# Patient Record
Sex: MALE | Race: WHITE | Employment: OTHER | URBAN - METROPOLITAN AREA
[De-identification: names, ages, dates, MRNs, and addresses within clinical notes are randomized per-mention and may not be internally consistent; named-entity substitution may affect disease eponyms.]

---

## 2017-01-30 ENCOUNTER — TELEPHONE (OUTPATIENT)
Dept: PULMONOLOGY | Age: 72
End: 2017-01-30

## 2017-01-31 ENCOUNTER — TELEPHONE (OUTPATIENT)
Dept: PULMONOLOGY | Age: 72
End: 2017-01-31

## 2017-01-31 RX ORDER — CLARITHROMYCIN 500 MG/1
500 TABLET, COATED ORAL 2 TIMES DAILY
Qty: 60 TABLET | Refills: 10 | Status: SHIPPED | OUTPATIENT
Start: 2017-01-31 | End: 2017-08-18 | Stop reason: SDUPTHER

## 2017-01-31 RX ORDER — ETHAMBUTOL HYDROCHLORIDE 400 MG/1
1200 TABLET, FILM COATED ORAL DAILY
Qty: 90 TABLET | Refills: 10 | Status: SHIPPED | OUTPATIENT
Start: 2017-01-31 | End: 2017-09-12 | Stop reason: SDUPTHER

## 2017-02-07 ENCOUNTER — TELEPHONE (OUTPATIENT)
Dept: PULMONOLOGY | Age: 72
End: 2017-02-07

## 2017-02-28 ENCOUNTER — OFFICE VISIT (OUTPATIENT)
Dept: INFECTIOUS DISEASES | Age: 72
End: 2017-02-28

## 2017-02-28 VITALS
HEART RATE: 78 BPM | BODY MASS INDEX: 22.92 KG/M2 | SYSTOLIC BLOOD PRESSURE: 120 MMHG | OXYGEN SATURATION: 96 % | TEMPERATURE: 49.3 F | WEIGHT: 169 LBS | DIASTOLIC BLOOD PRESSURE: 66 MMHG

## 2017-02-28 DIAGNOSIS — A31.9 ATYPICAL MYCOBACTERIAL DISEASE: Primary | ICD-10-CM

## 2017-02-28 PROCEDURE — 99244 OFF/OP CNSLTJ NEW/EST MOD 40: CPT | Performed by: INTERNAL MEDICINE

## 2017-02-28 RX ORDER — PRAVASTATIN SODIUM 40 MG
1 TABLET ORAL DAILY
Refills: 1 | COMMUNITY
Start: 2017-02-22

## 2017-03-08 ENCOUNTER — TELEPHONE (OUTPATIENT)
Dept: PULMONOLOGY | Age: 72
End: 2017-03-08

## 2017-03-08 ENCOUNTER — OFFICE VISIT (OUTPATIENT)
Dept: PULMONOLOGY | Age: 72
End: 2017-03-08

## 2017-03-08 VITALS
SYSTOLIC BLOOD PRESSURE: 126 MMHG | HEART RATE: 76 BPM | DIASTOLIC BLOOD PRESSURE: 76 MMHG | RESPIRATION RATE: 20 BRPM | OXYGEN SATURATION: 97 % | BODY MASS INDEX: 22.89 KG/M2 | WEIGHT: 169 LBS | TEMPERATURE: 97.4 F | HEIGHT: 72 IN

## 2017-03-08 DIAGNOSIS — R91.8 PULMONARY NODULES: ICD-10-CM

## 2017-03-08 DIAGNOSIS — A31.8: ICD-10-CM

## 2017-03-08 DIAGNOSIS — J98.4 CAVITARY LESION OF LUNG: Primary | ICD-10-CM

## 2017-03-08 PROCEDURE — 99214 OFFICE O/P EST MOD 30 MIN: CPT | Performed by: INTERNAL MEDICINE

## 2017-03-15 ENCOUNTER — HOSPITAL ENCOUNTER (OUTPATIENT)
Dept: CT IMAGING | Age: 72
Discharge: HOME | End: 2017-03-15
Attending: INTERNAL MEDICINE | Admitting: INTERNAL MEDICINE

## 2017-03-15 DIAGNOSIS — A31.8: ICD-10-CM

## 2017-03-15 DIAGNOSIS — J98.4 CAVITARY LESION OF LUNG: ICD-10-CM

## 2017-03-15 DIAGNOSIS — R91.8 PULMONARY NODULES: ICD-10-CM

## 2017-03-15 DIAGNOSIS — J98.4 OTHER DISORDERS OF LUNG: ICD-10-CM

## 2017-03-16 ENCOUNTER — TELEPHONE (OUTPATIENT)
Dept: PULMONOLOGY | Age: 72
End: 2017-03-16

## 2017-03-16 ENCOUNTER — HOSPITAL ENCOUNTER (OUTPATIENT)
Dept: OTHER | Age: 72
Discharge: HOME | End: 2017-03-16
Attending: INTERNAL MEDICINE | Admitting: INTERNAL MEDICINE

## 2017-03-16 DIAGNOSIS — I25.84 CORONARY ARTERY CALCIFICATION: ICD-10-CM

## 2017-03-16 DIAGNOSIS — J98.4 CAVITARY LESION OF LUNG: ICD-10-CM

## 2017-03-16 DIAGNOSIS — J98.4 CAVITARY LUNG DISEASE: Primary | ICD-10-CM

## 2017-03-16 DIAGNOSIS — A31.8: ICD-10-CM

## 2017-03-16 DIAGNOSIS — I25.10 CORONARY ARTERY CALCIFICATION: ICD-10-CM

## 2017-03-16 DIAGNOSIS — R91.8 PULMONARY NODULES: ICD-10-CM

## 2017-03-16 DIAGNOSIS — C34.11 PRIMARY MALIGNANT NEOPLASM OF RIGHT UPPER LOBE OF LUNG (HCC): ICD-10-CM

## 2017-03-18 LAB
HISTOPLASMA ANTIGEN, SERUM: <2 U/ML
HISTOPLASMA INTERPETATION: NEGATIVE

## 2017-03-20 ENCOUNTER — TELEPHONE (OUTPATIENT)
Dept: INFECTIOUS DISEASES | Age: 72
End: 2017-03-20

## 2017-03-20 LAB
Lab: NORMAL
REPORT: NORMAL
THIS TEST SENT TO: NORMAL

## 2017-04-11 ENCOUNTER — TELEPHONE (OUTPATIENT)
Dept: PULMONOLOGY | Age: 72
End: 2017-04-11

## 2017-04-11 ENCOUNTER — OFFICE VISIT (OUTPATIENT)
Dept: PULMONOLOGY | Age: 72
End: 2017-04-11

## 2017-04-11 VITALS
RESPIRATION RATE: 20 BRPM | TEMPERATURE: 97 F | HEART RATE: 70 BPM | DIASTOLIC BLOOD PRESSURE: 73 MMHG | OXYGEN SATURATION: 96 % | SYSTOLIC BLOOD PRESSURE: 133 MMHG | HEIGHT: 72 IN | BODY MASS INDEX: 22.97 KG/M2 | WEIGHT: 169.6 LBS

## 2017-04-11 DIAGNOSIS — R91.1 PULMONARY NODULE: ICD-10-CM

## 2017-04-11 DIAGNOSIS — R05.3 CHRONIC COUGH: ICD-10-CM

## 2017-04-11 DIAGNOSIS — J98.4 CAVITARY LESION OF LUNG: Primary | ICD-10-CM

## 2017-04-11 DIAGNOSIS — A31.8: ICD-10-CM

## 2017-04-11 PROCEDURE — 99214 OFFICE O/P EST MOD 30 MIN: CPT | Performed by: INTERNAL MEDICINE

## 2017-04-12 ENCOUNTER — OFFICE VISIT (OUTPATIENT)
Dept: CARDIOLOGY CLINIC | Age: 72
End: 2017-04-12

## 2017-04-12 VITALS
HEIGHT: 72 IN | SYSTOLIC BLOOD PRESSURE: 132 MMHG | WEIGHT: 169 LBS | HEART RATE: 84 BPM | BODY MASS INDEX: 22.89 KG/M2 | OXYGEN SATURATION: 94 % | DIASTOLIC BLOOD PRESSURE: 66 MMHG

## 2017-04-12 DIAGNOSIS — I25.10 CORONARY ARTERY CALCIFICATION SEEN ON CT SCAN: Primary | ICD-10-CM

## 2017-04-12 DIAGNOSIS — R06.02 SOB (SHORTNESS OF BREATH): ICD-10-CM

## 2017-04-12 PROCEDURE — 93000 ELECTROCARDIOGRAM COMPLETE: CPT | Performed by: INTERNAL MEDICINE

## 2017-04-12 PROCEDURE — 99204 OFFICE O/P NEW MOD 45 MIN: CPT | Performed by: INTERNAL MEDICINE

## 2017-04-17 ENCOUNTER — HOSPITAL ENCOUNTER (OUTPATIENT)
Dept: OTHER | Age: 72
Discharge: HOME | End: 2017-04-30
Attending: INTERNAL MEDICINE | Admitting: INTERNAL MEDICINE

## 2017-04-17 DIAGNOSIS — A31.9 ACID FAST BACILLUS: ICD-10-CM

## 2017-05-01 ENCOUNTER — HOSPITAL ENCOUNTER (OUTPATIENT)
Dept: OTHER | Age: 72
Discharge: HOME | End: 2017-05-31
Attending: INTERNAL MEDICINE | Admitting: INTERNAL MEDICINE

## 2017-05-02 LAB
AFB CULTURE (MYCOBACTERIA): NORMAL
AFB SMEAR: NORMAL

## 2017-06-06 LAB
AFB CULTURE (MYCOBACTERIA): NORMAL
AFB SMEAR: NORMAL

## 2017-06-15 ENCOUNTER — TELEPHONE (OUTPATIENT)
Dept: PULMONOLOGY | Age: 72
End: 2017-06-15

## 2017-06-27 LAB
AFB CULTURE (MYCOBACTERIA): NORMAL
AFB SMEAR: NORMAL

## 2017-08-15 ENCOUNTER — HOSPITAL ENCOUNTER (OUTPATIENT)
Dept: OTHER | Age: 72
Discharge: HOME | End: 2017-08-15
Attending: INTERNAL MEDICINE | Admitting: INTERNAL MEDICINE

## 2017-08-15 DIAGNOSIS — J98.4 CAVITARY LUNG DISEASE: ICD-10-CM

## 2017-08-15 DIAGNOSIS — A31.9 ACID FAST BACILLUS: ICD-10-CM

## 2017-08-18 RX ORDER — CLARITHROMYCIN 500 MG/1
TABLET, COATED ORAL
Qty: 60 TABLET | Refills: 5 | Status: SHIPPED | OUTPATIENT
Start: 2017-08-18 | End: 2017-09-12 | Stop reason: SDUPTHER

## 2017-09-06 ENCOUNTER — TELEPHONE (OUTPATIENT)
Dept: PULMONOLOGY | Age: 72
End: 2017-09-06

## 2017-09-06 DIAGNOSIS — R91.8 PULMONARY NODULES: ICD-10-CM

## 2017-09-06 DIAGNOSIS — J98.4 CAVITARY LESION OF LUNG: Primary | ICD-10-CM

## 2017-09-06 DIAGNOSIS — A31.8: ICD-10-CM

## 2017-09-12 ENCOUNTER — OFFICE VISIT (OUTPATIENT)
Dept: PULMONOLOGY | Age: 72
End: 2017-09-12

## 2017-09-12 ENCOUNTER — TELEPHONE (OUTPATIENT)
Dept: PULMONOLOGY | Age: 72
End: 2017-09-12

## 2017-09-12 ENCOUNTER — HOSPITAL ENCOUNTER (OUTPATIENT)
Dept: CT IMAGING | Age: 72
Discharge: HOME | End: 2017-09-12
Attending: INTERNAL MEDICINE | Admitting: INTERNAL MEDICINE

## 2017-09-12 VITALS
SYSTOLIC BLOOD PRESSURE: 124 MMHG | RESPIRATION RATE: 16 BRPM | WEIGHT: 171 LBS | OXYGEN SATURATION: 98 % | TEMPERATURE: 97.1 F | HEART RATE: 65 BPM | BODY MASS INDEX: 23.16 KG/M2 | HEIGHT: 72 IN | DIASTOLIC BLOOD PRESSURE: 66 MMHG

## 2017-09-12 DIAGNOSIS — J98.4 CAVITARY LESION OF LUNG: ICD-10-CM

## 2017-09-12 DIAGNOSIS — A31.8: ICD-10-CM

## 2017-09-12 DIAGNOSIS — J98.4 OTHER DISORDERS OF LUNG: ICD-10-CM

## 2017-09-12 DIAGNOSIS — J98.4 CAVITARY LESION OF LUNG: Primary | ICD-10-CM

## 2017-09-12 DIAGNOSIS — R91.8 PULMONARY NODULES: ICD-10-CM

## 2017-09-12 DIAGNOSIS — R05.3 CHRONIC COUGH: ICD-10-CM

## 2017-09-12 PROCEDURE — 99214 OFFICE O/P EST MOD 30 MIN: CPT | Performed by: INTERNAL MEDICINE

## 2017-09-12 RX ORDER — ETHAMBUTOL HYDROCHLORIDE 400 MG/1
1200 TABLET, FILM COATED ORAL DAILY
Qty: 90 TABLET | Refills: 4 | Status: SHIPPED | OUTPATIENT
Start: 2017-09-12 | End: 2018-02-20 | Stop reason: SDUPTHER

## 2017-09-12 RX ORDER — CLARITHROMYCIN 500 MG/1
TABLET, COATED ORAL
Qty: 60 TABLET | Refills: 4 | Status: SHIPPED | OUTPATIENT
Start: 2017-09-12 | End: 2018-02-19 | Stop reason: SDUPTHER

## 2017-09-15 ENCOUNTER — HOSPITAL ENCOUNTER (OUTPATIENT)
Dept: OTHER | Age: 72
Discharge: HOME | End: 2017-09-30
Attending: INTERNAL MEDICINE | Admitting: INTERNAL MEDICINE

## 2017-09-15 DIAGNOSIS — A31.9 ACID FAST BACILLUS: ICD-10-CM

## 2017-10-03 LAB
AFB CULTURE (MYCOBACTERIA): NORMAL
AFB SMEAR: NORMAL

## 2017-10-31 LAB
AFB CULTURE (MYCOBACTERIA): NORMAL
AFB SMEAR: NORMAL

## 2017-11-01 ENCOUNTER — HOSPITAL ENCOUNTER (OUTPATIENT)
Dept: OTHER | Age: 72
Discharge: HOME | End: 2017-11-30
Attending: INTERNAL MEDICINE | Admitting: INTERNAL MEDICINE

## 2017-11-06 ENCOUNTER — TELEPHONE (OUTPATIENT)
Dept: PULMONOLOGY | Age: 72
End: 2017-11-06

## 2017-11-06 DIAGNOSIS — A31.9 MYCOBACTERIAL DISEASE: Primary | ICD-10-CM

## 2017-11-06 NOTE — TELEPHONE ENCOUNTER
Needs order placed for AFB to take with him to [place]. Order pended. Call when complete. 9/12/17 JOSSELIN Holman  Assessment:       · RUL cavitary lesion with adjacent pulmonary nodules. 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 Ethambutol/Clarthromycin and Rifampin started 11/25/2016 with clinical and radiographic response   · New 1.4 cm RUL nodule on CT chest 3/15/2017. Resolved on repeat CT 9/12/2017. 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 · Coronary artery calcifications- evaluated by cardiology          Plan:       · Follow up on final AFB Cx from [place]   · Continue Ethambutol, Rifampin and Clarithromycin. Risks, benefits and side effects were discussed with patient including hepatitis and optic neuronitis. Advised to discontinue promptly with any changes in vision, color blindness, or visual defects.  Repeat visual screening monthly  · Monthly AFB  · Albuterol 2 puffs Q4-6 hrs PRN  · Smoking cessation counseling   · CT chest in 6 months

## 2017-12-01 ENCOUNTER — HOSPITAL ENCOUNTER (OUTPATIENT)
Dept: OTHER | Age: 72
Discharge: HOME | End: 2017-12-31
Attending: INTERNAL MEDICINE | Admitting: INTERNAL MEDICINE

## 2018-01-01 ENCOUNTER — HOSPITAL ENCOUNTER (OUTPATIENT)
Dept: OTHER | Age: 73
Discharge: HOME | End: 2018-01-31
Attending: INTERNAL MEDICINE | Admitting: INTERNAL MEDICINE

## 2018-01-02 LAB
AFB CULTURE (MYCOBACTERIA): NORMAL
AFB SMEAR: NORMAL

## 2018-01-05 ENCOUNTER — TELEPHONE (OUTPATIENT)
Dept: PULMONOLOGY | Age: 73
End: 2018-01-05

## 2018-01-12 ENCOUNTER — TELEPHONE (OUTPATIENT)
Dept: PULMONOLOGY | Age: 73
End: 2018-01-12

## 2018-02-20 RX ORDER — ETHAMBUTOL HYDROCHLORIDE 400 MG/1
1200 TABLET, FILM COATED ORAL DAILY
Qty: 90 TABLET | Refills: 4 | Status: SHIPPED | OUTPATIENT
Start: 2018-02-20 | End: 2018-08-03 | Stop reason: SDUPTHER

## 2018-02-20 RX ORDER — CLARITHROMYCIN 500 MG/1
TABLET, COATED ORAL
Qty: 60 TABLET | Refills: 4 | Status: SHIPPED | OUTPATIENT
Start: 2018-02-20 | End: 2018-07-20 | Stop reason: SDUPTHER

## 2018-02-26 ENCOUNTER — OFFICE VISIT (OUTPATIENT)
Dept: INFECTIOUS DISEASES | Age: 73
End: 2018-02-26

## 2018-02-26 VITALS
DIASTOLIC BLOOD PRESSURE: 60 MMHG | WEIGHT: 173 LBS | SYSTOLIC BLOOD PRESSURE: 118 MMHG | TEMPERATURE: 97.8 F | BODY MASS INDEX: 23.46 KG/M2

## 2018-02-26 DIAGNOSIS — A31.0 ATYPICAL MYCOBACTERIAL INFECTION OF LUNG (HCC): Primary | ICD-10-CM

## 2018-02-26 PROCEDURE — 99213 OFFICE O/P EST LOW 20 MIN: CPT | Performed by: INTERNAL MEDICINE

## 2018-02-26 NOTE — LETTER
Dear Presley Johnson:    I had the pleasure of seeing Namrata Núñez in the office on 2/16/18 for his pulmonary atypical mycobacterial infection. CC:    Subjective:     Patient patient recently returned from [place] where he was playing golf four times a week. He feels fine. He denies fevers, chills, sweats or weight loss. He denies side effects related to his antibiotics which consist of Clarithromycin 500 mg bid, rifampin 600 mg daily and ethambutol 1200 mg daily. He denies nausea, abdominal pain or diarrhea. He has an early morning productive cough. He has mild dyspnea which is chronic and which has been present since he had surgery for lung cancer. .      Objective:     Vitals:    02/26/18 1434   BP: 118/60   Temp: 97.8 °F (36.6 °C)       EXAM:  General: alert appropriate afebrile   Nodes: no cervical, SC or Ax adenopathy. LUNGS: Resp unlabored; clear to auscultation     CV: RR s M     ABD: soft, non-tender, without mass     EXT: without edema   Skin: no rash      Data Review:    with regards to weight,   Wt Readings from Last 50 Encounters:   02/26/18 173 lb (78.5 kg)   09/12/17 171 lb (77.6 kg)   05/08/17 170 lb (77.1 kg)   04/12/17 169 lb (76.7 kg)   04/11/17 169 lb 9.6 oz (76.9 kg)   03/28/17 171 lb (77.6 kg)   03/08/17 169 lb (76.7 kg)   02/28/17 169 lb (76.7 kg)   10/13/16 172 lb (78 kg)   09/28/16 171 lb 9.6 oz (77.8 kg)   09/21/16 165 lb (74.8 kg)   09/19/16 172 lb 6.4 oz (78.2 kg)   08/24/16 172 lb 12.8 oz (78.4 kg)   08/18/16 162 lb 8 oz (73.7 kg)   08/05/16 170 lb (77.1 kg)   05/17/16 169 lb 12.8 oz (77 kg)   05/03/16 171 lb (77.6 kg)   04/13/16 169 lb 3.2 oz (76.7 kg)   10/29/12 162 lb (73.5 kg)           MICRO: The patient has had 10 negative AFB sputum cultures performed by the [facility] lab in the last year. He had one AFB sputum culture positive for M xenopi in July of last year and one AFB culture positive for M avium in January of this year, which the patient says were performed in [place].

## 2018-03-13 ENCOUNTER — HOSPITAL ENCOUNTER (OUTPATIENT)
Dept: CT IMAGING | Age: 73
Discharge: HOME | End: 2018-03-13
Attending: INTERNAL MEDICINE | Admitting: INTERNAL MEDICINE

## 2018-03-13 ENCOUNTER — OFFICE VISIT (OUTPATIENT)
Dept: PULMONOLOGY | Age: 73
End: 2018-03-13

## 2018-03-13 VITALS
TEMPERATURE: 97.7 F | BODY MASS INDEX: 23.03 KG/M2 | RESPIRATION RATE: 16 BRPM | WEIGHT: 170 LBS | OXYGEN SATURATION: 96 % | SYSTOLIC BLOOD PRESSURE: 136 MMHG | DIASTOLIC BLOOD PRESSURE: 80 MMHG | HEIGHT: 72 IN | HEART RATE: 76 BPM

## 2018-03-13 DIAGNOSIS — J98.4 DISEASE OF LUNG: ICD-10-CM

## 2018-03-13 DIAGNOSIS — J98.4 CAVITARY LUNG DISEASE: ICD-10-CM

## 2018-03-13 DIAGNOSIS — J98.4 OTHER DISORDERS OF LUNG: ICD-10-CM

## 2018-03-13 DIAGNOSIS — A31.8 INFECTION DUE TO MYCOBACTERIUM XENOPI: Primary | ICD-10-CM

## 2018-03-13 DIAGNOSIS — J98.4 OTHER DISORDERS OF LUNG (CODE): ICD-10-CM

## 2018-03-13 DIAGNOSIS — A31.8 ATYPICAL MYCOBACTERIAL LYMPHADENITIS: ICD-10-CM

## 2018-03-13 PROCEDURE — 99214 OFFICE O/P EST MOD 30 MIN: CPT | Performed by: INTERNAL MEDICINE

## 2018-03-13 NOTE — PROGRESS NOTES
.Up to date with Influenza vaccine.
lobe large cell carcinoma post resection 4/12/1999  · Coronary artery calcifications- evaluated by cardiology          Plan:      · Follow up on final AFB Cx from 2/2018  · Monthly AFB   · Continue current therapy Ethambutol, Rifampin and Clarithromycin given clinical and radiographic improvement. Follow up on monthly AFBs. Risks, benefits and side effects were discussed with patient including hepatitis and optic neuronitis. Advised to discontinue promptly with any changes in vision, color blindness, or visual defects.    · Visual screening monthly  · Albuterol 2 puffs Q4-6 hrs PRN  · Smoking cessation counseling

## 2018-03-16 ENCOUNTER — HOSPITAL ENCOUNTER (OUTPATIENT)
Dept: OTHER | Age: 73
Discharge: HOME | End: 2018-03-16
Attending: INTERNAL MEDICINE | Admitting: INTERNAL MEDICINE

## 2018-03-16 DIAGNOSIS — A31.9 MYCOBACTERIAL DISEASE: ICD-10-CM

## 2018-03-22 ENCOUNTER — TELEPHONE (OUTPATIENT)
Dept: PULMONOLOGY | Age: 73
End: 2018-03-22

## 2018-04-16 ENCOUNTER — HOSPITAL ENCOUNTER (OUTPATIENT)
Dept: OTHER | Age: 73
Discharge: HOME | End: 2018-04-30
Attending: INTERNAL MEDICINE | Admitting: INTERNAL MEDICINE

## 2018-05-01 ENCOUNTER — HOSPITAL ENCOUNTER (OUTPATIENT)
Dept: OTHER | Age: 73
Discharge: HOME | End: 2018-05-31
Attending: INTERNAL MEDICINE | Admitting: INTERNAL MEDICINE

## 2018-05-01 DIAGNOSIS — A31.8 INFECTION DUE TO MYCOBACTERIUM XENOPI: ICD-10-CM

## 2018-05-01 DIAGNOSIS — J98.4 CAVITARY LUNG DISEASE: ICD-10-CM

## 2018-05-01 LAB
AFB CULTURE (MYCOBACTERIA): NORMAL
AFB SMEAR: NORMAL

## 2018-05-03 ENCOUNTER — TELEPHONE (OUTPATIENT)
Dept: PULMONOLOGY | Age: 73
End: 2018-05-03

## 2018-05-03 DIAGNOSIS — A31.8 INFECTION DUE TO MYCOBACTERIUM XENOPI: Primary | ICD-10-CM

## 2018-05-03 DIAGNOSIS — J98.4 CAVITARY LUNG DISEASE: ICD-10-CM

## 2018-06-01 ENCOUNTER — HOSPITAL ENCOUNTER (OUTPATIENT)
Dept: OTHER | Age: 73
Discharge: HOME | End: 2018-06-30
Attending: INTERNAL MEDICINE | Admitting: INTERNAL MEDICINE

## 2018-06-05 LAB
AFB CULTURE (MYCOBACTERIA): NORMAL
AFB SMEAR: NORMAL

## 2018-06-16 ENCOUNTER — TELEPHONE (OUTPATIENT)
Dept: PULMONOLOGY | Age: 73
End: 2018-06-16

## 2018-07-01 ENCOUNTER — HOSPITAL ENCOUNTER (OUTPATIENT)
Dept: OTHER | Age: 73
Discharge: HOME | End: 2018-07-01
Attending: INTERNAL MEDICINE | Admitting: INTERNAL MEDICINE

## 2018-07-03 LAB
AFB CULTURE (MYCOBACTERIA): NORMAL
AFB SMEAR: NORMAL

## 2018-07-23 RX ORDER — CLARITHROMYCIN 500 MG/1
TABLET, COATED ORAL
Qty: 60 TABLET | Refills: 5 | Status: SHIPPED | OUTPATIENT
Start: 2018-07-23 | End: 2019-01-13 | Stop reason: SDUPTHER

## 2018-08-03 RX ORDER — ETHAMBUTOL HYDROCHLORIDE 400 MG/1
1200 TABLET, FILM COATED ORAL DAILY
Qty: 90 TABLET | Refills: 1 | OUTPATIENT
Start: 2018-08-03

## 2018-08-03 NOTE — TELEPHONE ENCOUNTER
From: Fabian Rojas  Sent: 8/2/2018 9:00 PM EDT  Subject: Medication Renewal Request    Summer Horowitzs. Evangelina Hutson would like a refill of the following medications:     ethambutol (MYAMBUTOL) 400 MG tablet Russell Bedolla MD]    Preferred pharmacy: [facility]Y #[id number]- [address] [phone]

## 2018-08-05 RX ORDER — ETHAMBUTOL HYDROCHLORIDE 400 MG/1
1200 TABLET, FILM COATED ORAL DAILY
Qty: 90 TABLET | Refills: 5 | Status: SHIPPED | OUTPATIENT
Start: 2018-08-05 | End: 2019-03-04 | Stop reason: SDUPTHER

## 2018-08-29 ENCOUNTER — TELEPHONE (OUTPATIENT)
Dept: PULMONOLOGY | Age: 73
End: 2018-08-29

## 2018-08-29 ENCOUNTER — OFFICE VISIT (OUTPATIENT)
Dept: PULMONOLOGY | Age: 73
End: 2018-08-29

## 2018-08-29 VITALS
BODY MASS INDEX: 22.73 KG/M2 | DIASTOLIC BLOOD PRESSURE: 78 MMHG | SYSTOLIC BLOOD PRESSURE: 126 MMHG | TEMPERATURE: 97.8 F | OXYGEN SATURATION: 98 % | HEIGHT: 72 IN | HEART RATE: 78 BPM | RESPIRATION RATE: 18 BRPM | WEIGHT: 167.8 LBS

## 2018-08-29 DIAGNOSIS — A31.0 ATYPICAL MYCOBACTERIAL INFECTION OF LUNG (HCC): ICD-10-CM

## 2018-08-29 DIAGNOSIS — J98.4 CAVITARY LESION OF LUNG: Primary | ICD-10-CM

## 2018-08-29 DIAGNOSIS — R91.8 PULMONARY NODULES: ICD-10-CM

## 2018-08-29 DIAGNOSIS — Z87.891 PERSONAL HISTORY OF SMOKING: ICD-10-CM

## 2018-08-29 DIAGNOSIS — R05.3 CHRONIC COUGH: ICD-10-CM

## 2018-08-29 PROCEDURE — 99214 OFFICE O/P EST MOD 30 MIN: CPT | Performed by: INTERNAL MEDICINE

## 2018-08-29 NOTE — PROGRESS NOTES
jirovecii organisms. Bronchial Lavage: No malignant cells identified Benign bronchial cells, pulmonary macrophages and mixed leukocytes (lymphocytes, neutrophils) are present. GMS stain is negative for fungal and pneumocystis jirovecii  Biopsies from right lower lobe:Negative for malignancy. Negative for granulomatous inflammation. Congestion with hemosiderin-laden macrophages. Negative AFB and GMS stain     Histoplasma Ab Mycelial CF <1:8 <1:8     Histoplasma Ab Yeast CF <1:8 1:8 (H)      Histoplasma Ag Interp Not Detected Not Detected           Bronchwash 9/21/2016 Mycobacterium xenopi  BronchBAL   09/21/2016 Negative  Sputum AFB 09/23/2016 Mycobacterium xenopi  Sputum AFB 09/24/2016 Negative    Sputum AFB 09/25/2016 Negative   Sputum AFB 01/18/2017 Positive    Sputum AFB 03/16/2017 Negative   Sputum AFB 04/17/2017 Negative   Sputum AFB 05/13/2017 Negative   Sputum AFB 06/13/2017 Positive  Sputum AFB 07/18/2017 Negative   Sputum AFB 08/15/2017 Negative   Sputum AFB 09/15/2017 Negative   Sputum AFB 11/06/2017 Negative   Sputum AFB 12/19/2017 Positive Mycobacterium Avium   Sputum AFB 02/14/2018 Negative  Sputum AFB 03/16/2018 Negative   Sputum AFB 05/15/2018 Negative   Sputum AFB 06/19/2018 Negative   Sputum AFB 07/16/2018 Negative       Assessment:       · RUL cavitary lesion with adjacent pulmonary nodules. 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 Ethambutol/Clarthromycin and Rifampin started 11/25/2016 with clinical and radiographic response. Sputum AFB 12/19/2017 Positive Mycobacterium Avium    · New 1.4 cm RUL nodule on CT chest 3/15/2017. Resolved on repeat CT 9/12/2017. Favor mycobacterial xenopi infection     · Chronic cough - secondary to above.  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

## 2018-09-14 ENCOUNTER — HOSPITAL ENCOUNTER (OUTPATIENT)
Age: 73
Discharge: HOME | End: 2018-09-14
Payer: MEDICARE

## 2018-09-14 DIAGNOSIS — A31.8 INFECTION DUE TO MYCOBACTERIUM XENOPI: ICD-10-CM

## 2018-09-14 DIAGNOSIS — J98.4 CAVITARY LUNG DISEASE: ICD-10-CM

## 2018-09-14 PROCEDURE — 87116 MYCOBACTERIA CULTURE: CPT

## 2018-09-14 PROCEDURE — 87206 SMEAR FLUORESCENT/ACID STAI: CPT

## 2018-09-14 PROCEDURE — 87015 SPECIMEN INFECT AGNT CONCNTJ: CPT

## 2018-10-15 ENCOUNTER — HOSPITAL ENCOUNTER (OUTPATIENT)
Age: 73
Discharge: HOME | End: 2018-10-15
Payer: MEDICARE

## 2018-10-15 ENCOUNTER — TELEPHONE (OUTPATIENT)
Dept: PULMONOLOGY | Age: 73
End: 2018-10-15

## 2018-10-15 DIAGNOSIS — J98.4 CAVITARY LESION OF LUNG: Primary | ICD-10-CM

## 2018-10-15 DIAGNOSIS — A31.0 ATYPICAL MYCOBACTERIAL INFECTION OF LUNG (HCC): ICD-10-CM

## 2018-10-15 PROCEDURE — 87015 SPECIMEN INFECT AGNT CONCNTJ: CPT

## 2018-10-15 PROCEDURE — 87116 MYCOBACTERIA CULTURE: CPT

## 2018-10-15 PROCEDURE — 87206 SMEAR FLUORESCENT/ACID STAI: CPT

## 2018-10-15 NOTE — TELEPHONE ENCOUNTER
Non-Urgent Medical Question     From  Katty Gleason To  [facility] Clinical Staff Sent  10/13/2018  8:22 AM   I will be going to [place] for three months on the 15th of November. Therefore I need an order, or three orders for the sputum smear and culture done by [facility] while Im in [facility], for December January and February. I can pick orders up at [facility] if you provide them.    Thanks,   Lora Rojo

## 2018-10-30 LAB
AFB CULTURE (MYCOBACTERIA): NORMAL
AFB SMEAR: NORMAL

## 2018-11-07 ENCOUNTER — TELEPHONE (OUTPATIENT)
Dept: PULMONOLOGY | Age: 73
End: 2018-11-07

## 2018-11-07 NOTE — TELEPHONE ENCOUNTER
Message     Please consult with pharmacy for appropriate dosing   ----- Message -----   From: Jeison Scherer MA   Sent: 11/7/2018   9:16 AM   To: Dina Frazier MD   Subject: Scan                                               The image below was scanned by Jeison Scherer MA [[id number]] on 11/7/2018 at 9:16 AM to the following: Leena Ortega [[id number]]:      [address] Notice:        Scan on 11/7/2018  9:16 AM by Jeison Scherer MA : Important patient and safety and health consideration review 11/7/18          Description      Important patient and safety and health consideration review 11/7/18

## 2018-11-14 ENCOUNTER — HOSPITAL ENCOUNTER (OUTPATIENT)
Age: 73
Discharge: HOME | End: 2018-11-14
Payer: MEDICARE

## 2018-11-14 DIAGNOSIS — J98.4 CAVITARY LESION OF LUNG: ICD-10-CM

## 2018-11-14 DIAGNOSIS — A31.0 ATYPICAL MYCOBACTERIAL INFECTION OF LUNG (HCC): ICD-10-CM

## 2018-11-14 DIAGNOSIS — R91.8 PULMONARY NODULES: ICD-10-CM

## 2018-11-14 PROCEDURE — 87015 SPECIMEN INFECT AGNT CONCNTJ: CPT

## 2018-11-14 PROCEDURE — 87116 MYCOBACTERIA CULTURE: CPT

## 2018-11-14 PROCEDURE — 87206 SMEAR FLUORESCENT/ACID STAI: CPT

## 2018-11-27 LAB
AFB CULTURE (MYCOBACTERIA): NORMAL
AFB SMEAR: NORMAL

## 2019-01-01 LAB
AFB CULTURE (MYCOBACTERIA): NORMAL
AFB SMEAR: NORMAL

## 2019-01-14 RX ORDER — CLARITHROMYCIN 500 MG/1
TABLET, COATED ORAL
Qty: 60 TABLET | Refills: 1 | Status: SHIPPED | OUTPATIENT
Start: 2019-01-14 | End: 2019-03-14 | Stop reason: ALTCHOICE

## 2019-03-04 RX ORDER — ETHAMBUTOL HYDROCHLORIDE 400 MG/1
TABLET, FILM COATED ORAL
Qty: 90 TABLET | Refills: 5 | Status: SHIPPED | OUTPATIENT
Start: 2019-03-04 | End: 2019-03-14 | Stop reason: ALTCHOICE

## 2019-03-13 ENCOUNTER — HOSPITAL ENCOUNTER (OUTPATIENT)
Dept: CT IMAGING | Age: 74
Discharge: HOME | End: 2019-03-13
Payer: MEDICARE

## 2019-03-13 DIAGNOSIS — A31.0 ATYPICAL MYCOBACTERIAL INFECTION OF LUNG (HCC): ICD-10-CM

## 2019-03-13 DIAGNOSIS — J98.4 CAVITARY LESION OF LUNG: ICD-10-CM

## 2019-03-13 DIAGNOSIS — R91.8 PULMONARY NODULES: ICD-10-CM

## 2019-03-13 PROCEDURE — 71250 CT THORAX DX C-: CPT

## 2019-03-14 ENCOUNTER — OFFICE VISIT (OUTPATIENT)
Dept: PULMONOLOGY | Age: 74
End: 2019-03-14
Payer: MEDICARE

## 2019-03-14 ENCOUNTER — TELEPHONE (OUTPATIENT)
Dept: PULMONOLOGY | Age: 74
End: 2019-03-14

## 2019-03-14 VITALS
DIASTOLIC BLOOD PRESSURE: 62 MMHG | TEMPERATURE: 98.1 F | RESPIRATION RATE: 16 BRPM | OXYGEN SATURATION: 97 % | SYSTOLIC BLOOD PRESSURE: 122 MMHG | HEIGHT: 72 IN | BODY MASS INDEX: 22.48 KG/M2 | HEART RATE: 82 BPM | WEIGHT: 166 LBS

## 2019-03-14 DIAGNOSIS — Z87.891 PERSONAL HISTORY OF SMOKING: ICD-10-CM

## 2019-03-14 DIAGNOSIS — A31.0 ATYPICAL MYCOBACTERIAL INFECTION OF LUNG (HCC): ICD-10-CM

## 2019-03-14 DIAGNOSIS — R91.8 PULMONARY NODULES: ICD-10-CM

## 2019-03-14 DIAGNOSIS — J98.4 CAVITARY LESION OF LUNG: Primary | ICD-10-CM

## 2019-03-14 PROCEDURE — 99214 OFFICE O/P EST MOD 30 MIN: CPT | Performed by: INTERNAL MEDICINE

## 2019-03-14 RX ORDER — ALBUTEROL SULFATE 90 UG/1
2 AEROSOL, METERED RESPIRATORY (INHALATION) EVERY 6 HOURS PRN
Qty: 1 INHALER | Refills: 6 | Status: SHIPPED | OUTPATIENT
Start: 2019-03-14 | End: 2020-03-19 | Stop reason: SDUPTHER

## 2020-02-19 ENCOUNTER — TELEPHONE (OUTPATIENT)
Dept: PULMONOLOGY | Age: 75
End: 2020-02-19

## 2020-03-13 ENCOUNTER — HOSPITAL ENCOUNTER (OUTPATIENT)
Age: 75
Discharge: HOME | End: 2020-03-13
Payer: MEDICARE

## 2020-03-13 ENCOUNTER — HOSPITAL ENCOUNTER (OUTPATIENT)
Dept: GENERAL RADIOLOGY | Age: 75
Discharge: HOME | End: 2020-03-13
Payer: MEDICARE

## 2020-03-13 PROCEDURE — 71046 X-RAY EXAM CHEST 2 VIEWS: CPT

## 2020-03-19 ENCOUNTER — OFFICE VISIT (OUTPATIENT)
Dept: PULMONOLOGY | Age: 75
End: 2020-03-19
Payer: MEDICARE

## 2020-03-19 VITALS
OXYGEN SATURATION: 96 % | BODY MASS INDEX: 22.75 KG/M2 | RESPIRATION RATE: 20 BRPM | SYSTOLIC BLOOD PRESSURE: 132 MMHG | WEIGHT: 168 LBS | HEART RATE: 72 BPM | HEIGHT: 72 IN | TEMPERATURE: 97.8 F | DIASTOLIC BLOOD PRESSURE: 84 MMHG

## 2020-03-19 PROCEDURE — 99214 OFFICE O/P EST MOD 30 MIN: CPT | Performed by: INTERNAL MEDICINE

## 2020-03-19 RX ORDER — ALBUTEROL SULFATE 90 UG/1
2 AEROSOL, METERED RESPIRATORY (INHALATION) EVERY 6 HOURS PRN
Qty: 1 INHALER | Refills: 6 | Status: SHIPPED | OUTPATIENT
Start: 2020-03-19 | End: 2021-04-26 | Stop reason: SDUPTHER

## 2020-03-19 NOTE — PROGRESS NOTES
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

## 2020-04-20 ENCOUNTER — TELEPHONE (OUTPATIENT)
Dept: PULMONOLOGY | Age: 75
End: 2020-04-20

## 2020-04-23 ENCOUNTER — HOSPITAL ENCOUNTER (OUTPATIENT)
Dept: CT IMAGING | Age: 75
Discharge: HOME | End: 2020-04-23
Payer: MEDICARE

## 2020-04-23 PROCEDURE — 71250 CT THORAX DX C-: CPT

## 2020-04-28 ENCOUNTER — TELEPHONE (OUTPATIENT)
Dept: PULMONOLOGY | Age: 75
End: 2020-04-28

## 2020-04-28 ENCOUNTER — VIRTUAL VISIT (OUTPATIENT)
Dept: PULMONOLOGY | Age: 75
End: 2020-04-28
Payer: MEDICARE

## 2020-04-28 VITALS — WEIGHT: 165 LBS | HEIGHT: 72 IN | BODY MASS INDEX: 22.35 KG/M2

## 2020-04-28 PROCEDURE — 99214 OFFICE O/P EST MOD 30 MIN: CPT | Performed by: INTERNAL MEDICINE

## 2020-04-28 NOTE — PROGRESS NOTES
daily.  , Disp: , Rfl:     hydrochlorothiazide (HYDRODIURIL) 25 MG tablet, Take 25 mg by mouth daily Takes 0.5 tab daily, Disp: , Rfl:     aspirin 325 MG tablet, Take 325 mg by mouth daily. , Disp: , Rfl:       Objective:   PHYSICAL EXAM:    Height 6' (1.829 m), weight 165 lb (74.8 kg). ' on RA  O2 Sat:  HR:  BP:  RR:  Temperature:  Constitutional:  No acute distress. Appears well developed and nourished. Eyes: No sclera icterus. EOM intact. No visible discharge. HENT: Head is normocephalic and atraumatic. Mucus membranes are moist and the tongue appears normal. Normal appearing nose. External Ears normal.   Neck: No visualized mass. Dimple Setter is midline   Resp: No accessory muscle use. Respiratory effort normal. No visualized signs of difficulty breathing or respiratory distress. Cardiovascular: No LE edema. Musculoskeletal: Normal gait with no signs of ataxia. Normal range of motion of the neck. Skin: No significant exanthematous lesions or discoloration noted on facial skin    Neuro: Awake. Alert. Able to follow commands. No facial asymmetry. No gaze palsy. Psych: No agitation. Normal affect. No hallucinations. Oriented to person/time/place. No anxiety. Normal judgement and insight. DATA reviewed by me:       TIBURCIO TTNBx 8/18/2016: Tissue cores with necrosis, fibrosis, and chronic inflammation. No viable carcinoma is identified in the submitted material    Quantiferon(r) TB Gold (Incubated) Negative Negative     Bronch 9/21/2016:  RLL Brushing:No malignant cells identified. Benign bronchial cells, pulmonary macrophages, mixed leukocytes (lymphocytes, neutrophils) are present  Washing:No malignant cells identified. Benign bronchial cells, pulmonary macrophages and mixed leukocytes  (lymphocytes, neutrophils) are present. GMS stain is negative for fungal and pneumocystis jirovecii organisms.   Bronchial Lavage: No malignant cells identified Benign bronchial cells, pulmonary macrophages and mixed leukocytes (lymphocytes, neutrophils) are present. GMS stain is negative for fungal and pneumocystis jirovecii  Biopsies from right lower lobe:Negative for malignancy. Negative for granulomatous inflammation. Congestion with hemosiderin-laden macrophages. Negative AFB and GMS stain     Histoplasma Ab Mycelial CF <1:8 <1:8     Histoplasma Ab Yeast CF <1:8 1:8 (H)      Histoplasma Ag Interp Not Detected Not Detected           Bronchwash 9/21/2016 Mycobacterium xenopi  BronchBAL   09/21/2016 Negative  Sputum AFB 09/23/2016 Mycobacterium xenopi  Sputum AFB 09/24/2016 Negative    Sputum AFB 09/25/2016 Negative   Sputum AFB 01/18/2017 Positive    Sputum AFB 03/16/2017 Negative   Sputum AFB 04/17/2017 Negative   Sputum AFB 05/13/2017 Negative   Sputum AFB 06/13/2017 Positive  Sputum AFB 07/18/2017 Negative   Sputum AFB 08/15/2017 Negative   Sputum AFB 09/15/2017 Negative   Sputum AFB 11/06/2017 Negative   Sputum AFB 12/19/2017 Positive Mycobacterium Avium   Sputum AFB 02/14/2018 Negative  Sputum AFB 03/16/2018 Negative   Sputum AFB 05/15/2018 Negative   Sputum AFB 06/19/2018 Negative   Sputum AFB 07/16/2018 Negative  Sputum AFB 08/15/2018 Negative  Sputum AFB 09/14/2018 Negative  Sputum AFB 10/15/2018 Negative  Sputum AFB 11/14/2018 Negative  Sputum AFB 12/17/2018 Negative  Sputum AFB 01/15/2019 Negative  Sputum AFB 02/14/2019 Negative    CT chest 3/13/2018  images reviewed by me and showed:   Persistent but decreased size of cavitary focus right upper lobe. No change in curvilinear nodular opacity in the left upper lobe.    Underlying emphysema with post lobectomy changes     CT chest 3/13/19 imaging reviewed by me and showed  Stable 4 mm right lower lobe nodule    Chest x-ray 3/13/2020 imaging reviewed by me and showed  Pulmonary emphysema  No acute consultation  Right basilar atelectasis/scarring    CT chest 4/23/2020 imaging reviewed by me and showed  Advanced emphysema with parenchymal scarring at the left apex  Stable noncalcified 4 mm right lower lobe nodule    Assessment:       · Pulmonary bronchiectasis   · RLL 4 mm nodule stable on repeat CT chest 4/23/2020. Favor granuloma    · RUL cavitary lesion with adjacent pulmonary nodules. 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 Ethambutol/Clarthromycin and Rifampin started 11/25/2016 with clinical and radiographic response. 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 AFB negative since 2/14/18. Off Mycobacterium medications since 3/14/2019     · New 1.4 cm RUL nodule on CT chest 3/15/2017. Resolved on repeat CT 9/12/2017. 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 Risks, benefits and alternatives including doing nothing were discussed with patient. · Advised to report any developing respiratory symptoms   · Continue Albuterol 2 puffs Q4-6 hrs PRN  · Advised to continue with smoking cessation  · Follow-up after CT or sooner if needed        . Ashok Clarke is a 69 y.o. male being evaluated by a Virtual Visit (video visit) encounter to address concerns as mentioned above. A caregiver was present when appropriate. Due to this being a TeleHealth encounter ([facility]), evaluation of the following organ systems was limited: Vitals/Constitutional/EENT/Resp/CV/GI//MS/Neuro/Skin/Heme-Lymph-Imm. Pursuant to the emergency declaration under the [address] [place] [address], [address] [place] [address] [facility] authority and the [employer] Act, this Virtual Visit was conducted with patient's (and/or legal guardian's) consent, to reduce the patient's risk of exposure to COVID-19 and provide necessary medical care.   The patient

## 2021-04-23 ENCOUNTER — HOSPITAL ENCOUNTER (OUTPATIENT)
Dept: CT IMAGING | Age: 76
Discharge: HOME | End: 2021-04-23
Payer: MEDICARE

## 2021-04-23 DIAGNOSIS — Z87.891 PERSONAL HISTORY OF TOBACCO USE: ICD-10-CM

## 2021-04-23 PROCEDURE — 71271 CT THORAX LUNG CANCER SCR C-: CPT

## 2021-04-26 ENCOUNTER — VIRTUAL VISIT (OUTPATIENT)
Dept: PULMONOLOGY | Age: 76
End: 2021-04-26
Payer: MEDICARE

## 2021-04-26 ENCOUNTER — TELEPHONE (OUTPATIENT)
Dept: PULMONOLOGY | Age: 76
End: 2021-04-26

## 2021-04-26 DIAGNOSIS — J47.9 BRONCHIECTASIS WITHOUT COMPLICATION (HCC): ICD-10-CM

## 2021-04-26 DIAGNOSIS — J43.9 BULLOUS EMPHYSEMA (HCC): ICD-10-CM

## 2021-04-26 DIAGNOSIS — R91.1 LUNG NODULE: Primary | ICD-10-CM

## 2021-04-26 PROCEDURE — 99214 OFFICE O/P EST MOD 30 MIN: CPT | Performed by: INTERNAL MEDICINE

## 2021-04-26 RX ORDER — ALBUTEROL SULFATE 90 UG/1
2 AEROSOL, METERED RESPIRATORY (INHALATION) EVERY 6 HOURS PRN
Qty: 1 INHALER | Refills: 6 | Status: SHIPPED | OUTPATIENT
Start: 2021-04-26 | End: 2022-05-23 | Stop reason: SDUPTHER

## 2021-04-26 NOTE — TELEPHONE ENCOUNTER
limited to the following:    Your Provider(s) may not able to provide medical treatment for your particular condition and you may be required to seek alternative healthcare or emergency care services.  The electronic systems or other security protocols or safeguards used in the Service could fail, causing a breach of privacy of your medical or other information.  Given regulatory requirements in certain jurisdictions, your Provider(s) diagnosis and/or treatment options, especially pertaining to certain prescriptions, may be limited. Acceptance   1. You understand that Services will be provided via Telehealth. This process involves the use of HIPAA compliant and secure, real-time audio-visual interfacing with a qualified and appropriately trained provider located at [facility]. 2. You understand that, under no circumstances, will this session be recorded. 3. You understand that the Provider(s) at [facility] and other clinical participants will be party to the information obtained during the Telehealth session in accordance with best medical practices. 4. You understand that the information obtained during the Telehealth session will be used to help determine the most appropriate treatment options. 5. You understand that You have the right to revoke this consent at any point in time. 6. You understand that Telehealth is voluntary, and that continued treatment is not dependent upon consent. 7. You understand that, in the event of non-consent to Telehealth services and/or technical difficulties, you will obtain services as typically provided in the absence of Telehealth technology. 8. You understand that this consent will be kept in Your medical record. 9. No potential benefits from the use of Telehealth or specific results can be guaranteed. Your condition may not be cured or improved and, in some cases, may get worse.    10. There are limitations in the provision of medical care and treatment via Telehealth and the Service and you may not be able to receive diagnosis and/or treatment through the Service for every condition for which you seek diagnosis and/or treatment. 11. There are potential risks to the use of Telehealth, including but not limited to the risks described in this Telehealth Consent. 12. Your Provider(s) have discussed the use of Telehealth and the Service with you, including the benefits and risks of such and you have provided oral consent to your Provider(s) for the use of Telehealth and the Service. 15. You understand that it is your duty to provide your Provider(s) truthful, accurate and complete information, including all relevant information regarding care that you may have received or may be receiving from other healthcare providers outside of the Service. 14. You understand that each of your Provider(s) may determine in his or sole discretion that your condition is not suitable for diagnosis and/or treatment using the Service, and that you may need to seek medical care and treatment a specialist or other healthcare provider, outside of the Service. 15. You understand that you are fully responsible for payment for all services provided by Provider(s) or through use of the Service and that you may not be able to use third-party insurance. 16. You represent that (a) you have read this Telehealth Consent carefully, (b) you understand the risks and benefits of the Service and the use of Telehealth in the medical care and treatment provided to you by Provider(s) using the Service, and (c) you have the legal capacity and authority to provide this consent for yourself and/or the minor for which you are consenting under applicable federal and state laws, including laws relating to the age of [de-identified] and/or parental/guardian consent.    17. You give your informed consent to the use of Telehealth by Provider(s) using the Service under the terms described in the Terms of Service and this Telehealth Consent. The patient was read the following statement and has consented to the visit as of 4/26/21. The patient has been scheduled for their first telehealth visit on 4/26/21 with Dr. Chioma Brown.

## 2021-04-26 NOTE — PROGRESS NOTES
[facility]                                                            Outpatient Follow Up Note  TELEHEALTH EVALUATION: Service performed was Audio/Visual (During [facility]) and not a face-to-face visit       CHIEF COMPLAINT: Follow up CT chest       HPI:   CT chest reviewed by me and noted below. Results were dicussed with patient and multiple good questions were answered. Some cough with clear sputum in am   No hemoptysis   Went back smoking 3-4 cig a week   Not needing to use the rescue inhaler- Albuterol         Past Medical History:   Diagnosis Date    Acid fast bacillus 09/21/2016    sputum - QuantiFERON-TB Gold is negative - DNA probe negative for TB    Aneurysm ([facility] [address] 2010    brain    Cancer ([facility] [address] 1999    lung    Cavitary lesion of lung     Hyperlipidemia     Hypertension     Pneumonia     Pulmonary nodules        Past Surgical History:        Procedure Laterality Date    BRONCHOSCOPY  09/21/2016    COLONOSCOPY  2012    FINGER SURGERY  1964    right ring finger    HERNIA REPAIR  1946 1947    LUNG CANCER SURGERY  1999    lobe removed right    NOSE SURGERY  1964    deviated septum    OTHER SURGICAL HISTORY  2010    cerebral aneurysm wire       Allergies:  is allergic to ace inhibitors. Social History:    TOBACCO:   reports that he quit smoking about 9 years ago. His smoking use included cigarettes. He has a 75.00 pack-year smoking history. He has never used smokeless tobacco.  ETOH:   reports current alcohol use.       Family History:   HTN in father and mother     Current Medications:    Current Outpatient Medications:     albuterol sulfate HFA (PROAIR HFA) 108 (90 Base) MCG/ACT inhaler, Inhale 2 puffs into the lungs every 6 hours as needed for Wheezing or Shortness of Breath, Disp: 1 Inhaler, Rfl: 6    pravastatin (PRAVACHOL) 40 MG tablet, Take 1 tablet by mouth daily, Disp: , Rfl: 1   losartan (COZAAR) 100 MG tablet, Take 100 mg by mouth daily. , Disp: , Rfl:     hydrochlorothiazide (HYDRODIURIL) 25 MG tablet, Take 25 mg by mouth daily Takes 0.5 tab daily, Disp: , Rfl:     aspirin 325 MG tablet, Take 325 mg by mouth daily. , Disp: , Rfl:       Objective:   PHYSICAL EXAM:    There were no vitals taken for this visit.' on RA  O2 Sat:  Constitutional:  No acute distress. Appears well developed and nourished. Eyes: No sclera icterus. EOM intact. No visible discharge. HENT: Head is normocephalic and atraumatic. Mucus membranes are moist and the tongue appears normal. Normal appearing nose. External Ears normal.   Neck: No visualized mass. [place] Hails is midline   Resp: No accessory muscle use. Respiratory effort normal. No visualized signs of difficulty breathing or respiratory distress. Cardiovascular: No LE edema. Musculoskeletal: Normal gait with no signs of ataxia. Normal range of motion of the neck. Skin: No significant exanthematous lesions or discoloration noted on facial skin    Neuro: Awake. Alert. Able to follow commands. No facial asymmetry. No gaze palsy. Psych: No agitation. Normal affect. No hallucinations. Oriented to person/time/place. No anxiety. Normal judgement and insight. DATA reviewed by me:   TIBURCIO TTNBx 8/18/2016: Tissue cores with necrosis, fibrosis, and chronic inflammation. No viable carcinoma is identified in the submitted material    Quantiferon(r) TB Gold (Incubated) Negative Negative     Bronch 9/21/2016:  RLL Brushing:No malignant cells identified. Benign bronchial cells, pulmonary macrophages, mixed leukocytes (lymphocytes, neutrophils) are present  Washing:No malignant cells identified. Benign bronchial cells, pulmonary macrophages and mixed leukocytes  (lymphocytes, neutrophils) are present. GMS stain is negative for fungal and pneumocystis jirovecii organisms.   Bronchial Lavage: No malignant cells identified Benign bronchial cells, pulmonary reviewed by me and showed  Centrilobular emphysema with bullous changes  Chronic fibrotic changes  Stable RLL 4 mm nodule  No new nodules      Assessment:       · Pulmonary bronchiectasis   · Bullous emphysema  · RLL 4 mm nodule stable on repeat CT chest 4/23/2020. Stable on repeat CT 4/23/2021. Favor granuloma    · RUL cavitary lesion with adjacent pulmonary nodules. 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 Ethambutol/Clarthromycin and Rifampin started 11/25/2016 with clinical and radiographic response. 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 AFB negative since 2/14/18. Off Mycobacterium medications since 3/14/2019     · New 1.4 cm RUL nodule on CT chest 3/15/2017. Resolved on repeat CT 9/12/2017. 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 Risks, benefits and alternatives including doing nothing were discussed with patient. · Advised to report any developing respiratory symptoms   · Continue albuterol 2 puffs Q4-6 hrs PRN  Patient is up to date with Covid, Pneumococcal vaccine and influenza vaccine. · Smoking cessation counseling  · Follow-up in 1 year or sooner if needed        . Gabriela Cole is a 77 y.o. male being evaluated by a Virtual Visit (video visit) encounter to address concerns as mentioned above. A caregiver was present when appropriate. Due to this being a TeleHealth encounter (During [place]-96 public health emergency), evaluation of the following organ systems was limited: Vitals/Constitutional/EENT/Resp/CV/GI//MS/Neuro/Skin/Heme-Lymph-Imm.   Pursuant to the emergency declaration under the [address] [facility], [address] [place] [address] authority and the [employer] General Act, this Virtual Visit was conducted with patient's (and/or legal guardian's) consent, to reduce the patient's risk of exposure to COVID-19 and provide necessary medical care. The patient (and/or legal guardian) has also been advised to contact this office for worsening conditions or problems, and seek emergency medical treatment and/or call 911 if deemed necessary. Services were provided through a video synchronous discussion virtually to substitute for in-person clinic visit. Patient was located in her home, provider was located in his office. --Brett aGrdiner MD on 4/26/2021 at 9:00 AM    An electronic signature was used to authenticate this note.

## 2021-04-26 NOTE — TELEPHONE ENCOUNTER
Pt needs 1 year follow up in April 2022 for CT follow up        LVV 4/26/21    Assessment:       · Pulmonary bronchiectasis   · RLL 4 mm nodule stable on repeat CT chest 4/23/2020. Stable on repeat CT 4/23/2021. Favor granuloma    · RUL cavitary lesion with adjacent pulmonary nodules. 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 Ethambutol/Clarthromycin and Rifampin started 11/25/2016 with clinical and radiographic response. 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 AFB negative since 2/14/18. Off Mycobacterium medications since 3/14/2019     · New 1.4 cm RUL nodule on CT chest 3/15/2017. Resolved on repeat CT 9/12/2017. 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 Risks, benefits and alternatives including doing nothing were discussed with patient. · Advised to report any developing respiratory symptoms   · Continue albuterol 2 puffs Q4-6 hrs PRN  · Patient is up to date with Covid, Pneumococcal vaccine and influenza vaccine.      · Smoking cessation counseling  · Follow-up in 1 year or sooner if needed

## 2022-03-03 ENCOUNTER — TELEPHONE (OUTPATIENT)
Dept: PULMONOLOGY | Age: 77
End: 2022-03-03

## 2022-03-03 NOTE — TELEPHONE ENCOUNTER
Patient cancelled appointment on 4/25/22 with Jeny Cheung for f/u ct lung screen. Reason: out of town    Patient did reschedule appointment. Appointment rescheduled for 6/29/22 for Ct lung screen 6/30/22 Dr Jeny Cheung. Last OV 4/26/21       Assessment:       · Pulmonary bronchiectasis   · Bullous emphysema  · RLL 4 mm nodule stable on repeat CT chest 4/23/2020. Stable on repeat CT 4/23/2021. Favor granuloma    · RUL cavitary lesion with adjacent pulmonary nodules. 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 Ethambutol/Clarthromycin and Rifampin started 11/25/2016 with clinical and radiographic response. 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 AFB negative since 2/14/18. Off Mycobacterium medications since 3/14/2019     · New 1.4 cm RUL nodule on CT chest 3/15/2017. Resolved on repeat CT 9/12/2017. 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 Risks, benefits and alternatives including doing nothing were discussed with patient. · Advised to report any developing respiratory symptoms   · Continue albuterol 2 puffs Q4-6 hrs PRN  · Patient is up to date with Covid, Pneumococcal vaccine and influenza vaccine.      · Smoking cessation counseling  · Follow-up in 1 year or sooner if needed

## 2022-05-23 RX ORDER — ALBUTEROL SULFATE 90 UG/1
2 AEROSOL, METERED RESPIRATORY (INHALATION) EVERY 6 HOURS PRN
Qty: 1 EACH | Refills: 2 | Status: SHIPPED | OUTPATIENT
Start: 2022-05-23

## 2022-05-24 ENCOUNTER — TELEPHONE (OUTPATIENT)
Dept: PULMONOLOGY | Age: 77
End: 2022-05-24

## 2022-05-24 NOTE — TELEPHONE ENCOUNTER
Pt was sched for annual CT on 6/29 and f/u OV with Dr. Judah Ron on 6/30. Dr. Judah Ron will be out of town that day and pt says he was coming from [place] to have the CT scan and appointment done, then going back to [place] afterwards. [facility] Will check with Dr. Judah Ron to see if we can add pt in on Tues. 6/21 since he's made special arrangements already to come up for this apt. Will fit CT scan in where we can (hopefully on 6/20).

## 2022-06-20 ENCOUNTER — HOSPITAL ENCOUNTER (OUTPATIENT)
Dept: CT IMAGING | Age: 77
Discharge: HOME | End: 2022-06-20
Payer: MEDICARE

## 2022-06-20 DIAGNOSIS — J47.9 BRONCHIECTASIS WITHOUT COMPLICATION (HCC): ICD-10-CM

## 2022-06-20 DIAGNOSIS — R91.1 LUNG NODULE: ICD-10-CM

## 2022-06-20 PROCEDURE — 71271 CT THORAX LUNG CANCER SCR C-: CPT

## 2022-06-21 ENCOUNTER — OFFICE VISIT (OUTPATIENT)
Dept: PULMONOLOGY | Age: 77
End: 2022-06-21
Payer: MEDICARE

## 2022-06-21 ENCOUNTER — TELEPHONE (OUTPATIENT)
Dept: PULMONOLOGY | Age: 77
End: 2022-06-21

## 2022-06-21 VITALS
HEART RATE: 84 BPM | SYSTOLIC BLOOD PRESSURE: 144 MMHG | BODY MASS INDEX: 22.08 KG/M2 | HEIGHT: 72 IN | OXYGEN SATURATION: 95 % | DIASTOLIC BLOOD PRESSURE: 62 MMHG | WEIGHT: 163 LBS

## 2022-06-21 DIAGNOSIS — J98.4 CAVITARY LUNG DISEASE: Primary | ICD-10-CM

## 2022-06-21 DIAGNOSIS — A31.8 MYCOBACTERIUM ABSCESSUS IDENTIFIED ON DIAGNOSTIC TESTING: Primary | ICD-10-CM

## 2022-06-21 DIAGNOSIS — J98.4 CAVITARY LUNG DISEASE: ICD-10-CM

## 2022-06-21 DIAGNOSIS — Z22.39 MYCOBACTERIUM ABSCESSUS COLONIZATION: ICD-10-CM

## 2022-06-21 PROCEDURE — 1123F ACP DISCUSS/DSCN MKR DOCD: CPT | Performed by: INTERNAL MEDICINE

## 2022-06-21 PROCEDURE — 99214 OFFICE O/P EST MOD 30 MIN: CPT | Performed by: INTERNAL MEDICINE

## 2022-06-21 NOTE — TELEPHONE ENCOUNTER
Bronchoscopy with right apical cavitary lesion robotic bronchoscopy guided biopsy; Dr Waunita Peabody or Dr Sary Emanuel.        Ct/PET rosalino 6/24/22 arrival 12:45pm.

## 2022-06-21 NOTE — PROGRESS NOTES
[facility]                                                            Outpatient Follow Up Note      CHIEF COMPLAINT: Follow up CT chest       HPI:   CT chest reviewed by me and noted below. Results were dicussed with patient and multiple good questions were answered. Feels more short of breath, progressive past 1 year  Cough with clear sputum- twice a day   No hemoptysis   No weight loss   No smoking since April 2021   Not needing to use the rescue inhaler- Albuterol         Past Medical History:   Diagnosis Date    Acid fast bacillus 09/21/2016    sputum - QuantiFERON-TB Gold is negative - DNA probe negative for TB    Aneurysm ([facility] [address] 2010    brain    Cancer ([facility] [address] 1999    lung    Cavitary lesion of lung     Hyperlipidemia     Hypertension     Pneumonia     Pulmonary nodules        Past Surgical History:        Procedure Laterality Date    BRONCHOSCOPY  09/21/2016    COLONOSCOPY  2012    FINGER SURGERY  1964    right ring finger    HERNIA REPAIR  1946 1947    LUNG CANCER SURGERY  1999    lobe removed right    NOSE SURGERY  1964    deviated septum    OTHER SURGICAL HISTORY  2010    cerebral aneurysm wire       Allergies:  is allergic to ace inhibitors. Social History:    TOBACCO:   reports that he quit smoking about 10 years ago. His smoking use included cigarettes. He has a 75.00 pack-year smoking history. He has never used smokeless tobacco.  ETOH:   reports current alcohol use. Family History:   HTN in father and mother     Current Medications:    Current Outpatient Medications:     albuterol sulfate HFA (PROAIR HFA) 108 (90 Base) MCG/ACT inhaler, Inhale 2 puffs into the lungs every 6 hours as needed for Wheezing or Shortness of Breath, Disp: 1 each, Rfl: 2    pravastatin (PRAVACHOL) 40 MG tablet, Take 1 tablet by mouth daily, Disp: , Rfl: 1    losartan (COZAAR) 100 MG tablet, Take 100 mg by mouth daily.   , Disp: , Rfl:     hydrochlorothiazide (HYDRODIURIL) 25 MG tablet, Take 25 mg by mouth daily Takes 0.5 tab daily, Disp: , Rfl:     aspirin 325 MG tablet, Take 325 mg by mouth daily. , Disp: , Rfl:       Objective:   BP (!) 144/62 (Site: Left Upper Arm, Position: Sitting, Cuff Size: Medium Adult)   Pulse 84   Ht 6' (1.829 m)   Wt 163 lb (73.9 kg)   SpO2 95% Comment: RA  BMI 22.11 kg/m²   Gen: No distress. Eyes: PERRL. No sclera icterus. No conjunctival injection. ENT: No discharge. Pharynx clear. Neck: Trachea midline. No obvious mass. Resp: No accessory muscle use. No crackles. No wheezes. No rhonchi. No dullness on percussion. Good air entry. CV: Regular rate. Regular rhythm. No murmur or rub. No edema. GI: Non-tender. Non-distended. No hernia. Skin: Warm and dry. No nodule on exposed extremities. Lymph: No cervical LAD. No supraclavicular LAD. M/S: No cyanosis. No joint deformity. No clubbing. Neuro: Awake. Alert. Moves all four extremities. Psych: Oriented x 3. No anxiety. DATA reviewed by me:   TIBURCIO TTNBx 8/18/2016: Tissue cores with necrosis, fibrosis, and chronic inflammation. No viable carcinoma is identified in the submitted material    Quantiferon(r) TB Gold (Incubated) Negative Negative     Bronch 9/21/2016:  RLL Brushing:No malignant cells identified. Benign bronchial cells, pulmonary macrophages, mixed leukocytes (lymphocytes, neutrophils) are present  Washing:No malignant cells identified. Benign bronchial cells, pulmonary macrophages and mixed leukocytes  (lymphocytes, neutrophils) are present. GMS stain is negative for fungal and pneumocystis jirovecii organisms. Bronchial Lavage: No malignant cells identified Benign bronchial cells, pulmonary macrophages and mixed leukocytes (lymphocytes, neutrophils) are present. GMS stain is negative for fungal and pneumocystis jirovecii  Biopsies from right lower lobe:Negative for malignancy. Negative for granulomatous inflammation. Congestion with hemosiderin-laden macrophages. Negative AFB and GMS stain     Histoplasma Ab Mycelial CF <1:8 <1:8     Histoplasma Ab Yeast CF <1:8 1:8 (H)      Histoplasma Ag Interp Not Detected Not Detected           Bronchwash 9/21/2016 Mycobacterium xenopi  BronchBAL   09/21/2016 Negative  Sputum AFB 09/23/2016 Mycobacterium xenopi  Sputum AFB 09/24/2016 Negative    Sputum AFB 09/25/2016 Negative   Sputum AFB 01/18/2017 Positive    Sputum AFB 03/16/2017 Negative   Sputum AFB 04/17/2017 Negative   Sputum AFB 05/13/2017 Negative   Sputum AFB 06/13/2017 Positive  Sputum AFB 07/18/2017 Negative   Sputum AFB 08/15/2017 Negative   Sputum AFB 09/15/2017 Negative   Sputum AFB 11/06/2017 Negative   Sputum AFB 12/19/2017 Positive Mycobacterium Avium   Sputum AFB 02/14/2018 Negative  Sputum AFB 03/16/2018 Negative   Sputum AFB 05/15/2018 Negative   Sputum AFB 06/19/2018 Negative   Sputum AFB 07/16/2018 Negative  Sputum AFB 08/15/2018 Negative  Sputum AFB 09/14/2018 Negative  Sputum AFB 10/15/2018 Negative  Sputum AFB 11/14/2018 Negative  Sputum AFB 12/17/2018 Negative  Sputum AFB 01/15/2019 Negative  Sputum AFB 02/14/2019 Negative    CT chest 3/13/2018  Persistent but decreased size of cavitary focus right upper lobe. No change in curvilinear nodular opacity in the left upper lobe.    Underlying emphysema with post lobectomy changes     CT chest 3/13/19   Stable 4 mm right lower lobe nodule    Chest x-ray 3/13/2020  Pulmonary emphysema  No acute consultation  Right basilar atelectasis/scarring    CT chest 4/23/2020 imaging reviewed by me and showed  Advanced emphysema with parenchymal scarring at the left apex  Stable noncalcified 4 mm right lower lobe nodule    CT chest 4/23/2021 imaging reviewed by me and showed  Centrilobular emphysema with bullous changes  Chronic fibrotic changes  Stable RLL 4 mm nodule  No new nodules    CT chest 6/20/2022 imaging reviewed by me and showed  4 x 2 mm nodule, right lower lobe, image 32, stable. No new pulmonary nodule. Advanced centrilobular emphysema with bullous changes once again noted. Stable pattern of fibrotic scarring in the left apex. In the right apex, cavitary change has developed within a previous area of fibrosis. This is best appreciated in the coronal plane with cavitary lesion measuring 3.3 x 1.3 cm. Bronchiectasis is demonstrated with additional fibrotic changes in the periphery of the lower lobes with overall pattern suggesting pulmonary fibrosis. Mild atherosclerotic calcification of the coronary arteries. Assessment:       · Right apical cavitary lesion with adjacent pulmonary nodules. 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 Ethambutol/Clarthromycin and Rifampin started 11/25/2016 with clinical and radiographic response. 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 AFB negative since 2/14/18. Off Mycobacterium medications since 3/14/2019. Recavitation/worsening on repeat CT 6/20/2022-nontuberculous mycobacterial infection versus malignancy     · Pulmonary bronchiectasis   · Bullous emphysema  · RLL 4 mm nodule stable on repeat CT chest 4/23/2020. Stable on repeat CT 6/20/2022. 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 Patient is leaving to [place] [id number] and requesting for procedure to be done before he leaves. Unfortunately, I am off next 10 days and efforts will be made to perform procedure next week by Dr. Kelly Rodríguez. The risks and benefits of Bronchoscopy , alternatives to the procedure and doing nothing have been discussed with patient including complications (collapse lung, bleed, infection, mechanical ventilation, hospitalization, cardiopulmonary arrest and death).  We also discussed the risks of sedation/anesthesia. It is my assessment that the benefit of the invasive procedure outweighs  the risk. The patient understands and wishes to proceed. · Continue albuterol 2 puffs Q4-6 hrs PRN  · Patient is up to date with Covid, Pneumococcal vaccine and influenza vaccine. · Advised to continue with smoking cessation.

## 2022-06-22 NOTE — TELEPHONE ENCOUNTER
Radiology making imaging disc of CT lung screen. Will have Dr. Merlin Elaine review to make sure monarch bronch can be planned from this CT. Also, watch for results for PET scan 6/24/22.

## 2022-06-23 NOTE — PROGRESS NOTES
..1.  Do not eat or drink anything after 12 midnight prior to surgery. This includes no water, chewing gum or mints, except for bowel prep complete per MD.  2.  Take the following pills with a small sip of water on the morning of surgery 06/28/2022.  3.  Aspirin, Ibuprofen, Advil, Naproxen, Vitamin E and other Anti-inflammatory products should be stopped for 5 days before surgery or as directed by your physician. 4.  Check with your doctor regarding stopping Plavix, Coumadin, Lovenox, Fragmin or other blood thinners. 5.  Do not smoke and do not drink alcoholic beverages 24 hours prior to surgery. This includes NA Beer. 6.  You may brush your teeth and gargle the morning of surgery. DO NOT SWALLOW WATER.  7.  You MUST make arrangements for a responsible adult to take you home after your surgery. You will not be allowed to leave alone or drive yourself home. It is strongly suggested someone stay with you the first 24 hours. Your surgery will be cancelled if you do not have a ride home. 8.  A parent/legal guardian must accompany a child scheduled for surgery and plan to stay at the hospital until the child is discharged. Please do not bring other children with you. 9.  Please wear simple, loose fitting clothing to the hospital.  Pricila Dense not bring valuables ( money, credit cards, checkbooks, etc.)  Do not wear any makeup (including no eye makeup) or nail polish on your fingers or toes. 10.  Do not wear any jewelry or piercing on the day of surgery. All body piercing jewelry must be removed. 11.  If you have dentures, they will be removed before going to the OR; we will provide you a container. If you wear contact lenses or glasses, they will be removed; please bring a case for them.   15.  Notify your Surgeon if you develop any illness between now and surgery time; cough, cold, fever, sore throat, nausea, vomiting, etc.  Please notify your surgeon if you experience dizziness, shortness of breath or blurred vision between now and the time of your surgery. To provide excellent care, visitors will be limited to two in a room at any given time. Please no children under the age of 15 in the surgical department.

## 2022-06-23 NOTE — PROGRESS NOTES
PAT completed with patient orders placed per MD, patient his wife Bebe Munguia will be his  and caregiver day of surgery. Tha Funez RN

## 2022-06-24 ENCOUNTER — ANESTHESIA EVENT (OUTPATIENT)
Dept: ENDOSCOPY | Age: 77
End: 2022-06-24
Payer: MEDICARE

## 2022-06-24 ENCOUNTER — HOSPITAL ENCOUNTER (OUTPATIENT)
Dept: PET IMAGING | Age: 77
Discharge: HOME | End: 2022-06-24
Payer: MEDICARE

## 2022-06-24 DIAGNOSIS — J98.4 CAVITARY LUNG DISEASE: ICD-10-CM

## 2022-06-24 PROCEDURE — A9552 F18 FDG: HCPCS | Performed by: INTERNAL MEDICINE

## 2022-06-24 PROCEDURE — 78815 PET IMAGE W/CT SKULL-THIGH: CPT

## 2022-06-24 PROCEDURE — 3430000000 HC RX DIAGNOSTIC RADIOPHARMACEUTICAL: Performed by: INTERNAL MEDICINE

## 2022-06-24 RX ORDER — FLUDEOXYGLUCOSE F 18 200 MCI/ML
14.87 INJECTION, SOLUTION INTRAVENOUS
Status: COMPLETED | OUTPATIENT
Start: 2022-06-24 | End: 2022-06-24

## 2022-06-24 RX ADMIN — FLUDEOXYGLUCOSE F 18 14.87 MILLICURIE: 200 INJECTION, SOLUTION INTRAVENOUS at 13:08

## 2022-06-27 NOTE — H&P
Fiberoptic bronchoscopy history:     Patient with cavitary right apical nodule; requires fiberoptic bronchoscopy with biopsy. Patient is allergic to ace inhibitors. Past Medical History:   Diagnosis Date    Acid fast bacillus 09/21/2016    sputum - QuantiFERON-TB Gold is negative - DNA probe negative for TB    Aneurysm ([facility] [address] 2010    brain    Cancer ([facility] [address] 1999    lung    Cavitary lesion of lung     Hyperlipidemia     Hypertension     Pneumonia     Pulmonary nodules        Past Surgical History:   Procedure Laterality Date    BRONCHOSCOPY  09/21/2016    COLONOSCOPY  2012    FINGER SURGERY  1964    right ring finger    HERNIA REPAIR  1946 1947    LUNG CANCER SURGERY  1999    lobe removed right    NOSE SURGERY  1964    deviated septum    OTHER SURGICAL HISTORY  2010    cerebral aneurysm wire       Allergies   Allergen Reactions    Ace Inhibitors Other (See Comments)       No current facility-administered medications on file prior to encounter. Current Outpatient Medications on File Prior to Encounter   Medication Sig Dispense Refill    albuterol sulfate HFA (PROAIR HFA) 108 (90 Base) MCG/ACT inhaler Inhale 2 puffs into the lungs every 6 hours as needed for Wheezing or Shortness of Breath 1 each 2    pravastatin (PRAVACHOL) 40 MG tablet Take 1 tablet by mouth daily  1    losartan (COZAAR) 100 MG tablet Take 100 mg by mouth daily.  hydrochlorothiazide (HYDRODIURIL) 25 MG tablet Take 25 mg by mouth daily Takes 0.5 tab daily      aspirin 325 MG tablet Take 325 mg by mouth daily. reports that he quit smoking about 10 years ago. His smoking use included cigarettes. He has a 75.00 pack-year smoking history. He has never used smokeless tobacco.    family history includes Hypertension in his father and mother. Height 5' 11\" (1.803 m), weight 162 lb (73.5 kg). HENT: Airway patent and reviewed. Mallampati 2  Cardiovascular: Normal rate, regular rhythm, normal heart sounds. Pulmonary/Chest: No increased work of breathing. CTA bilaterally  Abdominal: Soft. No distension. ASA CLASS      III. Severe Systemic Disease      Sedation plan: Anesthesia      Post Procedure Plan   Return to same level of care   ______________________     The risks and benefits of fiberoptic bronchoscopy were specifically discussed, including the goal of obtaining a diagnosis, the risks of bleeding, infection, pneumothorax, lung collapse, hospitalization and death. We specifically discussed the potential for biopsy and complications related to biopsy. We also discussed the risks of sedation/anesthesia. It is my assessment that the risk of the invasive procedure is outweighed by the benefit. Patient was counseled regarding the recommended procedure, alternatives to the procedure, and possible consequences of not having any evaluation performed.

## 2022-06-28 ENCOUNTER — HOSPITAL ENCOUNTER (OUTPATIENT)
Age: 77
Setting detail: OUTPATIENT SURGERY
Discharge: HOME | End: 2022-06-28
Attending: INTERNAL MEDICINE | Admitting: INTERNAL MEDICINE
Payer: MEDICARE

## 2022-06-28 ENCOUNTER — ANESTHESIA (OUTPATIENT)
Dept: ENDOSCOPY | Age: 77
End: 2022-06-28
Payer: MEDICARE

## 2022-06-28 ENCOUNTER — APPOINTMENT (OUTPATIENT)
Dept: GENERAL RADIOLOGY | Age: 77
End: 2022-06-28
Attending: INTERNAL MEDICINE
Payer: MEDICARE

## 2022-06-28 VITALS
HEIGHT: 71 IN | OXYGEN SATURATION: 94 % | BODY MASS INDEX: 22.68 KG/M2 | DIASTOLIC BLOOD PRESSURE: 83 MMHG | TEMPERATURE: 97.1 F | SYSTOLIC BLOOD PRESSURE: 146 MMHG | HEART RATE: 68 BPM | WEIGHT: 162 LBS | RESPIRATION RATE: 16 BRPM

## 2022-06-28 LAB
ANION GAP SERPL CALCULATED.3IONS-SCNC: 8 MMOL/L (ref 3–16)
BUN BLDV-MCNC: 18 MG/DL (ref 7–20)
CALCIUM SERPL-MCNC: 9.3 MG/DL (ref 8.3–10.6)
CHLORIDE BLD-SCNC: 104 MMOL/L (ref 99–110)
CO2: 30 MMOL/L (ref 21–32)
CREAT SERPL-MCNC: 0.9 MG/DL (ref 0.8–1.3)
EKG ATRIAL RATE: 55 BPM
EKG DIAGNOSIS: NORMAL
EKG P AXIS: 83 DEGREES
EKG P-R INTERVAL: 162 MS
EKG Q-T INTERVAL: 454 MS
EKG QRS DURATION: 96 MS
EKG QTC CALCULATION (BAZETT): 434 MS
EKG R AXIS: 45 DEGREES
EKG T AXIS: 78 DEGREES
EKG VENTRICULAR RATE: 55 BPM
GFR AFRICAN AMERICAN: >60
GFR NON-AFRICAN AMERICAN: >60
GLUCOSE BLD-MCNC: 109 MG/DL (ref 70–99)
HCT VFR BLD CALC: 39.8 % (ref 40.5–52.5)
HEMOGLOBIN: 13.9 G/DL (ref 13.5–17.5)
MCH RBC QN AUTO: 31.7 PG (ref 26–34)
MCHC RBC AUTO-ENTMCNC: 35.1 G/DL (ref 31–36)
MCV RBC AUTO: 90.3 FL (ref 80–100)
PDW BLD-RTO: 13.5 % (ref 12.4–15.4)
PLATELET # BLD: 271 K/UL (ref 135–450)
PMV BLD AUTO: 7.4 FL (ref 5–10.5)
POTASSIUM REFLEX MAGNESIUM: 4.1 MMOL/L (ref 3.5–5.1)
RBC # BLD: 4.4 M/UL (ref 4.2–5.9)
SODIUM BLD-SCNC: 142 MMOL/L (ref 136–145)
WBC # BLD: 13.5 K/UL (ref 4–11)

## 2022-06-28 PROCEDURE — 36415 COLL VENOUS BLD VENIPUNCTURE: CPT

## 2022-06-28 PROCEDURE — 88177 CYTP FNA EVAL EA ADDL: CPT

## 2022-06-28 PROCEDURE — 3609027000 HC BRONCHOSCOPY: Performed by: INTERNAL MEDICINE

## 2022-06-28 PROCEDURE — 31627 NAVIGATIONAL BRONCHOSCOPY: CPT | Performed by: INTERNAL MEDICINE

## 2022-06-28 PROCEDURE — 6360000002 HC RX W HCPCS: Performed by: NURSE ANESTHETIST, CERTIFIED REGISTERED

## 2022-06-28 PROCEDURE — 85027 COMPLETE CBC AUTOMATED: CPT

## 2022-06-28 PROCEDURE — 3609011300 HC BRONCHOSCOPY BRONCHIAL/ENDOBRNCL BX 1+ SITES: Performed by: INTERNAL MEDICINE

## 2022-06-28 PROCEDURE — 7100000011 HC PHASE II RECOVERY - ADDTL 15 MIN: Performed by: INTERNAL MEDICINE

## 2022-06-28 PROCEDURE — 7100000001 HC PACU RECOVERY - ADDTL 15 MIN: Performed by: INTERNAL MEDICINE

## 2022-06-28 PROCEDURE — 2709999900 HC NON-CHARGEABLE SUPPLY: Performed by: INTERNAL MEDICINE

## 2022-06-28 PROCEDURE — 76000 FLUOROSCOPY <1 HR PHYS/QHP: CPT

## 2022-06-28 PROCEDURE — 87205 SMEAR GRAM STAIN: CPT

## 2022-06-28 PROCEDURE — 87015 SPECIMEN INFECT AGNT CONCNTJ: CPT

## 2022-06-28 PROCEDURE — 2720000010 HC SURG SUPPLY STERILE: Performed by: INTERNAL MEDICINE

## 2022-06-28 PROCEDURE — 88172 CYTP DX EVAL FNA 1ST EA SITE: CPT

## 2022-06-28 PROCEDURE — 3700000000 HC ANESTHESIA ATTENDED CARE: Performed by: INTERNAL MEDICINE

## 2022-06-28 PROCEDURE — 80048 BASIC METABOLIC PNL TOTAL CA: CPT

## 2022-06-28 PROCEDURE — 87116 MYCOBACTERIA CULTURE: CPT

## 2022-06-28 PROCEDURE — 7100000000 HC PACU RECOVERY - FIRST 15 MIN: Performed by: INTERNAL MEDICINE

## 2022-06-28 PROCEDURE — 93005 ELECTROCARDIOGRAM TRACING: CPT

## 2022-06-28 PROCEDURE — 87206 SMEAR FLUORESCENT/ACID STAI: CPT

## 2022-06-28 PROCEDURE — 88305 TISSUE EXAM BY PATHOLOGIST: CPT

## 2022-06-28 PROCEDURE — 3609011900 HC BRONCHOSCOPY NEEDLE BX TRACHEA MAIN STEM&/BRON: Performed by: INTERNAL MEDICINE

## 2022-06-28 PROCEDURE — 7100000010 HC PHASE II RECOVERY - FIRST 15 MIN: Performed by: INTERNAL MEDICINE

## 2022-06-28 PROCEDURE — 31628 BRONCHOSCOPY/LUNG BX EACH: CPT | Performed by: INTERNAL MEDICINE

## 2022-06-28 PROCEDURE — 88173 CYTOPATH EVAL FNA REPORT: CPT

## 2022-06-28 PROCEDURE — 71045 X-RAY EXAM CHEST 1 VIEW: CPT

## 2022-06-28 PROCEDURE — 2580000003 HC RX 258: Performed by: NURSE ANESTHETIST, CERTIFIED REGISTERED

## 2022-06-28 PROCEDURE — 31624 DX BRONCHOSCOPE/LAVAGE: CPT | Performed by: INTERNAL MEDICINE

## 2022-06-28 PROCEDURE — 2500000003 HC RX 250 WO HCPCS: Performed by: NURSE ANESTHETIST, CERTIFIED REGISTERED

## 2022-06-28 PROCEDURE — 3700000001 HC ADD 15 MINUTES (ANESTHESIA): Performed by: INTERNAL MEDICINE

## 2022-06-28 PROCEDURE — 88312 SPECIAL STAINS GROUP 1: CPT

## 2022-06-28 PROCEDURE — 31629 BRONCHOSCOPY/NEEDLE BX EACH: CPT | Performed by: INTERNAL MEDICINE

## 2022-06-28 PROCEDURE — 87070 CULTURE OTHR SPECIMN AEROBIC: CPT

## 2022-06-28 PROCEDURE — 88112 CYTOPATH CELL ENHANCE TECH: CPT

## 2022-06-28 PROCEDURE — 3609010800 HC BRONCHOSCOPY ALVEOLAR LAVAGE: Performed by: INTERNAL MEDICINE

## 2022-06-28 PROCEDURE — 87102 FUNGUS ISOLATION CULTURE: CPT

## 2022-06-28 RX ORDER — SODIUM CHLORIDE 9 MG/ML
INJECTION, SOLUTION INTRAVENOUS PRN
Status: DISCONTINUED | OUTPATIENT
Start: 2022-06-28 | End: 2022-06-28 | Stop reason: HOSPADM

## 2022-06-28 RX ORDER — SODIUM CHLORIDE, SODIUM LACTATE, POTASSIUM CHLORIDE, CALCIUM CHLORIDE 600; 310; 30; 20 MG/100ML; MG/100ML; MG/100ML; MG/100ML
INJECTION, SOLUTION INTRAVENOUS CONTINUOUS PRN
Status: DISCONTINUED | OUTPATIENT
Start: 2022-06-28 | End: 2022-06-28 | Stop reason: SDUPTHER

## 2022-06-28 RX ORDER — MEPERIDINE HYDROCHLORIDE 25 MG/ML
12.5 INJECTION INTRAMUSCULAR; INTRAVENOUS; SUBCUTANEOUS EVERY 5 MIN PRN
Status: DISCONTINUED | OUTPATIENT
Start: 2022-06-28 | End: 2022-06-28 | Stop reason: HOSPADM

## 2022-06-28 RX ORDER — ONDANSETRON 2 MG/ML
INJECTION INTRAMUSCULAR; INTRAVENOUS PRN
Status: DISCONTINUED | OUTPATIENT
Start: 2022-06-28 | End: 2022-06-28 | Stop reason: SDUPTHER

## 2022-06-28 RX ORDER — SODIUM CHLORIDE 9 MG/ML
25 INJECTION, SOLUTION INTRAVENOUS PRN
Status: DISCONTINUED | OUTPATIENT
Start: 2022-06-28 | End: 2022-06-28 | Stop reason: HOSPADM

## 2022-06-28 RX ORDER — PROPOFOL 10 MG/ML
INJECTION, EMULSION INTRAVENOUS PRN
Status: DISCONTINUED | OUTPATIENT
Start: 2022-06-28 | End: 2022-06-28 | Stop reason: SDUPTHER

## 2022-06-28 RX ORDER — SODIUM CHLORIDE, SODIUM LACTATE, POTASSIUM CHLORIDE, CALCIUM CHLORIDE 600; 310; 30; 20 MG/100ML; MG/100ML; MG/100ML; MG/100ML
INJECTION, SOLUTION INTRAVENOUS CONTINUOUS
Status: DISCONTINUED | OUTPATIENT
Start: 2022-06-28 | End: 2022-06-28 | Stop reason: HOSPADM

## 2022-06-28 RX ORDER — SODIUM CHLORIDE 0.9 % (FLUSH) 0.9 %
5-40 SYRINGE (ML) INJECTION PRN
Status: DISCONTINUED | OUTPATIENT
Start: 2022-06-28 | End: 2022-06-28 | Stop reason: HOSPADM

## 2022-06-28 RX ORDER — FAMOTIDINE 10 MG/ML
20 INJECTION, SOLUTION INTRAVENOUS ONCE
Status: DISCONTINUED | OUTPATIENT
Start: 2022-06-28 | End: 2022-06-28 | Stop reason: HOSPADM

## 2022-06-28 RX ORDER — SODIUM CHLORIDE 0.9 % (FLUSH) 0.9 %
5-40 SYRINGE (ML) INJECTION EVERY 12 HOURS SCHEDULED
Status: DISCONTINUED | OUTPATIENT
Start: 2022-06-28 | End: 2022-06-28 | Stop reason: HOSPADM

## 2022-06-28 RX ORDER — OXYCODONE HYDROCHLORIDE 5 MG/1
5 TABLET ORAL PRN
Status: DISCONTINUED | OUTPATIENT
Start: 2022-06-28 | End: 2022-06-28 | Stop reason: HOSPADM

## 2022-06-28 RX ORDER — LIDOCAINE HYDROCHLORIDE 20 MG/ML
INJECTION, SOLUTION INFILTRATION; PERINEURAL PRN
Status: DISCONTINUED | OUTPATIENT
Start: 2022-06-28 | End: 2022-06-28 | Stop reason: SDUPTHER

## 2022-06-28 RX ORDER — OXYCODONE HYDROCHLORIDE 5 MG/1
10 TABLET ORAL PRN
Status: DISCONTINUED | OUTPATIENT
Start: 2022-06-28 | End: 2022-06-28 | Stop reason: HOSPADM

## 2022-06-28 RX ORDER — ROCURONIUM BROMIDE 10 MG/ML
INJECTION, SOLUTION INTRAVENOUS PRN
Status: DISCONTINUED | OUTPATIENT
Start: 2022-06-28 | End: 2022-06-28 | Stop reason: SDUPTHER

## 2022-06-28 RX ORDER — ONDANSETRON 2 MG/ML
4 INJECTION INTRAMUSCULAR; INTRAVENOUS
Status: DISCONTINUED | OUTPATIENT
Start: 2022-06-28 | End: 2022-06-28 | Stop reason: HOSPADM

## 2022-06-28 RX ADMIN — PHENYLEPHRINE HYDROCHLORIDE 100 MCG: 10 INJECTION INTRAVENOUS at 08:46

## 2022-06-28 RX ADMIN — ROCURONIUM BROMIDE 10 MG: 10 INJECTION, SOLUTION INTRAVENOUS at 08:38

## 2022-06-28 RX ADMIN — PHENYLEPHRINE HYDROCHLORIDE 100 MCG: 10 INJECTION INTRAVENOUS at 07:55

## 2022-06-28 RX ADMIN — PHENYLEPHRINE HYDROCHLORIDE 100 MCG: 10 INJECTION INTRAVENOUS at 08:16

## 2022-06-28 RX ADMIN — PHENYLEPHRINE HYDROCHLORIDE 200 MCG: 10 INJECTION INTRAVENOUS at 08:35

## 2022-06-28 RX ADMIN — SUGAMMADEX 200 MG: 100 INJECTION, SOLUTION INTRAVENOUS at 08:52

## 2022-06-28 RX ADMIN — PROPOFOL 150 MG: 10 INJECTION, EMULSION INTRAVENOUS at 07:17

## 2022-06-28 RX ADMIN — ROCURONIUM BROMIDE 50 MG: 10 INJECTION, SOLUTION INTRAVENOUS at 07:17

## 2022-06-28 RX ADMIN — PHENYLEPHRINE HYDROCHLORIDE 100 MCG: 10 INJECTION INTRAVENOUS at 08:03

## 2022-06-28 RX ADMIN — SUGAMMADEX 200 MG: 100 INJECTION, SOLUTION INTRAVENOUS at 08:56

## 2022-06-28 RX ADMIN — ONDANSETRON HYDROCHLORIDE 4 MG: 2 INJECTION, SOLUTION INTRAMUSCULAR; INTRAVENOUS at 08:52

## 2022-06-28 RX ADMIN — SODIUM CHLORIDE, POTASSIUM CHLORIDE, SODIUM LACTATE AND CALCIUM CHLORIDE: 600; 310; 30; 20 INJECTION, SOLUTION INTRAVENOUS at 07:14

## 2022-06-28 RX ADMIN — LIDOCAINE HYDROCHLORIDE 40 MG: 20 INJECTION, SOLUTION INFILTRATION; PERINEURAL at 07:17

## 2022-06-28 RX ADMIN — PHENYLEPHRINE HYDROCHLORIDE 100 MCG: 10 INJECTION INTRAVENOUS at 08:20

## 2022-06-28 ASSESSMENT — LIFESTYLE VARIABLES: SMOKING_STATUS: 0

## 2022-06-28 ASSESSMENT — ENCOUNTER SYMPTOMS: SHORTNESS OF BREATH: 0

## 2022-06-28 NOTE — ANESTHESIA POSTPROCEDURE EVALUATION
Department of Anesthesiology  Postprocedure Note    Patient: Dee Fisher  MRN: [id number]  YOB: 1945  Date of evaluation: 6/28/2022      Procedure Summary     Date: 06/28/22 Room / Location: [place] [address] / [facility]    Anesthesia Start: 4319 Anesthesia Stop: 0912    Procedures:       Budd Hippo (7:00) NO LABS (N/A )      BRONCHOSCOPY      BRONCHOSCOPY/TRANSBRONCHIAL NEEDLE BIOPSY ROBOTIC      BRONCHOSCOPY BIOPSY BRONCHUS ROBOTIC Diagnosis:       Cavitary lung disease      (CAVITARY LUNG DISEASE)    Surgeons: Gladys Navarro MD Responsible Provider: Hayley Dalton MD    Anesthesia Type: general ASA Status: 3          Anesthesia Type: No value filed.     Aleksander Phase I: Aleksander Score: 9    Aleksander Phase II: Aleksander Score: 10  Vitals:    06/28/22 0910 06/28/22 0915 06/28/22 0921 06/28/22 0931   BP: (!) 161/79 (!) 148/75 (!) 151/77 (!) 146/83   Pulse: 67 63 64 68   Resp: 14 16 16 16   Temp:       TempSrc:       SpO2: 98% 96% 95% 94%   Weight:       Height:           Anesthesia Post Evaluation    Patient location during evaluation: bedside  Patient participation: complete - patient participated  Level of consciousness: awake and alert  Airway patency: patent  Nausea & Vomiting: no nausea  Complications: no  Cardiovascular status: hemodynamically stable  Respiratory status: acceptable  Hydration status: euvolemic

## 2022-06-28 NOTE — PROCEDURES
PROCEDURE: Robotic bronchoscopy-guided transbronchial needle aspiration, transbronchial biopsies and BAL. DESCRIPTION OF PROCEDURE: Informed consent was obtained from the patient after explaining the risks and benefits. A time out was taken. Anesthesia was kindly provided by the anesthesia team.     The bronchoscope was passed with ease via size 8.5 ET tube. All airways were well visualized. There was no endobronchial lesion. There were thick viscous secretions in the airways. All airway secretions were removed by suctioning prior to robotic assisted portion of procedure. The [place] robotic bronchoscope was next inserted into the trachea using standard procedure. The robot was guided along the designed pathway from pre-procedural CT. Once the target lesion in the superior segment of the right lower lobe (apical lesion) was reached, the presence of the peripheral nodule to be biopsied was confirmed using radial probe EBUS. A small concentric lesion/portion of the mass was visualized with radial probe EBUS. While utilizing live fluoroscopic imaging, the following samples were obtained from the target lesion:  · Transbronchial needle aspiration - samples taken from two separate areas of the cavitary nodule, including the area of greatest PET positivity. · Transbronchial biopsies (sent for both culture and pathology)    A BAL was obtained from the right apical portion of the lung (superior segment of the right upper lobe). The patient tolerated the procedure well. Estimated blood loss was less than 5 ml. Recovery will be per the anesthesia team.      BETZY (Rapid On-Site Examination) was performed by pathologist.  Acute inflammation was seen on multiple samples. FOLLOW UP:  is with Dr. Tami Ledezma. Patient is instructed to call with concerns and if follow up has not already been scheduled.   In the event of severe symptoms or if the patient is unable to reach my office, instructions are given to proceed to the emergency department.

## 2022-06-28 NOTE — PROGRESS NOTES
Discharge instructions given to patient and wife, voiced understanding questions encouraged.   Chest xray obtained

## 2022-06-28 NOTE — ANESTHESIA PRE PROCEDURE
Department of Anesthesiology  Preprocedure Note       Name:  Dayton Obrien   Age:  77 y.o.  :  1945                                          MRN:  [id number]         Date:  2022      Surgeon: Sherri Rodas):  Kayleen Hardy MD    Procedure: Procedure(s): MONARCH ROBOT (7:00) NO LABS    Medications prior to admission:   Prior to Admission medications    Medication Sig Start Date End Date Taking? Authorizing Provider   albuterol sulfate HFA (PROAIR HFA) 108 (90 Base) MCG/ACT inhaler Inhale 2 puffs into the lungs every 6 hours as needed for Wheezing or Shortness of Breath 22   Karlene Miller MD   pravastatin (PRAVACHOL) 40 MG tablet Take 1 tablet by mouth daily 17   Historical Provider, MD   losartan (COZAAR) 100 MG tablet Take 100 mg by mouth daily. Historical Provider, MD   hydrochlorothiazide (HYDRODIURIL) 25 MG tablet Take 25 mg by mouth daily Takes 0.5 tab daily    Historical Provider, MD   aspirin 325 MG tablet Take 325 mg by mouth daily. Historical Provider, MD       Current medications:    Current Facility-Administered Medications   Medication Dose Route Frequency Provider Last Rate Last Admin    lactated ringers infusion   IntraVENous Continuous Kayleen Hardy MD        famotidine (PEPCID) injection 20 mg  20 mg IntraVENous Once Carolann Barajas MD        lactated ringers infusion   IntraVENous Continuous Carolann Barajas MD        sodium chloride flush 0.9 % injection 5-40 mL  5-40 mL IntraVENous 2 times per day Carolann Barajas MD        sodium chloride flush 0.9 % injection 5-40 mL  5-40 mL IntraVENous PRN Carolann Barajas MD        0.9 % sodium chloride infusion   IntraVENous PRN Carolann Barajas MD           Allergies:     Allergies   Allergen Reactions    Ace Inhibitors Other (See Comments)       Problem List:    Patient Active Problem List   Diagnosis Code    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 ([facility] [address] C34.11    Cavitary lung disease J98.4       Past Medical History:        Diagnosis Date    Acid fast bacillus 09/21/2016    sputum - QuantiFERON-TB Gold is negative - DNA probe negative for TB    Aneurysm ([facility] 2010    brain    Cancer ([facility] 1999    lung    Cavitary lesion of lung     Hyperlipidemia     Hypertension     Pneumonia     Pulmonary nodules        Past Surgical History:        Procedure Laterality Date    BRONCHOSCOPY  09/21/2016    COLONOSCOPY  2012    FINGER SURGERY  1964    right ring finger    HERNIA REPAIR  [birth year]    LUNG CANCER SURGERY  1999    lobe removed right    NOSE SURGERY  1964    deviated septum    OTHER SURGICAL HISTORY  2010    cerebral aneurysm wire       Social History:    Social History     Tobacco Use    Smoking status: Former Smoker     Packs/day: 1.50     Years: 50.00     Pack years: 75.00     Types: Cigarettes     Quit date: 10/29/2011     Years since quitting: 10.6    Smokeless tobacco: Never Used   Substance Use Topics    Alcohol use: Yes     Alcohol/week: 0.0 standard drinks     Comment: social                                Counseling given: Not Answered      Vital Signs (Current):   Vitals:    06/23/22 1613   Weight: 162 lb (73.5 kg)   Height: 5' 11\" (1.803 m)                                              BP Readings from Last 3 Encounters:   06/21/22 (!) 144/62   03/19/20 132/84   03/14/19 122/62       NPO Status:  mn+, see mar                                                                               BMI:   Wt Readings from Last 3 Encounters:   06/23/22 162 lb (73.5 kg)   06/21/22 163 lb (73.9 kg)   04/28/20 165 lb (74.8 kg)     Body mass index is 22.59 kg/m².     CBC:   Lab Results   Component Value Date    WBC 9.5 08/05/2016    RBC 4.32 08/05/2016    HGB 13.9 08/05/2016    HCT 41.3 08/05/2016    MCV 95.5 08/05/2016    RDW 13.6 08/05/2016     09/21/2016       CMP:   Lab Results   Component Value Date     08/05/2016    K 3.9 08/05/2016     08/05/2016    CO2 27 08/05/2016    BUN 17 08/05/2016    CREATININE 1.1 08/05/2016    LABGLOM >60.0 08/05/2016    GLUCOSE 88 08/05/2016    PROT 6.7 08/05/2016    CALCIUM 9.0 08/05/2016    BILITOT 0.7 08/05/2016    ALKPHOS 29 08/05/2016    AST 29 08/05/2016    ALT 21 08/05/2016       POC Tests: No results for input(s): POCGLU, POCNA, POCK, POCCL, POCBUN, POCHEMO, POCHCT in the last 72 hours. Coags:   Lab Results   Component Value Date    PROTIME 11.7 09/21/2016    INR 1.03 09/21/2016       HCG (If Applicable): No results found for: PREGTESTUR, PREGSERUM, HCG, HCGQUANT     ABGs: No results found for: PHART, PO2ART, DTJ4DCW, FBO9KPG, BEART, B5EKAUGL     Type & Screen (If Applicable):  No results found for: LABABO, LABRH    Drug/Infectious Status (If Applicable):  No results found for: HIV, HEPCAB    COVID-19 Screening (If Applicable): No results found for: COVID19        Anesthesia Evaluation  Patient summary reviewed no history of anesthetic complications:   Airway: Mallampati: II  TM distance: <3 FB   Neck ROM: full  Mouth opening: > = 3 FB   Dental:          Pulmonary: breath sounds clear to auscultation  (+) COPD (no o2 req., pr[facility]      (-) asthma, shortness of breath, recent URI, sleep apnea and not a current smoker          Patient did not smoke on day of surgery. Cardiovascular:    (+) hypertension:, hyperlipidemia    (-) pacemaker, past MI, CAD, CABG/stent, dysrhythmias,  angina and  CHF    ECG reviewed  Rhythm: regular  Rate: normal           Beta Blocker:  Not on Beta Blocker         Neuro/Psych:   (+) CVA:,    (-) seizures, TIA and psychiatric history           GI/Hepatic/Renal:   (+) GERD (otc meds prn): well controlled,      (-) liver disease, no renal disease, bowel prep and no morbid obesity       Endo/Other:    (+) blood dyscrasia (asa, off 5d): arthritis:., malignancy/cancer (rul, resection, no rads or chemo).     (-) diabetes mellitus, hypothyroidism, hyperthyroidism               Abdominal:       Abdomen: soft. Vascular:   + PVD, aortic or cerebral (cerebral aneurism rupture, 10 yrs ago, coiled), . Other Findings:           Anesthesia Plan      general     ASA 3       Induction: intravenous. MIPS: Postoperative opioids intended and Prophylactic antiemetics administered. Anesthetic plan and risks discussed with spouse and patient. Plan discussed with CRNA. This pre-anesthesia assessment may be used as a history and physical.    DOS STAFF ADDENDUM:    Pt seen and examined, chart reviewed (including anesthesia, drug and allergy history). No interval changes to history and physical examination. Anesthetic plan, risks, benefits, alternatives, and personnel involved discussed with patient. Patient verbalized an understanding and agrees to proceed.       Chani Willoughby MD  June 28, 2022  6:40 AM      Chani Willoughby MD   6/28/2022

## 2022-06-30 LAB
CULTURE, RESPIRATORY: NORMAL
GRAM STAIN RESULT: NORMAL

## 2022-07-05 NOTE — TELEPHONE ENCOUNTER
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

## 2022-07-05 NOTE — TELEPHONE ENCOUNTER
Pt called for bronch results. Per result note from Dr. Audi Trujillo - he said to schedule apt this week to discuss. Pt is in [facility] until the 1st week of August and therefore cannot do in office or [facility] to review results. Notified Dr. Audi Trujillo via result note - awaiting his response on how to proceed.

## 2022-07-15 NOTE — TELEPHONE ENCOUNTER
AFB from bronch washing-ID still pending. AFB from BAL:  Culture with Smear, Acid Fast Bacillius  Order: [id number]  Status: Preliminary result    Visible to patient: No (not released)    Next appt: None    Specimen Information: BAL- Bronch.  Lavage        0 Result Notes    Component 6/28/22 0848    AFB Smear No AFB observed by Fluorescent stain    Organism Mycobacterium abscessus complex Abnormal  P    AFB Culture (Mycobacteria) POSITIVE for   When growth is sufficient, organism will be   sent out for confirmatory Identification   Sent to Reference Lab for ID and sensitivity   Sensitivity to follow

## 2022-07-18 LAB
Lab: NORMAL
REPORT: NORMAL
THIS TEST SENT TO: NORMAL

## 2022-07-19 NOTE — TELEPHONE ENCOUNTER
Referral to [facility] ID placed for pt to be scheduled ASAP. Will make sure pt gets scheduled.

## 2022-07-26 LAB
AFB CULTURE (MYCOBACTERIA): ABNORMAL
AFB SMEAR: ABNORMAL
Lab: NORMAL
ORGANISM: ABNORMAL
REPORT: NORMAL
THIS TEST SENT TO: NORMAL

## 2022-07-28 ENCOUNTER — TELEPHONE (OUTPATIENT)
Dept: INFECTIOUS DISEASES | Age: 77
End: 2022-07-28

## 2022-07-28 NOTE — TELEPHONE ENCOUNTER
Left message requesting a return call to schedule NP appt with Dr. Julissa Ghotra per Becki Escobar.

## 2022-07-29 ENCOUNTER — TELEPHONE (OUTPATIENT)
Dept: PULMONOLOGY | Age: 77
End: 2022-07-29

## 2022-07-29 NOTE — TELEPHONE ENCOUNTER
Lucie Jovel MD 31 minutes ago (2:11 PM)         New result: Avium  intracellulare complex. So do I have both or the newest result? Curiouser and curiouser. Hey, doc, just have a few concerns here. 1. Cant get in to infectious disease physician until August 30th. Seems like my horrible disease shouldnt have been given two extra months to eat my lung (6/28 to 8/30)  2. Unless I have read it incorrectly, the infection has been identified as the above complex. Upon an iPad search, I find sub variants and some treatments and some not good news about some of them. A. Has my infection been specifically identified? B. My appointment on the 30th is probably just a get to know you visit. Should we not begin treatment? C. Didnt I see an infectious disease physician for xenopi? Why a different one now? Is it just availability? 3. In preparation for the xenopi antibiotics, I believe you had me see a cardiologist. I could be wrong, but if you could check to see - one of my test results indicated something abnormal about my EKG-.   Thanks in advance  Mert Masterson

## 2022-08-01 LAB
FUNGUS (MYCOLOGY) CULTURE: NORMAL
FUNGUS STAIN: NORMAL

## 2022-08-01 NOTE — TELEPHONE ENCOUNTER
Dr. Nallely Joshua called, he spoke with pt. Dr. Nallely Joshua also spoke with Dr. Sunil Borrego whom is going to have his staff call pt to schedule an appt for pt this week. Pt was informed. Will watch to make sure pt gets scheduled.

## 2022-08-01 NOTE — TELEPHONE ENCOUNTER
I called patient and discussed. Multiple good questions were answered.    Took a while for the culture to get finalized  Spoke with Aidee Lundberg at [address] and able to see patient this week  Patient is aware

## 2022-08-09 ENCOUNTER — OFFICE VISIT (OUTPATIENT)
Dept: INFECTIOUS DISEASES | Age: 77
End: 2022-08-09
Payer: MEDICARE

## 2022-08-09 VITALS
HEIGHT: 71 IN | BODY MASS INDEX: 22.89 KG/M2 | HEART RATE: 65 BPM | DIASTOLIC BLOOD PRESSURE: 79 MMHG | WEIGHT: 163.5 LBS | SYSTOLIC BLOOD PRESSURE: 154 MMHG

## 2022-08-09 DIAGNOSIS — A31.0 MAI (MYCOBACTERIUM AVIUM-INTRACELLULARE) INFECTION (HCC): ICD-10-CM

## 2022-08-09 DIAGNOSIS — Z85.118 HISTORY OF LUNG CANCER: ICD-10-CM

## 2022-08-09 DIAGNOSIS — Z71.89 ENCOUNTER FOR MEDICATION COUNSELING: ICD-10-CM

## 2022-08-09 DIAGNOSIS — R91.1 PULMONARY NODULE: ICD-10-CM

## 2022-08-09 DIAGNOSIS — Z90.2 HISTORY OF LOBECTOMY OF LUNG: ICD-10-CM

## 2022-08-09 DIAGNOSIS — Z51.81 THERAPEUTIC DRUG MONITORING: Primary | ICD-10-CM

## 2022-08-09 DIAGNOSIS — J98.4 CAVITARY LUNG DISEASE: ICD-10-CM

## 2022-08-09 DIAGNOSIS — A31.8 MYCOBACTERIUM ABSCESSUS INFECTION: ICD-10-CM

## 2022-08-09 PROCEDURE — 1123F ACP DISCUSS/DSCN MKR DOCD: CPT | Performed by: INTERNAL MEDICINE

## 2022-08-09 PROCEDURE — 99205 OFFICE O/P NEW HI 60 MIN: CPT | Performed by: INTERNAL MEDICINE

## 2022-08-09 RX ORDER — ETHAMBUTOL HYDROCHLORIDE 400 MG/1
1200 TABLET, FILM COATED ORAL DAILY
Qty: 90 TABLET | Refills: 2 | Status: SHIPPED | OUTPATIENT
Start: 2022-08-09 | End: 2022-11-07

## 2022-08-09 RX ORDER — CLARITHROMYCIN 500 MG/1
500 TABLET, COATED ORAL 2 TIMES DAILY
Qty: 60 TABLET | Refills: 2 | Status: SHIPPED | OUTPATIENT
Start: 2022-08-09 | End: 2022-11-07

## 2022-08-09 ASSESSMENT — ENCOUNTER SYMPTOMS
SORE THROAT: 0
SHORTNESS OF BREATH: 0
BACK PAIN: 0
EYE DISCHARGE: 0
NAUSEA: 0
DIARRHEA: 0
SINUS PAIN: 0
EYE REDNESS: 0
ABDOMINAL PAIN: 0
CONSTIPATION: 0
WHEEZING: 0
RHINORRHEA: 0
SINUS PRESSURE: 0
COUGH: 1

## 2022-08-09 NOTE — PROGRESS NOTES
Infectious Diseases Clinic NewPatient Note    Reason for Visit:                Concern for Mycobacterium abscessus infection  Primary Care Physician:  Rubén Moy DO  History Obtained From:   Patient , Medical Records     CHIEF COMPLAINT:    Chief Complaint   Patient presents with    New Patient     Mycobacterium Abscessus Colonization -nk       HISTORY OF PRESENT ILLNESS: Delfin Acuna is a 69 y.o. pleasant male patient, who had a physical visit with me today as a new patient. History was obtained from chart review and the patient. The patient had a clinic visit with me today for above mentioned complaints. 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 Carmen Pelaez oral in 2018. The patient is seeing Dr. Shonda Horn, pulmonologist at [facility] and had a bronchoscopy done with bronchoalveolar lavage and cultures on 6/28/2022. 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 The patient was referred to me and was seen today in the clinic as a new patient    Past Medical History:   Past medical  history wasreviewed by me during this visit in detail. Past Medical History:   Diagnosis Date    Acid fast bacillus 09/21/2016    sputum - QuantiFERON-TB Gold is negative - DNA probe negative for TB    Aneurysm ([facility] [address] 2010    brain    Cancer ([facility] [address] 1999    lung    Cavitary lesion of lung     Hyperlipidemia     Hypertension     Pneumonia     Pulmonary nodules        Past Surgical History:  Past surgical history was reviewed by me during this visit in detail.      Past Surgical History:   Procedure Laterality Date    BRONCHOSCOPY  09/21/2016    BRONCHOSCOPY N/A 6/28/2022    MONARCH ROBOT (7:00) NO LABS performed by Preston Lopez MD at [facility]  6/28/2022    BRONCHOSCOPY performed by Connor Mcdaniel Stefan Mars MD at [address] [place] [address]  6/28/2022    BRONCHOSCOPY/TRANSBRONCHIAL NEEDLE BIOPSY ROBOTIC performed by Preston Lopez MD at [address] [place] [address]  6/28/2022    BRONCHOSCOPY BIOPSY BRONCHUS ROBOTIC performed by Preston Lopez MD at [address] [place] [address]  6/28/2022    BRONCHOSCOPY ALVEOLAR LAVAGE ROBOTIC performed by Preston Lopez MD at [place] [address]  2012    [address]    right ring finger    [address]    lobe removed right    NOSE SURGERY  1964    deviated septum    OTHER SURGICAL HISTORY  2010    cerebral aneurysm wire       Current Medications: All medicationswere reviewed by me during this visit  Current Outpatient Medications   Medication Sig Dispense Refill    clarithromycin (BIAXIN) 500 MG tablet Take 1 tablet by mouth in the morning and 1 tablet before bedtime. 60 tablet 2    rifAMPin (RIFADIN) 300 MG capsule Take 1 capsule by mouth in the morning and 1 capsule before bedtime. 60 capsule 2    ethambutol (MYAMBUTOL) 400 MG tablet Take 3 tablets by mouth in the morning. 90 tablet 2    albuterol sulfate HFA (PROAIR HFA) 108 (90 Base) MCG/ACT inhaler Inhale 2 puffs into the lungs every 6 hours as needed for Wheezing or Shortness of Breath 1 each 2    pravastatin (PRAVACHOL) 40 MG tablet Take 1 tablet by mouth daily  1    losartan (COZAAR) 100 MG tablet Take 100 mg by mouth daily. hydrochlorothiazide (HYDRODIURIL) 25 MG tablet Take 25 mg by mouth daily Takes 0.5 tab daily      aspirin 325 MG tablet Take 325 mg by mouth daily. No current facility-administered medications for this visit. Family history:  All family history was reviewed by me today    Family History   Problem Relation Age of Onset    Hypertension Mother     Hypertension Father     Asthma Neg Hx     Cancer Neg Hx     Diabetes Neg Hx     Heart Failure Neg Hx     Emphysema Neg Hx        No family history of immunocompromising or autoimmune conditions. No h/o TBin in family or contacts      REVIEW OF SYSTEMS:      Review of Systems   Constitutional:  Positive for fatigue. Negative for chills, diaphoresis and fever. HENT:  Negative for ear discharge, ear pain, postnasal drip, rhinorrhea, sinus pressure, sinus pain and sore throat. Eyes:  Negative for discharge and redness. Respiratory:  Positive for cough. Negative for shortness of breath and wheezing. Produces clear sputum, about quarter of a cup every day   Cardiovascular:  Negative for chest pain and leg swelling. Gastrointestinal:  Negative for abdominal pain, constipation, diarrhea and nausea. Endocrine: Negative for cold intolerance, heat intolerance and polydipsia. Genitourinary:  Negative for dysuria, flank pain, frequency, hematuria and urgency. Musculoskeletal:  Negative for back pain and myalgias. Skin:  Negative for rash. Allergic/Immunologic: Negative for immunocompromised state. Neurological:  Negative for dizziness, seizures and headaches. Hematological:  Does not bruise/bleed easily. Psychiatric/Behavioral:  Negative for agitation, hallucinations and suicidal ideas. The patient is not nervous/anxious. All other systems reviewed and are negative. PHYSICAL EXAM:      Vitals:    08/09/22 1032   BP: (!) 154/79   Pulse: 65       Physical Exam  Vitals and nursing note reviewed. Constitutional:       Appearance: He is well-developed. He is not diaphoretic. Comments: The patient was seen earlier today. HENT:      Head: Normocephalic and atraumatic. Right Ear: External ear normal. There is no impacted cerumen. Left Ear: External ear normal. There is no impacted cerumen. Nose: Nose normal.      Mouth/Throat:      Mouth: Mucous membranes are moist.      Pharynx: Oropharynx is clear. No oropharyngeal exudate. Eyes:      General: No scleral icterus. Right eye: No discharge.          Left eye: No discharge. Conjunctiva/sclera: Conjunctivae normal.      Pupils: Pupils are equal, round, and reactive to light. Neck:      Thyroid: No thyromegaly. Cardiovascular:      Rate and Rhythm: Normal rate and regular rhythm. Heart sounds: Normal heart sounds. No murmur heard. No friction rub. Pulmonary:      Effort: No respiratory distress. Breath sounds: No stridor. Rales (Occasional) present. No wheezing. Abdominal:      General: Bowel sounds are normal.      Palpations: Abdomen is soft. Tenderness: no abdominal tenderness There is no guarding or rebound. Musculoskeletal:         General: No swelling, tenderness or deformity. Normal range of motion. Cervical back: Normal range of motion and neck supple. Right lower leg: No edema. Left lower leg: No edema. Lymphadenopathy:      Cervical: No cervical adenopathy. Skin:     General: Skin is warm and dry. Coloration: Skin is not jaundiced. Findings: No bruising, erythema or rash. Neurological:      General: No focal deficit present. Mental Status: He is alert and oriented to person, place, and time. Mental status is at baseline. Motor: No abnormal muscle tone.    Psychiatric:         Mood and Affect: Mood normal.         Behavior: Behavior normal.            DATA:  All available lab data was reviewed by me during this visit    Last CBC:  Lab Results   Component Value Date    WBC 13.5 (H) 06/28/2022    HGB 13.9 06/28/2022    HCT 39.8 (L) 06/28/2022    MCV 90.3 06/28/2022     06/28/2022    LABLYMP 2.4 08/05/2016    MID 0.6 08/05/2016    GRAN 6.6 08/05/2016    LYMPHOPCT 24.8 08/05/2016    MIDPERCENT 5.9 08/05/2016    GRANULOCYTES 69.3 08/05/2016    RBC 4.40 06/28/2022    MCH 31.7 06/28/2022    MCHC 35.1 06/28/2022    RDW 13.5 06/28/2022       Last BMP:  Lab Results   Component Value Date/Time     06/28/2022 06:30 AM    K 4.1 06/28/2022 06:30 AM     06/28/2022 06:30 AM    CO2 30 10/07/2011, 11/03/2013    Influenza, High Dose (Fluzone 65 yrs and older) 09/28/2015, 11/04/2016, 11/13/2017, 10/22/2018, 11/02/2019    Influenza, [place], IM, (6 mo and older Fluzone, Flulaval, Fluarix and 3 yrs and older Afluria) 09/11/2009, 10/07/2011, 11/03/2013    PPD Test 09/23/2016    Pneumococcal Conjugate 13-valent ([id number]) 05/22/2015    Pneumococcal Polysaccharide ([id number]) 11/15/2011    Td, unspecified formulation 06/21/2012    Zoster Live (Zostavax) 02/23/2011    Zoster Vaccine 02/23/2011       Social History:  Allsocial and epidemiologic history was reviewed and updated be me in detail today. Social History     Socioeconomic History    Marital status:    Tobacco Use    Smoking status: Former     Packs/day: 1.50     Years: 50.00     Pack years: 75.00     Types: Cigarettes     Quit date: 10/29/2011     Years since quitting: 10.7    Smokeless tobacco: Never   Vaping Use    Vaping Use: Never used   Substance and Sexual Activity    Alcohol use: Yes     Alcohol/week: 0.0 standard drinks     Comment: social    Drug use: No    Sexual activity: Never     Partners: Female       COVID VACCINATION AND LAB RESULT RECORDS:     Internal Administration   First Dose COVID-19, PFIZER PURPLE top, DILUTE for use, (age 15 y+), 30mcg/0.3mL  02/25/2021   Second Dose COVID-19, PFIZER PURPLE top, DILUTE for use, (age 15 y+), 30mcg/0.3mL   03/18/2021       Last COVID Lab No results found for: SARS-COV-2, SARS-COV-2 RNA, SARS-COV-2, SARS-COV-2, SARS-COV-2 BY PCR, SARS-COV-2, SARS-COV-2, SARS-COV-2         IMPRESSION:    The patient is a 69 y. o.old male who  has a past medical history of Acid fast bacillus (09/21/2016), Aneurysm ([address] (2010), Cancer ([facility] [address] (1999), Cavitary lesion of lung, Hyperlipidemia, Hypertension, Pneumonia, and Pulmonary nodules. was seen today for following problems:    1. Therapeutic drug monitoring    2. Cavitary lung disease    3.  SOPHIE (mycobacterium avium-intracellulare) infection ([facility] taking multiple antibiotics every day    I also reviewed the sensitivities on Mycobacterium avium and Mycobacterium abscessus from epic. Both Mycobacterium avium and Mycobacterium abscesses were sensitive to linezolid and clarithromycin    RECOMMENDATIONS:      Diagnostic Workup:    Recommend a baseline LFT  Plan for a CBC and CMP in 3 months  Continue to follow fever curve at home. Antimicrobials: Both Mycobacterium avium complex and Mycobacterium abscessus will be covered with macrolides. I will order oral clarithromycin 500 mg twice daily for the patient  The patient has tolerated oral rifampin and ethambutol well in the past as well  Will order p.o. rifampin 300 mg twice daily for the patient  Will order oral ethambutol 1200 mg daily for the patient  Due to potential of linezolid to cause thrombocytopenia with long-term use, will not be starting it at this time. If the patient starts to fail on above antibiotics, may consider adding once a day linezolid  Advised patient to start taking oral probiotic twice daily  Continue to watch for any new fever or shortness of breath or other worsening respiratory symptoms  Cough and deep breathing exercises  Discussed with patient the strategies to stay safe from COVID 19 exposures including safe social distancing, frequent and proper hand hygiene when outside and using 3 layered mouth and nose coverings/facemasks when outside their home.       Labs ordered today inEPIC:    Orders Placed This Encounter   Procedures    CT CHEST WO CONTRAST     Standing Status:   Future     Standing Expiration Date:   8/9/2023    Hepatic Function Panel     Standing Status:   Future     Standing Expiration Date:   8/9/2023    CBC with Auto Differential     Standing Status:   Future     Standing Expiration Date:   8/9/2023    Comprehensive Metabolic Panel     Standing Status:   Future     Standing Expiration Date:   8/9/2023       Medications ordered today in EPIC:    Orders Placed This Encounter   Medications    clarithromycin (BIAXIN) 500 MG tablet     Sig: Take 1 tablet by mouth in the morning and 1 tablet before bedtime. Dispense:  60 tablet     Refill:  2    rifAMPin (RIFADIN) 300 MG capsule     Sig: Take 1 capsule by mouth in the morning and 1 capsule before bedtime. Dispense:  60 capsule     Refill:  2    ethambutol (MYAMBUTOL) 400 MG tablet     Sig: Take 3 tablets by mouth in the morning. Dispense:  90 tablet     Refill:  2       Drug Monitoring:    Monitor for antibiotic toxicity as follows: CBC, CMP in 3 months    Fax above results to [phone]. Patient education and counseling: The patient was educated in detail about the side-effects of variousantibiotics and things to watch for like new rashes, lip swelling, severe reaction, worsening diarrhea, break through fever etc.  Patient was instructed to stop antibiotics and callour office if these problems were to occur, or go to nearest ER if experiencing severe symptoms. Discussed patient's condition and what to expect. All of the patient's questions wereaddressed in a satisfactory manner and patient verbalized understanding all instructions. Follow-up:    Follow-up with me in ID clinic or through video visit in 3 months    An electronic signature was used to authenticate this note. TIME SPENTTODAY:     - Spent over 65 minutes on visit (including interval history, physical exam, review of data including labs, cultures, imaging, development and implementation of treatmentplan and coordination of care). - Over 50% of time spent with pt counseling and education. Please note that this chart was generated using Dragon dictation software. Although every effort was made to ensure the accuracy of this automated transcription, some errors in transcription may have occurred inadvertently.  If you may need any clarification, please do not hesitate to contact me through EPIC or at the phone number provided below with my electronic signature. Any pictures or media included in this note were obtained after taking informed verbal consent from the patient and with their approval to include those in the patient's medical record. Thankyou for involving me in the care of your patient. If you have any additional questions,please do not hesitate to contact me.       Alberta Morrison MD, MPH, [address]  8/9/2022, 10:03 PM  [facility] Infectious Disease   [address] [place] [address], [address], [address] [place] [address]  Office: [phone]  Fax: [phone]  Clinic days:  Tuesday & Thursday

## 2022-08-16 LAB
AFB CULTURE (MYCOBACTERIA): NORMAL
AFB SMEAR: NORMAL

## 2022-08-22 ENCOUNTER — TELEPHONE (OUTPATIENT)
Dept: INFECTIOUS DISEASES | Age: 77
End: 2022-08-22

## 2022-11-04 ENCOUNTER — HOSPITAL ENCOUNTER (OUTPATIENT)
Age: 77
Discharge: HOME | End: 2022-11-04
Payer: MEDICARE

## 2022-11-04 DIAGNOSIS — Z51.81 THERAPEUTIC DRUG MONITORING: ICD-10-CM

## 2022-11-04 LAB
A/G RATIO: 2.4 (ref 1.1–2.2)
ALBUMIN SERPL-MCNC: 4.5 G/DL (ref 3.4–5)
ALP BLD-CCNC: 51 U/L (ref 40–129)
ALT SERPL-CCNC: 20 U/L (ref 10–40)
ANION GAP SERPL CALCULATED.3IONS-SCNC: 10 MMOL/L (ref 3–16)
AST SERPL-CCNC: 20 U/L (ref 15–37)
BASOPHILS ABSOLUTE: 0 K/UL (ref 0–0.2)
BASOPHILS RELATIVE PERCENT: 0.3 %
BILIRUB SERPL-MCNC: 0.4 MG/DL (ref 0–1)
BILIRUBIN DIRECT: <0.2 MG/DL (ref 0–0.3)
BILIRUBIN, INDIRECT: NORMAL MG/DL (ref 0–1)
BUN BLDV-MCNC: 17 MG/DL (ref 7–20)
CALCIUM SERPL-MCNC: 9 MG/DL (ref 8.3–10.6)
CHLORIDE BLD-SCNC: 103 MMOL/L (ref 99–110)
CO2: 30 MMOL/L (ref 21–32)
CREAT SERPL-MCNC: 1.1 MG/DL (ref 0.8–1.3)
EOSINOPHILS ABSOLUTE: 0.3 K/UL (ref 0–0.6)
EOSINOPHILS RELATIVE PERCENT: 2.9 %
GFR SERPL CREATININE-BSD FRML MDRD: >60 ML/MIN/{1.73_M2}
GLUCOSE BLD-MCNC: 117 MG/DL (ref 70–99)
HCT VFR BLD CALC: 43.2 % (ref 40.5–52.5)
HEMOGLOBIN: 15.3 G/DL (ref 13.5–17.5)
LYMPHOCYTES ABSOLUTE: 3.4 K/UL (ref 1–5.1)
LYMPHOCYTES RELATIVE PERCENT: 34.3 %
MCH RBC QN AUTO: 32.6 PG (ref 26–34)
MCHC RBC AUTO-ENTMCNC: 35.5 G/DL (ref 31–36)
MCV RBC AUTO: 91.9 FL (ref 80–100)
MONOCYTES ABSOLUTE: 0.6 K/UL (ref 0–1.3)
MONOCYTES RELATIVE PERCENT: 6.3 %
NEUTROPHILS ABSOLUTE: 5.6 K/UL (ref 1.7–7.7)
NEUTROPHILS RELATIVE PERCENT: 56.2 %
PDW BLD-RTO: 14 % (ref 12.4–15.4)
PLATELET # BLD: 206 K/UL (ref 135–450)
PMV BLD AUTO: 7.7 FL (ref 5–10.5)
POTASSIUM SERPL-SCNC: 4.1 MMOL/L (ref 3.5–5.1)
RBC # BLD: 4.7 M/UL (ref 4.2–5.9)
SODIUM BLD-SCNC: 143 MMOL/L (ref 136–145)
TOTAL PROTEIN: 6.4 G/DL (ref 6.4–8.2)
WBC # BLD: 9.9 K/UL (ref 4–11)

## 2022-11-04 PROCEDURE — 36415 COLL VENOUS BLD VENIPUNCTURE: CPT

## 2022-11-04 PROCEDURE — 85025 COMPLETE CBC W/AUTO DIFF WBC: CPT

## 2022-11-04 PROCEDURE — 80053 COMPREHEN METABOLIC PANEL: CPT

## 2022-11-04 PROCEDURE — 82248 BILIRUBIN DIRECT: CPT

## 2022-11-07 ENCOUNTER — HOSPITAL ENCOUNTER (OUTPATIENT)
Dept: CT IMAGING | Age: 77
Discharge: HOME | End: 2022-11-07
Payer: MEDICARE

## 2022-11-07 DIAGNOSIS — A31.0 MAI (MYCOBACTERIUM AVIUM-INTRACELLULARE) INFECTION (HCC): ICD-10-CM

## 2022-11-07 PROCEDURE — 71250 CT THORAX DX C-: CPT

## 2022-11-08 ENCOUNTER — OFFICE VISIT (OUTPATIENT)
Dept: INFECTIOUS DISEASES | Age: 77
End: 2022-11-08
Payer: MEDICARE

## 2022-11-08 VITALS
WEIGHT: 160.7 LBS | HEART RATE: 72 BPM | DIASTOLIC BLOOD PRESSURE: 78 MMHG | BODY MASS INDEX: 21.77 KG/M2 | OXYGEN SATURATION: 93 % | SYSTOLIC BLOOD PRESSURE: 158 MMHG | HEIGHT: 72 IN | TEMPERATURE: 97.9 F

## 2022-11-08 DIAGNOSIS — A31.0 MAI (MYCOBACTERIUM AVIUM-INTRACELLULARE) INFECTION (HCC): Primary | ICD-10-CM

## 2022-11-08 DIAGNOSIS — A31.8 MYCOBACTERIUM ABSCESSUS INFECTION: ICD-10-CM

## 2022-11-08 DIAGNOSIS — Z51.81 THERAPEUTIC DRUG MONITORING: ICD-10-CM

## 2022-11-08 DIAGNOSIS — Z23 NEED FOR 23-POLYVALENT PNEUMOCOCCAL POLYSACCHARIDE VACCINE: ICD-10-CM

## 2022-11-08 DIAGNOSIS — R91.1 PULMONARY NODULE: ICD-10-CM

## 2022-11-08 DIAGNOSIS — Z90.2 HISTORY OF LOBECTOMY OF LUNG: ICD-10-CM

## 2022-11-08 DIAGNOSIS — J98.4 CAVITARY LUNG DISEASE: ICD-10-CM

## 2022-11-08 DIAGNOSIS — Z85.118 HISTORY OF LUNG CANCER: ICD-10-CM

## 2022-11-08 DIAGNOSIS — Z71.89 ENCOUNTER FOR MEDICATION COUNSELING: ICD-10-CM

## 2022-11-08 PROCEDURE — 1123F ACP DISCUSS/DSCN MKR DOCD: CPT | Performed by: INTERNAL MEDICINE

## 2022-11-08 PROCEDURE — 99214 OFFICE O/P EST MOD 30 MIN: CPT | Performed by: INTERNAL MEDICINE

## 2022-11-08 RX ORDER — ETHAMBUTOL HYDROCHLORIDE 400 MG/1
1200 TABLET, FILM COATED ORAL DAILY
Qty: 90 TABLET | Refills: 3 | Status: SHIPPED | OUTPATIENT
Start: 2022-11-08 | End: 2023-03-08

## 2022-11-08 RX ORDER — CLARITHROMYCIN 500 MG/1
500 TABLET, COATED ORAL 2 TIMES DAILY
Qty: 60 TABLET | Refills: 3 | Status: SHIPPED | OUTPATIENT
Start: 2022-11-08 | End: 2023-03-08

## 2022-11-08 RX ORDER — CLARITHROMYCIN 500 MG/1
500 TABLET, COATED ORAL 2 TIMES DAILY
Qty: 60 TABLET | Refills: 2 | OUTPATIENT
Start: 2022-11-08 | End: 2023-02-06

## 2022-11-08 RX ORDER — ETHAMBUTOL HYDROCHLORIDE 400 MG/1
1200 TABLET, FILM COATED ORAL DAILY
Qty: 90 TABLET | Refills: 2 | OUTPATIENT
Start: 2022-11-08 | End: 2023-02-06

## 2022-11-08 ASSESSMENT — ENCOUNTER SYMPTOMS
ABDOMINAL PAIN: 0
SINUS PAIN: 0
EYE DISCHARGE: 0
EYE REDNESS: 0
DIARRHEA: 0
RHINORRHEA: 0
CONSTIPATION: 0
SORE THROAT: 0
BACK PAIN: 0
WHEEZING: 0
NAUSEA: 0
SHORTNESS OF BREATH: 1
SINUS PRESSURE: 0
COUGH: 1

## 2022-11-08 NOTE — PROGRESS NOTES
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 pleasant male patient, who had an outpatient visit with me today for follow-up. History was obtained from chart review and the patient. The patient had a in-person clinic visit with me today for above mentioned complaints. The patient has a history of Mycobacterium as an OP infection that was diagnosed in July 2017 and patient was treated with clarithromycin, rifampin and ethambutol by Dr. Davon Lino in 2018. He is seeing Dr. 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 The patient was referred to me and establish care with me as a new patient on 8/9/2022. I reviewed the sensitivities of both bacteria from epic and started the patient on clarithromycin, rifampin, ethambutol. The patient had a follow-up visit with me in the clinic today      Past Medical History:   Past medical and surgical history was reviewed by me during this visit in detail.     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    Cancer ([facility] [address] 1999    lung    Cavitary lesion of lung     Hyperlipidemia     Hypertension     Pneumonia     Pulmonary nodules        Past Surgical History:    Past Surgical History:   Procedure Laterality Date    BRONCHOSCOPY  09/21/2016    BRONCHOSCOPY N/A 6/28/2022    [place] ROBOT (7: 00) NO LABS performed by Pamela Sanders MD at [place] [id number]  6/28/2022    BRONCHOSCOPY performed by Pamela Sanders MD at [place] [address]  6/28/2022    BRONCHOSCOPY/TRANSBRONCHIAL NEEDLE BIOPSY ROBOTIC performed by Pamela Sanders MD at [place] [address]  6/28/2022    BRONCHOSCOPY BIOPSY BRONCHUS ROBOTIC performed by Pamela Sanders MD at [place] [address]  6/28/2022    BRONCHOSCOPY ALVEOLAR LAVAGE ROBOTIC performed by Pamela Sanders MD at [place] [address]  2012 [address]    right ring finger    [address]    lobe removed right    NOSE SURGERY  1964    deviated septum    OTHER SURGICAL HISTORY  2010    cerebral aneurysm wire       Current Medications: All medications were reviewed by me during this visit  Current Outpatient Medications   Medication Sig Dispense Refill    rifAMPin (RIFADIN) 300 MG capsule Take 1 capsule by mouth 2 times daily 60 capsule 3    clarithromycin (BIAXIN) 500 MG tablet Take 1 tablet by mouth 2 times daily 60 tablet 3    ethambutol (MYAMBUTOL) 400 MG tablet Take 3 tablets by mouth daily 90 tablet 3    albuterol sulfate HFA (PROAIR HFA) 108 (90 Base) MCG/ACT inhaler Inhale 2 puffs into the lungs every 6 hours as needed for Wheezing or Shortness of Breath 1 each 2    pravastatin (PRAVACHOL) 40 MG tablet Take 1 tablet by mouth daily  1    losartan (COZAAR) 100 MG tablet Take 100 mg by mouth daily. hydrochlorothiazide (HYDRODIURIL) 25 MG tablet Take 25 mg by mouth daily Takes 0.5 tab daily      aspirin 325 MG tablet Take 325 mg by mouth daily. No current facility-administered medications for this visit. Family history:  All family history was reviewed by me today    Family History   Problem Relation Age of Onset    Hypertension Mother     Hypertension Father     Asthma Neg Hx     Cancer Neg Hx     Diabetes Neg Hx     Heart Failure Neg Hx     Emphysema Neg Hx        No family history of immunocompromising or autoimmune conditions. No h/o TB in in family or contacts    REVIEW OF SYSTEMS:      Review of Systems   Constitutional:  Negative for chills, diaphoresis and fever. HENT:  Negative for ear discharge, ear pain, postnasal drip, rhinorrhea, sinus pressure, sinus pain and sore throat. Eyes:  Negative for discharge and redness. Respiratory:  Positive for cough and shortness of breath (Shortness of breath on exertion, gets short winded after climbing up stairs, which is not new for him. He however is still continuing to play golf and is doing okay with that so far). Negative for wheezing. Cardiovascular:  Negative for chest pain and leg swelling. Gastrointestinal:  Negative for abdominal pain, constipation, diarrhea and nausea. Endocrine: Negative for cold intolerance, heat intolerance and polydipsia. Genitourinary:  Negative for dysuria, flank pain, frequency, hematuria and urgency. Musculoskeletal:  Negative for back pain and myalgias. Skin:  Negative for rash. Allergic/Immunologic: Negative for immunocompromised state. Neurological:  Negative for dizziness, seizures and headaches. Hematological:  Does not bruise/bleed easily. Psychiatric/Behavioral:  Negative for agitation, hallucinations and suicidal ideas. The patient is not nervous/anxious. All other systems reviewed and are negative. PHYSICAL EXAM:      Vitals:    11/08/22 1057   BP: (!) 158/78   Pulse: 72   Temp: 97.9 °F (36.6 °C)   SpO2: 93%       Physical Exam  Vitals and nursing note reviewed. Constitutional:       Appearance: He is well-developed. He is not diaphoretic. Comments: The patient was seen earlier today. HENT:      Head: Normocephalic and atraumatic. Right Ear: External ear normal. There is no impacted cerumen. Left Ear: External ear normal. There is no impacted cerumen.       Nose: Nose normal.      Mouth/Throat: Mouth: Mucous membranes are moist.      Pharynx: Oropharynx is clear. No oropharyngeal exudate. Eyes:      General: No scleral icterus. Right eye: No discharge. Left eye: No discharge. Conjunctiva/sclera: Conjunctivae normal.      Pupils: Pupils are equal, round, and reactive to light. Neck:      Thyroid: No thyromegaly. Cardiovascular:      Rate and Rhythm: Normal rate and regular rhythm. Heart sounds: Normal heart sounds. No murmur heard. No friction rub. Pulmonary:      Effort: No respiratory distress. Breath sounds: No stridor. No wheezing or rales. Comments: Some distant breath sounds at left lung base. Absent breath sounds at right apex  Abdominal:      General: Bowel sounds are normal.      Palpations: Abdomen is soft. Tenderness: There is no abdominal tenderness. There is no guarding or rebound. Musculoskeletal:         General: No swelling, tenderness or deformity. Normal range of motion. Cervical back: Normal range of motion and neck supple. Right lower leg: No edema. Left lower leg: No edema. Lymphadenopathy:      Cervical: No cervical adenopathy. Skin:     General: Skin is warm and dry. Coloration: Skin is not jaundiced. Findings: No bruising, erythema or rash. Neurological:      General: No focal deficit present. Mental Status: He is alert and oriented to person, place, and time. Mental status is at baseline. Motor: No abnormal muscle tone.    Psychiatric:         Mood and Affect: Mood normal.         Behavior: Behavior normal.            DATA:  All available lab data wasreviewed by me during this visit    CBC:  Lab Results   Component Value Date    WBC 9.9 11/04/2022    HGB 15.3 11/04/2022    HCT 43.2 11/04/2022    MCV 91.9 11/04/2022     11/04/2022    LABLYMP 2.4 08/05/2016    MID 0.6 08/05/2016    GRAN 6.6 08/05/2016    LYMPHOPCT 34.3 11/04/2022    MIDPERCENT 5.9 08/05/2016    GRANULOCYTES 69.3 08/05/2016    RBC 4.70 11/04/2022    MCH 32.6 11/04/2022    MCHC 35.5 11/04/2022    RDW 14.0 11/04/2022       Last BMP:  Lab Results   Component Value Date/Time     11/04/2022 08:16 AM    K 4.1 11/04/2022 08:16 AM    K 4.1 06/28/2022 06:30 AM     11/04/2022 08:16 AM    CO2 30 11/04/2022 08:16 AM    BUN 17 11/04/2022 08:16 AM    CREATININE 1.1 11/04/2022 08:16 AM    GLUCOSE 117 11/04/2022 08:16 AM    GLUCOSE 88 08/05/2016 03:02 PM    CALCIUM 9.0 11/04/2022 08:16 AM        Hepatic Function Panel:  Lab Results   Component Value Date/Time    ALKPHOS 51 11/04/2022 08:16 AM    ALT 20 11/04/2022 08:16 AM    AST 20 11/04/2022 08:16 AM    PROT 6.4 11/04/2022 08:16 AM    PROT 6.7 08/05/2016 03:02 PM    BILITOT 0.4 11/04/2022 08:16 AM    BILIDIR <0.2 11/04/2022 08:16 AM    IBILI see below 11/04/2022 08:16 AM    LABALBU 4.5 11/04/2022 08:16 AM       Last CK: No results found for: CKTOTAL    Last ESR:  No results found for: SEDRATE    Last CRP:  No results found for: CRP      Imaging: All pertinent images and reports for the current visit were reviewed by me during this visit. I reviewed the chest x-ray/CT scan/MRI images and independently interpreted the findings and results today. Outside records:    Labs, Microbiology,Radiology and pertinent results from Care everywhere, if available, were reviewed as a part of the consultation. Problem list:       Patient Active Problem List   Diagnosis Code    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 ([facility] [address] C34.11    Cavitary lung disease J98.4    Pulmonary nodule R91.1       Microbiology:       All available micro data was reviewed by me during this visit        Allergies and immunizations:       Known drug Allergies: All allergies data was reviewed by me during this visit  Ace inhibitors    Immunizations: All immunizations were reviewed byme in detail.      Immunization History   Administered Date(s) Administered    COVID-19, PFIZER Bivalent BOOSTER, Last COVID Lab No results found for: SARS-COV-2, SARS-COV-2 RNA, SARS-COV-2, SARS-COV-2, SARS-COV-2 BY PCR, SARS-COV-2, SARS-COV-2, SARS-COV-2         IMPRESSION:    The patient is a 69 y.o. old male who  has a past medical history of Acid fast bacillus (09/21/2016), Aneurysm ([facility] [address] (2010), Cancer ([facility] [address] (1999), Cavitary lesion of lung, Hyperlipidemia, Hypertension, Pneumonia, and Pulmonary nodules. was seen today for following problems:    1. SOPHIE (mycobacterium avium-intracellulare) infection ([facility]    2. Therapeutic drug monitoring    3. Cavitary lung disease    4. History of lung cancer    5. Mycobacterium abscessus infection    6. Pulmonary nodule    7. History of lobectomy of lung    8. Encounter for medication counseling    9. Need for 23-polyvalent pneumococcal polysaccharide vaccine        Discussion:      I had a long discussion with the patient today. I reviewed the CT scan of his chest that he had done yesterday and also compared the CT images with the previous CT scan that he had done in June of this year. A previous CT scan was a low-dose CT scan and has not the best resolution. The patient has history of right upper lobe lobectomy. He has severe COPD changes in both lungs, most pronounced in the left lung and he has bilateral lower lobe bullous disease. The patient is very well compliant with his antibiotics. Patient asked me to review all of his vaccinations as well. He has received his COVID-19 Bivalent shot last week. He has also received his flu shot for this season. RECOMMENDATIONS:      Diagnostic Workup: Will order CBC and CMP to be done in 3 months from now  Continue to follow fever curve  at home      Antimicrobials:     Will order refill on his antibiotics  Will order oral clarithromycin 500 mg twice daily for 4 months  Will order oral rifampin 300 mg twice daily for 4 months  Will order oral ethambutol 1200 mg daily for 4 months  Discussed the importance of antibiotic compliance with the patient  Had a long discussion with the patient and his wife. Explained to him that the goal of the treatment is symptomatic improvement and typically we treat for 12 to 18 months  Patient's Pneumovax 23 vaccine was in 2011. Given his high risk at baseline due to his COPD and history of lung cancer and atypical mycobacterial infection, he will be eligible for repeat Pneumovax 23 vaccine. Unfortunately, we do not have it in the clinic today. I have advised patient to get it done at any local pharmacy  I addressed all of their questions and concerns to the best of their satisfaction  Discussed with patient the strategies to stay safe from COVID 19 exposures including safe social distancing, frequent and proper hand hygiene when outside and using 3 layered mouth and nose coverings/facemasks when outside their home. Labs ordered today in EPIC:    Orders Placed This Encounter   Procedures    CBC with Auto Differential     Standing Status:   Future     Standing Expiration Date:   11/8/2023    Comprehensive Metabolic Panel     Standing Status:   Future     Standing Expiration Date:   11/8/2023       Medications ordered today in EPIC:    Orders Placed This Encounter   Medications    rifAMPin (RIFADIN) 300 MG capsule     Sig: Take 1 capsule by mouth 2 times daily     Dispense:  60 capsule     Refill:  3    clarithromycin (BIAXIN) 500 MG tablet     Sig: Take 1 tablet by mouth 2 times daily     Dispense:  60 tablet     Refill:  3    ethambutol (MYAMBUTOL) 400 MG tablet     Sig: Take 3 tablets by mouth daily     Dispense:  90 tablet     Refill:  3       Drug Monitoring:    Monitor for antibiotic toxicity as follows: CBC, CMP    Rifamycins related instructions : The patient  is on a Rifamycin (Rifampin/Rifabutin/Rifapentine) based regimen.  These drugs are potent inducers of Cytochrome P450 system and  can DECREASE the levels of multiple drugs in the body including ketoconazole and itraconazole, anticonvulsants, statins, cyclosporine, tacrolimus, digoxin, blood thinners like warfarin, benzodiazepines, ergotamines, steroids like methylprednisolone, Diana's Wort as well as certain HIV medication classes including NNRTIs and fusion inhibitors (maraviroc). So, dosage adjustements in these medications may be needed if patient is on them. Also, Rifampin will change the color of urine and body secretions including tears and sweat to orange. If patient wears contact lens, they can get stained, so patient should rather wear eye glasses while on Rifampin. Routine lab monitoring for liver functions will be done. Patient education and counseling: The patient was educated in detail about the side-effects of various antibiotics and things to watch for like new rashes, lip swelling, severereaction, worsening diarrhea, break through fever etc.  Patient was instructed to stop antibiotics and call our office if these problems were to occur, or go to nearest ER ifexperiencing severe symptoms. Discussed patient's condition and what to expect. All of the patient's questions were addressed in a satisfactory manner and patient verbalizedunderstanding all instructions. Follow-up:    Follow-up with me in ID clinic or through video visit in 3-4 months    Level of complexity of visit and medical decision making: High         An electronic signature was used to authenticate this note. TIME SPENT TODAY:    - Spent over 41 minutes on visit (including interval history, physical exam, review of data including labs,cultures, imaging, development and implementation of treatment plan and coordination of care). - Over 50% of face-to-face time spent with pt counseling andeducation. Please note that this chart was generated using Dragon dictation software. Although every effort was made to ensure the accuracy of this automated transcription, some errors in transcription may have occurred inadvertently.  If you may need any clarification, please do not hesitate to contact me through EPIC or at the phone number provided below with my electronic signature. Any pictures or media included in this note were obtained after taking informed verbal consent from the patient and with their approval to include those in the patient's medical record. Thankyou for involving me inthe care of your patient. If you have any additional questions, please do not hesitate to contact me. Angela Jay MD, MPH, FACP, FIDSA  11/8/22 , 11:04 AM [facility] Infectious Disease   [address] [place] [address]., [address]  Office: [phone]  Fax: [phone]  In-person Clinic days:  Tuesday & Thursday a.m. Virtual clinic days: Monday, Wednesday & Friday a.m.

## 2023-02-23 ENCOUNTER — HOSPITAL ENCOUNTER (OUTPATIENT)
Age: 78
Discharge: HOME | End: 2023-02-23
Payer: MEDICARE

## 2023-02-23 DIAGNOSIS — Z51.81 THERAPEUTIC DRUG MONITORING: ICD-10-CM

## 2023-02-23 LAB
A/G RATIO: 1.8 (ref 1.1–2.2)
ALBUMIN SERPL-MCNC: 4.2 G/DL (ref 3.4–5)
ALP BLD-CCNC: 53 U/L (ref 40–129)
ALT SERPL-CCNC: 22 U/L (ref 10–40)
ANION GAP SERPL CALCULATED.3IONS-SCNC: 12 MMOL/L (ref 3–16)
AST SERPL-CCNC: 18 U/L (ref 15–37)
BASOPHILS ABSOLUTE: 0 K/UL (ref 0–0.2)
BASOPHILS RELATIVE PERCENT: 0.3 %
BILIRUB SERPL-MCNC: 0.5 MG/DL (ref 0–1)
BUN BLDV-MCNC: 21 MG/DL (ref 7–20)
CALCIUM SERPL-MCNC: 9.3 MG/DL (ref 8.3–10.6)
CHLORIDE BLD-SCNC: 100 MMOL/L (ref 99–110)
CO2: 28 MMOL/L (ref 21–32)
CREAT SERPL-MCNC: 1.3 MG/DL (ref 0.8–1.3)
EOSINOPHILS ABSOLUTE: 0.1 K/UL (ref 0–0.6)
EOSINOPHILS RELATIVE PERCENT: 1.3 %
GFR SERPL CREATININE-BSD FRML MDRD: 57 ML/MIN/{1.73_M2}
GLUCOSE BLD-MCNC: 115 MG/DL (ref 70–99)
HCT VFR BLD CALC: 41.6 % (ref 40.5–52.5)
HEMOGLOBIN: 15 G/DL (ref 13.5–17.5)
LYMPHOCYTES ABSOLUTE: 2.1 K/UL (ref 1–5.1)
LYMPHOCYTES RELATIVE PERCENT: 19.8 %
MCH RBC QN AUTO: 32.4 PG (ref 26–34)
MCHC RBC AUTO-ENTMCNC: 36 G/DL (ref 31–36)
MCV RBC AUTO: 89.8 FL (ref 80–100)
MONOCYTES ABSOLUTE: 0.9 K/UL (ref 0–1.3)
MONOCYTES RELATIVE PERCENT: 8.2 %
NEUTROPHILS ABSOLUTE: 7.4 K/UL (ref 1.7–7.7)
NEUTROPHILS RELATIVE PERCENT: 70.4 %
PDW BLD-RTO: 13.6 % (ref 12.4–15.4)
PLATELET # BLD: 200 K/UL (ref 135–450)
PMV BLD AUTO: 8.2 FL (ref 5–10.5)
POTASSIUM SERPL-SCNC: 4.5 MMOL/L (ref 3.5–5.1)
RBC # BLD: 4.63 M/UL (ref 4.2–5.9)
SODIUM BLD-SCNC: 140 MMOL/L (ref 136–145)
TOTAL PROTEIN: 6.5 G/DL (ref 6.4–8.2)
WBC # BLD: 10.6 K/UL (ref 4–11)

## 2023-02-23 PROCEDURE — 80053 COMPREHEN METABOLIC PANEL: CPT

## 2023-02-23 PROCEDURE — 36415 COLL VENOUS BLD VENIPUNCTURE: CPT

## 2023-02-23 PROCEDURE — 85025 COMPLETE CBC W/AUTO DIFF WBC: CPT

## 2023-02-28 ENCOUNTER — TELEMEDICINE (OUTPATIENT)
Dept: INFECTIOUS DISEASES | Age: 78
End: 2023-02-28

## 2023-02-28 DIAGNOSIS — Z90.2 HISTORY OF LOBECTOMY OF LUNG: ICD-10-CM

## 2023-02-28 DIAGNOSIS — R91.1 PULMONARY NODULE: ICD-10-CM

## 2023-02-28 DIAGNOSIS — Z51.81 THERAPEUTIC DRUG MONITORING: ICD-10-CM

## 2023-02-28 DIAGNOSIS — A31.0 MAI (MYCOBACTERIUM AVIUM-INTRACELLULARE) INFECTION (HCC): ICD-10-CM

## 2023-02-28 DIAGNOSIS — Z85.118 HISTORY OF LUNG CANCER: ICD-10-CM

## 2023-02-28 DIAGNOSIS — Z71.89 ENCOUNTER FOR MEDICATION COUNSELING: ICD-10-CM

## 2023-02-28 DIAGNOSIS — N17.9 AKI (ACUTE KIDNEY INJURY) (HCC): Primary | ICD-10-CM

## 2023-02-28 DIAGNOSIS — U07.1 COVID-19: ICD-10-CM

## 2023-02-28 DIAGNOSIS — J98.4 CAVITARY LUNG DISEASE: ICD-10-CM

## 2023-02-28 DIAGNOSIS — A31.8 MYCOBACTERIUM ABSCESSUS INFECTION: ICD-10-CM

## 2023-02-28 RX ORDER — RIFAMPIN 300 MG/1
300 CAPSULE ORAL 2 TIMES DAILY
Qty: 60 CAPSULE | Refills: 3 | Status: SHIPPED | OUTPATIENT
Start: 2023-02-28 | End: 2023-06-28

## 2023-02-28 RX ORDER — CLARITHROMYCIN 500 MG/1
500 TABLET, COATED ORAL 2 TIMES DAILY
Qty: 60 TABLET | Refills: 3 | Status: SHIPPED | OUTPATIENT
Start: 2023-02-28 | End: 2023-06-28

## 2023-02-28 RX ORDER — ETHAMBUTOL HYDROCHLORIDE 400 MG/1
1200 TABLET, FILM COATED ORAL DAILY
Qty: 90 TABLET | Refills: 3 | Status: SHIPPED | OUTPATIENT
Start: 2023-02-28 | End: 2023-06-28

## 2023-02-28 ASSESSMENT — ENCOUNTER SYMPTOMS
ABDOMINAL PAIN: 0
BACK PAIN: 0
WHEEZING: 0
SINUS PRESSURE: 0
EYE REDNESS: 0
CONSTIPATION: 0
RHINORRHEA: 1
NAUSEA: 0
EYE DISCHARGE: 0
SHORTNESS OF BREATH: 0
DIARRHEA: 0
SORE THROAT: 0
SINUS PAIN: 0
COUGH: 1

## 2023-02-28 NOTE — PROGRESS NOTES
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 pleasant male patient, who had an outpatient visit with me today for follow-up. History was obtained from chart review and the patient. The patient had a virtual clinic visit with me today for above mentioned complaints. 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 Joseph Spicer in 2018. 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 The patient establish care with me in August 2022. I had started the patient on clarithromycin, ethambutol and rifampin based on the culture and sensitivity to both bacteria. He had a last follow-up with me in November 4022. He did a follow-up video visit with me today. Past Medical History:   Past medical and surgical history was reviewed by me during this visit in detail.     Past Medical History:   Diagnosis Date    Acid fast bacillus 09/21/2016    sputum - QuantiFERON-TB Gold is negative - DNA probe negative for TB    Aneurysm ([facility] [address] 2010    brain    Cancer ([facility] [address] 1999    lung    Cavitary lesion of lung     Hyperlipidemia     Hypertension     Pneumonia     Pulmonary nodules        Past Surgical History:    Past Surgical History:   Procedure Laterality Date    BRONCHOSCOPY  09/21/2016    BRONCHOSCOPY N/A 6/28/2022    MONARCH ROBOT (7:00) NO LABS performed by Perfecto Lundborg, MD at [facility]  6/28/2022    BRONCHOSCOPY performed by Perfecto Lundborg, MD at [facility]  6/28/2022    BRONCHOSCOPY/TRANSBRONCHIAL NEEDLE BIOPSY ROBOTIC performed by Perfecto Lundborg, MD at [facility]  6/28/2022    BRONCHOSCOPY BIOPSY BRONCHUS ROBOTIC performed by Perfecto Lundborg, MD at [facility]  6/28/2022    BRONCHOSCOPY ALVEOLAR LAVAGE ROBOTIC performed by Perfecto Lundborg, MD at [facility]  2012    [address]    right ring finger    [address]    lobe removed right    NOSE SURGERY  1964    deviated septum    OTHER SURGICAL HISTORY  2010    cerebral aneurysm wire       Current Medications: All medications were reviewed by me during this visit  Current Outpatient Medications   Medication Sig Dispense Refill    rifAMPin (RIFADIN) 300 MG capsule Take 1 capsule by mouth 2 times daily 60 capsule 3    clarithromycin (BIAXIN) 500 MG tablet Take 1 tablet by mouth 2 times daily 60 tablet 3    ethambutol (MYAMBUTOL) 400 MG tablet Take 3 tablets by mouth daily 90 tablet 3    albuterol sulfate HFA (PROAIR HFA) 108 (90 Base) MCG/ACT inhaler Inhale 2 puffs into the lungs every 6 hours as needed for Wheezing or Shortness of Breath 1 each 2    pravastatin (PRAVACHOL) 40 MG tablet Take 1 tablet by mouth daily  1    losartan (COZAAR) 100 MG tablet Take 100 mg by mouth daily. hydrochlorothiazide (HYDRODIURIL) 25 MG tablet Take 25 mg by mouth daily Takes 0.5 tab daily      aspirin 325 MG tablet Take 325 mg by mouth daily. No current facility-administered medications for this visit. Family history:  All family history was reviewed by me today    Family History   Problem Relation Age of Onset    Hypertension Mother     Hypertension Father     Asthma Neg Hx     Cancer Neg Hx     Diabetes Neg Hx     Heart Failure Neg Hx     Emphysema Neg Hx        No family history of immunocompromising or autoimmune conditions. No h/o TB in in family or contacts    REVIEW OF SYSTEMS:      Review of Systems   Constitutional:  Negative for chills, diaphoresis and fever. HENT:  Positive for rhinorrhea. Negative for ear discharge, ear pain, postnasal drip, sinus pressure, sinus pain and sore throat. Eyes:  Negative for discharge and redness. Respiratory:  Positive for cough. Negative for shortness of breath and wheezing. Cardiovascular:  Negative for chest pain and leg swelling. Gastrointestinal:  Negative for abdominal pain, constipation, diarrhea and nausea. Endocrine: Negative for cold intolerance, heat intolerance and polydipsia. Genitourinary:  Negative for dysuria, flank pain, frequency, hematuria and urgency. Musculoskeletal:  Negative for back pain and myalgias. Skin:  Negative for rash. Allergic/Immunologic: Negative for immunocompromised state. Neurological:  Negative for dizziness, seizures and headaches. Hematological:  Does not bruise/bleed easily. Psychiatric/Behavioral:  Negative for agitation, hallucinations and suicidal ideas. The patient is not nervous/anxious. All other systems reviewed and are negative. PHYSICAL EXAM:      There were no vitals filed for this visit. Physical Exam  Constitutional:       General: He is not in acute distress. Appearance: Normal appearance. He is not ill-appearing or toxic-appearing. Comments: The patient was seen today via virtual video visit   HENT:      Head: Normocephalic. Right Ear: External ear normal.      Left Ear: External ear normal.      Nose: Nose normal.      Mouth/Throat:      Pharynx: No oropharyngeal exudate. Eyes:      General: No scleral icterus. Right eye: No discharge. Left eye: No discharge. Extraocular Movements: Extraocular movements intact.    Pulmonary:      Effort: Pulmonary effort is normal.   Musculoskeletal: General: No swelling. Normal range of motion. Cervical back: Normal range of motion. Skin:     Coloration: Skin is not jaundiced. Findings: No bruising or erythema. Neurological:      Mental Status: He is alert and oriented to person, place, and time. Mental status is at baseline. Gait: Gait normal.   Psychiatric:         Mood and Affect: Mood normal.         Behavior: Behavior normal.         Thought Content: Thought content normal.         Judgment: Judgment normal.            DATA:  All available lab data wasreviewed by me during this visit    CBC:  Lab Results   Component Value Date    WBC 10.6 02/23/2023    HGB 15.0 02/23/2023    HCT 41.6 02/23/2023    MCV 89.8 02/23/2023     02/23/2023    LABLYMP 2.4 08/05/2016    MID 0.6 08/05/2016    GRAN 6.6 08/05/2016    LYMPHOPCT 19.8 02/23/2023    MIDPERCENT 5.9 08/05/2016    GRANULOCYTES 69.3 08/05/2016    RBC 4.63 02/23/2023    MCH 32.4 02/23/2023    MCHC 36.0 02/23/2023    RDW 13.6 02/23/2023       Last BMP:  Lab Results   Component Value Date/Time     02/23/2023 08:41 AM    K 4.5 02/23/2023 08:41 AM    K 4.1 06/28/2022 06:30 AM     02/23/2023 08:41 AM    CO2 28 02/23/2023 08:41 AM    BUN 21 02/23/2023 08:41 AM    CREATININE 1.3 02/23/2023 08:41 AM    GLUCOSE 115 02/23/2023 08:41 AM    GLUCOSE 88 08/05/2016 03:02 PM    CALCIUM 9.3 02/23/2023 08:41 AM        Hepatic Function Panel:  Lab Results   Component Value Date/Time    ALKPHOS 53 02/23/2023 08:41 AM    ALT 22 02/23/2023 08:41 AM    AST 18 02/23/2023 08:41 AM    PROT 6.5 02/23/2023 08:41 AM    PROT 6.7 08/05/2016 03:02 PM    BILITOT 0.5 02/23/2023 08:41 AM    BILIDIR <0.2 11/04/2022 08:16 AM    IBILI see below 11/04/2022 08:16 AM    LABALBU 4.2 02/23/2023 08:41 AM       Last CK: No results found for: CKTOTAL    Last ESR:  No results found for: SEDRATE    Last CRP:  No results found for: CRP      Imaging:     All pertinent images and reports for the current visit were reviewed by me during this visit. I reviewed the chest x-ray/CT scan/MRI images and independently interpreted the findings and results today. Outside records:    Labs, Microbiology,Radiology and pertinent results from Care everywhere, if available, were reviewed as a part of the consultation. Problem list:       Patient Active Problem List   Diagnosis Code    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 ([facility] [address] C34.11    Cavitary lung disease J98.4    Pulmonary nodule R91.1       Microbiology:       All available micro data was reviewed by me during this visit    Abnormal       INTERNAL CONTROLS VALID  Yes--Test working appropriately    Expiration Date  3,142,026    Lot Number  [id number]    Test [employer] Test    Resulting Agency  [facility] [id number]   Specimen Collected: 02/27/23 10:27 Last Resulted: 02/27/23 10:27   Received From: Eli Eckert  Result Received: 02/28/23 10:23          Allergies and immunizations:       Known drug Allergies: All allergies data was reviewed by me during this visit  Ace inhibitors    Immunizations: All immunizations were reviewed byme in detail.      Immunization History   Administered Date(s) Administered    COVID-19, PFIZER GRAY top, DO NOT Dilute, (age 15 y+), IM, 30 mcg/0.3 mL 06/21/2022    COVID-19, PFIZER PURPLE top, DILUTE for use, (age 15 y+), 30mcg/0.3mL 02/25/2021, 03/18/2021, 09/03/2021    COVID-19, [place] Vaccine, Vaccine Unspecified 11/03/2022    Influenza Vaccine, unspecified formulation 09/28/2015, 11/04/2016    Influenza Virus Vaccine 09/11/2009, 10/07/2011, 11/03/2013    Influenza, AFLURIA (age 1 yrs+), FLUZONE, (age 10 mo+), MDV, 0.5mL 09/11/2009, 10/07/2011, 11/03/2013    Influenza, FLUAD, (age 72 y+), Adjuvanted, 0.5mL 09/29/2022    Influenza, FLUZONE (age 72 y+), High Dose, 0.7mL 09/25/2021    Influenza, High Dose (Fluzone 65 yrs and older) 09/28/2015, 11/04/2016, 11/13/2017, 10/22/2018, 11/02/2019, 10/22/2020    PPD Test 2016    Pneumococcal Conjugate 13-valent ([id number]) 2015    Pneumococcal Polysaccharide ([id number]) 11/15/2011    Td, unspecified formulation 2012    Zoster Live (Zostavax) 2011    Zoster Recombinant (Shingrix) 2021, 2021    Zoster Vaccine 2011       SocialHistory:  All social and epidemiologic history was reviewed and updated be me in detail today. Social History     Socioeconomic History    Marital status:    Tobacco Use    Smoking status: Former     Packs/day: 1.50     Years: 50.00     Pack years: 75.00     Types: Cigarettes     Quit date: 10/29/2011     Years since quittin.3    Smokeless tobacco: Never   Vaping Use    Vaping Use: Never used   Substance and Sexual Activity    Alcohol use: Yes     Alcohol/week: 0.0 standard drinks     Comment: social    Drug use: No    Sexual activity: Never     Partners: Female       COVID VACCINATION AND LAB RESULT RECORDS:     Internal Administration   First Dose COVID-19, PFIZER PURPLE top, DILUTE for use, (age 15 y+), 30mcg/0.3mL  2021   Second Dose COVID-19, PFIZER PURPLE top, DILUTE for use, (age 15 y+), 30mcg/0.3mL   2021       Last COVID Lab No results found for: SARS-COV-2, SARS-COV-2 RNA, SARS-COV-2, SARS-COV-2, SARS-COV-2 BY PCR, SARS-COV-2, SARS-COV-2, SARS-COV-2         IMPRESSION:    The patient is a 69 y.o. old male who  has a past medical history of Acid fast bacillus (2016), Aneurysm ([facility] [address] (), Cancer ([facility] [address] (), Cavitary lesion of lung, Hyperlipidemia, Hypertension, Pneumonia, and Pulmonary nodules. was seen today for following problems:    1. WEN (acute kidney injury) ([facility] [address]    2. COVID-19    3. SOPHIE (mycobacterium avium-intracellulare) infection ([facility] [address]    4. Cavitary lung disease    5. Therapeutic drug monitoring    6. History of lung cancer    7. Mycobacterium abscessus infection    8. Pulmonary nodule    9. Encounter for medication counseling    10.  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 Caryle Mu     The patient did a video visit with me today. The patient told me that he tested positive for COVID-19 yesterday and during the rapid test at [facility] lab. I was able to review the [facility] lab results via care everywhere to confirm that he tested positive. The patient  has been having symptoms for about a week. His symptoms at this time are mainly upper respiratory infection symptoms    He had CBC and CMP done on 2/23/2023. CBC was unremarkable. CMP indicated the patient had mild acute kidney injury with a serum creatinine of 1.3 and BUN of 21. It indicates slight prerenal azotemia, likely secondary to dehydration in setting of COVID-19    He had four COVID 19 vaccine doses so far including both of his boosters. RECOMMENDATIONS:      Diagnostic Workup: Will order CBC and CMP to be done in 3 months  Will order BMP to be done in 2 weeks  Continue to follow fever curve  at home      Antimicrobials:    I had a detailed discussion with the patient today. The patient has a pulse oximeter at home and checked his oxygen numbers today and it is 94%. He thinks that he is coming over the COVID symptoms and his symptoms mainly cold symptoms at this time. He does have slightly more dry cough than usual  I advised him to keep on checking his oxygen numbers twice a day. His wife is also positive for COVID-19.   Her oxygen numbers have been running at around 97%  I offered the patient a course of oral Paxlovid and had a detailed discussion about that with the patient. The patient does not think that he needs to start Paxlovid as he is toward the tail end of his COVID 19 symptoms  No indication for starting steroids at this time. He is saturating well on room air  Regarding nontypical mycobacterial infection, the patient is on rifampin, ethambutol and clarithromycin. He is using his last refill of these antibiotics. I have ordered these antibiotics for 1 more month with 3 additional refills  Discussed the importance of antibiotic compliance with the patient in detail. Discussed with patient the strategies to stay safe from COVID 19 exposures including safe social distancing, frequent and proper hand hygiene when outside and using 3 layered mouth and nose coverings/facemasks when outside their home. Labs ordered today in EPIC:    Orders Placed This Encounter   Procedures    Basic Metabolic Panel     Standing Status:   Future     Standing Expiration Date:   2/28/2024    CBC with Auto Differential     Standing Status:   Future     Standing Expiration Date:   2/28/2024    Comprehensive Metabolic Panel     Standing Status:   Future     Standing Expiration Date:   2/28/2024       Medications ordered today in EPIC:    Orders Placed This Encounter   Medications    rifAMPin (RIFADIN) 300 MG capsule     Sig: Take 1 capsule by mouth 2 times daily     Dispense:  60 capsule     Refill:  3    clarithromycin (BIAXIN) 500 MG tablet     Sig: Take 1 tablet by mouth 2 times daily     Dispense:  60 tablet     Refill:  3    ethambutol (MYAMBUTOL) 400 MG tablet     Sig: Take 3 tablets by mouth daily     Dispense:  90 tablet     Refill:  3       Drug Monitoring:    Monitor for antibiotic toxicity as follows: CBC, CMP in 3 months        Patient education and counseling:     The patient was educated in detail about the side-effects of various antibiotics and things to watch for like new rashes, lip swelling, severereaction, worsening diarrhea, break through fever etc.  Patient was instructed to stop antibiotics and call our office if these problems were to occur, or go to nearest ER ifexperiencing severe symptoms. Discussed patient's condition and what to expect. All of the patient's questions were addressed in a satisfactory manner and patient verbalizedunderstanding all instructions. Follow-up:    Follow-up with me in ID clinic or through video visit in 3 months    Level of complexity of visit and medical decision making: High     Illness(es)/ Infection present that pose threat to bodily function. There is potential for severe exacerbation of infection/side effects of treatment. Therapy requires regular monitoring for antimicrobial agent toxicity. Madison Holland is a 69 y.o. male being evaluated by a Virtual Visit encounter to address concerns as mentioned above. A caregiver was present when appropriate. Due to this being a TeleHealth encounter (During XRQKI-37 public health emergency), evaluation of the following organ systems was limited: Vitals/Constitutional/EENT/Resp/CV/GI//MS/Neuro/Skin/Heme-Lymph-Imm. Pursuant to the emergency declaration under the [address] [place] [address] authority and the [employer] and [employer] Act, this Virtual Visit was conducted with patient's (and/or legal guardian's) consent, to reduce the patient's risk of exposure to COVID-19 and provide necessary medical care. The patient (and/or legal guardian) has also been advised to contact this office for worsening conditions or problems, and seek emergency medical treatment and/or call 911 if deemed necessary. Services were provided through an audio and/or video synchronous discussion virtually to substitute for in-person clinic visit. Patient and provider were located at their individual homes.     --Pooja Ledezma MD on 2/28/23  at 10:52 AM EST         An electronic signature was used to authenticate this note. TIME SPENT TODAY:    - Spent over 32 minutes on visit (including interval history, physical exam, review of data including labs,cultures, imaging, development and implementation of treatment plan and coordination of care). - Over 50% of face-to-face time spent with pt counseling andeducation. Please note that this chart was generated using Dragon dictation software. Although every effort was made to ensure the accuracy of this automated transcription, some errors in transcription may have occurred inadvertently. If you may need any clarification, please do not hesitate to contact me through EPIC or at the phone number provided below with my electronic signature. Any pictures or media included in this note were obtained after taking informed verbal consent from the patient and with their approval to include those in the patient's medical record. Thankyou for involving me inthe care of your patient. If you have any additional questions, please do not hesitate to contact me. Herman Maciel MD, MPH, Wally Murphy  2/28/23 , 10:52 AM [facility] Infectious Disease   [address] [place] [address], [address] [place] [address]  Office: [phone]  Fax: [phone]  In-person Clinic days:  Tuesday & Thursday a.m. Virtual clinic days: Monday, Wednesday & Friday a.m.

## 2023-03-07 RX ORDER — ALBUTEROL SULFATE 90 UG/1
2 AEROSOL, METERED RESPIRATORY (INHALATION) EVERY 6 HOURS PRN
Qty: 6.7 EACH | Refills: 1 | Status: SHIPPED | OUTPATIENT
Start: 2023-03-07

## 2023-03-14 ENCOUNTER — HOSPITAL ENCOUNTER (OUTPATIENT)
Age: 78
Discharge: HOME | End: 2023-03-14
Payer: MEDICARE

## 2023-03-14 DIAGNOSIS — N17.9 AKI (ACUTE KIDNEY INJURY) (HCC): ICD-10-CM

## 2023-03-14 LAB
ALBUMIN SERPL-MCNC: 4 G/DL (ref 3.4–5)
ALBUMIN/GLOB SERPL: 1.8 {RATIO} (ref 1.1–2.2)
ALP SERPL-CCNC: 53 U/L (ref 40–129)
ALT SERPL-CCNC: 12 U/L (ref 10–40)
ANION GAP SERPL CALCULATED.3IONS-SCNC: 12 MMOL/L (ref 3–16)
AST SERPL-CCNC: 15 U/L (ref 15–37)
BASOPHILS # BLD: 0.1 K/UL (ref 0–0.2)
BASOPHILS NFR BLD: 0.5 %
BILIRUB SERPL-MCNC: 0.3 MG/DL (ref 0–1)
BUN SERPL-MCNC: 16 MG/DL (ref 7–20)
CALCIUM SERPL-MCNC: 9.2 MG/DL (ref 8.3–10.6)
CHLORIDE SERPL-SCNC: 103 MMOL/L (ref 99–110)
CHOLEST SERPL-MCNC: 163 MG/DL (ref 0–199)
CO2 SERPL-SCNC: 29 MMOL/L (ref 21–32)
CREAT SERPL-MCNC: 1.1 MG/DL (ref 0.8–1.3)
DEPRECATED RDW RBC AUTO: 13.8 % (ref 12.4–15.4)
EOSINOPHIL # BLD: 0.4 K/UL (ref 0–0.6)
EOSINOPHIL NFR BLD: 3.8 %
GFR SERPLBLD CREATININE-BSD FMLA CKD-EPI: >60 ML/MIN/{1.73_M2}
GLUCOSE SERPL-MCNC: 113 MG/DL (ref 70–99)
HCT VFR BLD AUTO: 39.2 % (ref 40.5–52.5)
HDLC SERPL-MCNC: 62 MG/DL (ref 40–60)
HGB BLD-MCNC: 13.7 G/DL (ref 13.5–17.5)
LDLC SERPL CALC-MCNC: 86 MG/DL
LYMPHOCYTES # BLD: 3.8 K/UL (ref 1–5.1)
LYMPHOCYTES NFR BLD: 38.7 %
MCH RBC QN AUTO: 32.2 PG (ref 26–34)
MCHC RBC AUTO-ENTMCNC: 34.9 G/DL (ref 31–36)
MCV RBC AUTO: 92.3 FL (ref 80–100)
MONOCYTES # BLD: 0.7 K/UL (ref 0–1.3)
MONOCYTES NFR BLD: 6.7 %
NEUTROPHILS # BLD: 5 K/UL (ref 1.7–7.7)
NEUTROPHILS NFR BLD: 50.3 %
PLATELET # BLD AUTO: 269 K/UL (ref 135–450)
PMV BLD AUTO: 7.7 FL (ref 5–10.5)
POTASSIUM SERPL-SCNC: 4.2 MMOL/L (ref 3.5–5.1)
PROT SERPL-MCNC: 6.2 G/DL (ref 6.4–8.2)
RBC # BLD AUTO: 4.25 M/UL (ref 4.2–5.9)
SODIUM SERPL-SCNC: 144 MMOL/L (ref 136–145)
TRIGL SERPL-MCNC: 75 MG/DL (ref 0–150)
VLDLC SERPL CALC-MCNC: 15 MG/DL
WBC # BLD AUTO: 9.9 K/UL (ref 4–11)

## 2023-03-14 PROCEDURE — 80053 COMPREHEN METABOLIC PANEL: CPT

## 2023-03-14 PROCEDURE — 85025 COMPLETE CBC W/AUTO DIFF WBC: CPT

## 2023-03-14 PROCEDURE — 36415 COLL VENOUS BLD VENIPUNCTURE: CPT

## 2023-03-14 PROCEDURE — 80061 LIPID PANEL: CPT

## 2023-03-14 PROCEDURE — 83036 HEMOGLOBIN GLYCOSYLATED A1C: CPT

## 2023-03-15 LAB
EST. AVERAGE GLUCOSE BLD GHB EST-MCNC: 88.2 MG/DL
HBA1C MFR BLD: 4.7 %

## 2023-05-04 DIAGNOSIS — J98.4 CAVITARY LUNG DISEASE: Primary | ICD-10-CM

## 2023-05-04 RX ORDER — ALBUTEROL SULFATE 90 UG/1
2 AEROSOL, METERED RESPIRATORY (INHALATION) EVERY 6 HOURS PRN
Qty: 6.7 EACH | Refills: 1 | Status: SHIPPED | OUTPATIENT
Start: 2023-05-04

## 2023-05-23 ENCOUNTER — OFFICE VISIT (OUTPATIENT)
Dept: INFECTIOUS DISEASES | Age: 78
End: 2023-05-23
Payer: MEDICARE

## 2023-05-23 VITALS
OXYGEN SATURATION: 94 % | SYSTOLIC BLOOD PRESSURE: 159 MMHG | DIASTOLIC BLOOD PRESSURE: 75 MMHG | TEMPERATURE: 97.9 F | HEART RATE: 76 BPM | BODY MASS INDEX: 21.16 KG/M2 | HEIGHT: 72 IN | WEIGHT: 156.2 LBS

## 2023-05-23 DIAGNOSIS — Z71.89 ENCOUNTER FOR MEDICATION COUNSELING: ICD-10-CM

## 2023-05-23 DIAGNOSIS — Z90.2 HISTORY OF LOBECTOMY OF LUNG: ICD-10-CM

## 2023-05-23 DIAGNOSIS — R91.1 PULMONARY NODULE: ICD-10-CM

## 2023-05-23 DIAGNOSIS — A31.8 MYCOBACTERIUM ABSCESSUS INFECTION: ICD-10-CM

## 2023-05-23 DIAGNOSIS — Z85.118 HISTORY OF LUNG CANCER: ICD-10-CM

## 2023-05-23 DIAGNOSIS — Z51.81 THERAPEUTIC DRUG MONITORING: ICD-10-CM

## 2023-05-23 DIAGNOSIS — J98.4 CAVITARY LUNG DISEASE: ICD-10-CM

## 2023-05-23 DIAGNOSIS — A31.0 MAI (MYCOBACTERIUM AVIUM-INTRACELLULARE) INFECTION (HCC): Primary | ICD-10-CM

## 2023-05-23 PROCEDURE — 1123F ACP DISCUSS/DSCN MKR DOCD: CPT | Performed by: INTERNAL MEDICINE

## 2023-05-23 PROCEDURE — 99214 OFFICE O/P EST MOD 30 MIN: CPT | Performed by: INTERNAL MEDICINE

## 2023-05-23 RX ORDER — CLARITHROMYCIN 500 MG/1
500 TABLET, COATED ORAL 2 TIMES DAILY
Qty: 180 TABLET | Refills: 1 | Status: SHIPPED | OUTPATIENT
Start: 2023-05-23 | End: 2023-11-19

## 2023-05-23 RX ORDER — RIFAMPIN 300 MG/1
300 CAPSULE ORAL 2 TIMES DAILY
Qty: 180 CAPSULE | Refills: 1 | Status: SHIPPED | OUTPATIENT
Start: 2023-05-23 | End: 2023-11-19

## 2023-05-23 RX ORDER — ETHAMBUTOL HYDROCHLORIDE 400 MG/1
1200 TABLET, FILM COATED ORAL DAILY
Qty: 270 TABLET | Refills: 1 | Status: SHIPPED | OUTPATIENT
Start: 2023-05-23 | End: 2023-11-19

## 2023-05-23 ASSESSMENT — ENCOUNTER SYMPTOMS
NAUSEA: 0
EYE REDNESS: 0
COUGH: 0
RHINORRHEA: 0
SORE THROAT: 0
SINUS PAIN: 0
SHORTNESS OF BREATH: 0
WHEEZING: 0
ABDOMINAL PAIN: 0
BACK PAIN: 0
CONSTIPATION: 0
DIARRHEA: 0
EYE DISCHARGE: 0
SINUS PRESSURE: 0

## 2023-05-23 NOTE — PROGRESS NOTES
Infectious Diseases Oupatient Follow-up Note            Reason for Visit:               Mycobacterium  Primary Care Physician:  Derrick Landeros DO  History Obtained From:   Patient , Medical Records     CHIEF COMPLAINT:    Chief Complaint   Patient presents with    Follow-up     SOPHIE -nk       INTERVAL HISTORY: Kayleen Delarosa is a 69 y.o. pleasant male patient, who had an outpatient visit with me today for follow-up. History was obtained from chart review and the patient. The patient had a in-person clinic visit with me today for above mentioned complaints. 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 Papo Townsend in 2018. He has been seeing Dr. Sneha Spear, pulmonologist at [facility] and had a bronchoscopy done with BAL and cultures on 6/28/2022. BAL cultures came back positive for Mycobacterium avium and Mycobacterium abscessus complex. The patient has history of lung cancer underwent right lung lobectomy more than 2 decades ago. He also has centrilobular emphysema. The patient establish care with me on 8/9/2022. I started the patient on oral clarithromycin, rifampin and ethambutol. The patient tested positive for COVID-19 on February 27. .  A video call with me on February 28. His wife had also tested positive for COVID-19. The patient was showing clinical improvement and hence decided to stay on symptomatic treatment. As far as atypical mycobacterial infection is concerned, he has been tolerating the antibiotics well. Since it is the second episode of atypical mycobacterial infection, I had planned for at least 18 months of treatment with antibiotics, if patient keeps on tolerating those okay.     He was seen today in the clinic for a follow-up      Past Medical History:   Past medical and surgical history was reviewed by me during this visit in

## 2023-05-24 ENCOUNTER — HOSPITAL ENCOUNTER (OUTPATIENT)
Age: 78
Discharge: HOME | End: 2023-05-24
Payer: MEDICARE

## 2023-05-24 DIAGNOSIS — A31.0 MAI (MYCOBACTERIUM AVIUM-INTRACELLULARE) INFECTION (HCC): ICD-10-CM

## 2023-05-24 LAB
ALBUMIN SERPL-MCNC: 3.9 G/DL (ref 3.4–5)
ALBUMIN/GLOB SERPL: 1.8 {RATIO} (ref 1.1–2.2)
ALP SERPL-CCNC: 46 U/L (ref 40–129)
ALT SERPL-CCNC: 25 U/L (ref 10–40)
ANION GAP SERPL CALCULATED.3IONS-SCNC: 7 MMOL/L (ref 3–16)
AST SERPL-CCNC: 22 U/L (ref 15–37)
BASOPHILS # BLD: 0.1 K/UL (ref 0–0.2)
BASOPHILS NFR BLD: 1 %
BILIRUB SERPL-MCNC: 0.3 MG/DL (ref 0–1)
BUN SERPL-MCNC: 20 MG/DL (ref 7–20)
CALCIUM SERPL-MCNC: 9 MG/DL (ref 8.3–10.6)
CHLORIDE SERPL-SCNC: 104 MMOL/L (ref 99–110)
CO2 SERPL-SCNC: 30 MMOL/L (ref 21–32)
CREAT SERPL-MCNC: 1.1 MG/DL (ref 0.8–1.3)
DEPRECATED RDW RBC AUTO: 13.2 % (ref 12.4–15.4)
EOSINOPHIL # BLD: 0.4 K/UL (ref 0–0.6)
EOSINOPHIL NFR BLD: 4 %
GFR SERPLBLD CREATININE-BSD FMLA CKD-EPI: >60 ML/MIN/{1.73_M2}
GLUCOSE SERPL-MCNC: 94 MG/DL (ref 70–99)
HCT VFR BLD AUTO: 41 % (ref 40.5–52.5)
HGB BLD-MCNC: 14.6 G/DL (ref 13.5–17.5)
LYMPHOCYTES # BLD: 3.7 K/UL (ref 1–5.1)
LYMPHOCYTES NFR BLD: 36 %
MCH RBC QN AUTO: 32.7 PG (ref 26–34)
MCHC RBC AUTO-ENTMCNC: 35.7 G/DL (ref 31–36)
MCV RBC AUTO: 91.6 FL (ref 80–100)
MONOCYTES # BLD: 0.5 K/UL (ref 0–1.3)
MONOCYTES NFR BLD: 5 %
NEUTROPHILS # BLD: 5.2 K/UL (ref 1.7–7.7)
NEUTROPHILS NFR BLD: 53 %
PLATELET # BLD AUTO: 186 K/UL (ref 135–450)
PLATELET BLD QL SMEAR: ADEQUATE
PMV BLD AUTO: 8.2 FL (ref 5–10.5)
POTASSIUM SERPL-SCNC: 4.6 MMOL/L (ref 3.5–5.1)
PROT SERPL-MCNC: 6.1 G/DL (ref 6.4–8.2)
RBC # BLD AUTO: 4.48 M/UL (ref 4.2–5.9)
RBC MORPH BLD: NORMAL
SLIDE REVIEW: NORMAL
SODIUM SERPL-SCNC: 141 MMOL/L (ref 136–145)
VARIANT LYMPHS NFR BLD MANUAL: 1 % (ref 0–6)
WBC # BLD AUTO: 9.9 K/UL (ref 4–11)

## 2023-05-24 PROCEDURE — 80053 COMPREHEN METABOLIC PANEL: CPT

## 2023-05-24 PROCEDURE — 36415 COLL VENOUS BLD VENIPUNCTURE: CPT

## 2023-05-24 PROCEDURE — 85025 COMPLETE CBC W/AUTO DIFF WBC: CPT

## 2023-05-31 ENCOUNTER — TELEPHONE (OUTPATIENT)
Dept: TELEMETRY | Age: 78
End: 2023-05-31

## 2023-05-31 NOTE — TELEPHONE ENCOUNTER
Patient due for annual CT Lung Screening. Reminder letter mailed. Routed to PCP, Mike Jimenez DO with [facility].     Rick Liu RN

## 2023-07-29 ENCOUNTER — TELEPHONE (OUTPATIENT)
Dept: TELEMETRY | Age: 78
End: 2023-07-29

## 2023-07-29 NOTE — TELEPHONE ENCOUNTER
Patient due for annual CT Lung Screening. Reminder letter mailed. Routed to PCP, Best Stephen,  with [facility].     Lisa Chambers RN

## 2023-08-11 ENCOUNTER — TELEPHONE (OUTPATIENT)
Dept: INFECTIOUS DISEASES | Age: 78
End: 2023-08-11

## 2023-08-11 NOTE — TELEPHONE ENCOUNTER
Per Dr. Mcfarlane Givens: I do not see a need to do it right away.   Okay to wait until November if patient is clinically doing well     Spoke with patient and advised of MD note and he v/u

## 2023-08-11 NOTE — TELEPHONE ENCOUNTER
Patient called in stating he received notification he is due for a CT lung screening, he contacted his pulmonologist Dr. Octavio Rice office who stated he should check to see if Dr. Julia Jacobson wants this done prior to his next appt on 11/2/23  Please advise

## 2023-08-29 ENCOUNTER — TELEPHONE (OUTPATIENT)
Dept: TELEMETRY | Age: 78
End: 2023-08-29

## 2023-08-29 NOTE — TELEPHONE ENCOUNTER
Patient due for annual CT Lung Screening. Reminder letter mailed. Routed to PCP, Tg Lo DO with [facility].     Stalin Ferrara RN

## 2023-09-23 DIAGNOSIS — J98.4 CAVITARY LUNG DISEASE: ICD-10-CM

## 2023-09-25 RX ORDER — ALBUTEROL SULFATE 90 UG/1
2 AEROSOL, METERED RESPIRATORY (INHALATION) EVERY 6 HOURS PRN
Qty: 6.7 EACH | Refills: 0 | Status: SHIPPED | OUTPATIENT
Start: 2023-09-25

## 2023-10-05 DIAGNOSIS — J98.4 CAVITARY LUNG DISEASE: ICD-10-CM

## 2023-10-05 RX ORDER — ALBUTEROL SULFATE 90 UG/1
2 AEROSOL, METERED RESPIRATORY (INHALATION) EVERY 6 HOURS PRN
Qty: 6.7 EACH | Refills: 0 | OUTPATIENT
Start: 2023-10-05

## 2023-10-09 ENCOUNTER — TELEPHONE (OUTPATIENT)
Dept: PULMONOLOGY | Age: 78
End: 2023-10-09

## 2023-10-09 DIAGNOSIS — A31.8 MYCOBACTERIUM ABSCESSUS IDENTIFIED ON DIAGNOSTIC TESTING: ICD-10-CM

## 2023-10-09 DIAGNOSIS — J98.4 CAVITARY LUNG DISEASE: ICD-10-CM

## 2023-10-09 DIAGNOSIS — J98.4 CAVITARY LUNG DISEASE: Primary | ICD-10-CM

## 2023-10-09 NOTE — TELEPHONE ENCOUNTER
Ct chest [facility] 10/20/23 @9am [facility]. Ct order pended. F/u [facility] with Dr Susie Min 11/2/23.

## 2023-10-10 RX ORDER — ALBUTEROL SULFATE 90 UG/1
2 AEROSOL, METERED RESPIRATORY (INHALATION) EVERY 6 HOURS PRN
Qty: 6.7 EACH | Refills: 1 | Status: SHIPPED | OUTPATIENT
Start: 2023-10-10

## 2023-10-20 ENCOUNTER — HOSPITAL ENCOUNTER (OUTPATIENT)
Dept: CT IMAGING | Age: 78
Discharge: HOME | End: 2023-10-20
Payer: MEDICARE

## 2023-10-20 DIAGNOSIS — A31.8 MYCOBACTERIUM ABSCESSUS IDENTIFIED ON DIAGNOSTIC TESTING: ICD-10-CM

## 2023-10-20 DIAGNOSIS — J98.4 CAVITARY LUNG DISEASE: ICD-10-CM

## 2023-10-20 PROCEDURE — 71250 CT THORAX DX C-: CPT

## 2023-10-30 DIAGNOSIS — A31.0 MAI (MYCOBACTERIUM AVIUM-INTRACELLULARE) INFECTION (HCC): ICD-10-CM

## 2023-10-31 ENCOUNTER — OFFICE VISIT (OUTPATIENT)
Dept: PULMONOLOGY | Age: 78
End: 2023-10-31
Payer: MEDICARE

## 2023-10-31 ENCOUNTER — HOSPITAL ENCOUNTER (OUTPATIENT)
Age: 78
Discharge: HOME | End: 2023-10-31
Payer: MEDICARE

## 2023-10-31 VITALS
BODY MASS INDEX: 21.84 KG/M2 | OXYGEN SATURATION: 94 % | RESPIRATION RATE: 16 BRPM | SYSTOLIC BLOOD PRESSURE: 136 MMHG | DIASTOLIC BLOOD PRESSURE: 72 MMHG | HEART RATE: 90 BPM | HEIGHT: 71 IN | WEIGHT: 156 LBS

## 2023-10-31 DIAGNOSIS — T81.82XA SUBCUTANEOUS EMPHYSEMA RESULTING FROM A PROCEDURE, INITIAL ENCOUNTER: ICD-10-CM

## 2023-10-31 DIAGNOSIS — J98.4 CAVITARY LUNG DISEASE: Primary | ICD-10-CM

## 2023-10-31 DIAGNOSIS — J47.9 BRONCHIECTASIS WITHOUT COMPLICATION (HCC): ICD-10-CM

## 2023-10-31 DIAGNOSIS — A31.8 MYCOBACTERIUM ABSCESSUS IDENTIFIED ON DIAGNOSTIC TESTING: ICD-10-CM

## 2023-10-31 LAB
ALBUMIN SERPL-MCNC: 4.3 G/DL (ref 3.4–5)
ALBUMIN/GLOB SERPL: 1.8 {RATIO} (ref 1.1–2.2)
ALP SERPL-CCNC: 52 U/L (ref 40–129)
ALT SERPL-CCNC: 20 U/L (ref 10–40)
ANION GAP SERPL CALCULATED.3IONS-SCNC: 12 MMOL/L (ref 3–16)
AST SERPL-CCNC: 23 U/L (ref 15–37)
BASOPHILS # BLD: 0 K/UL (ref 0–0.2)
BASOPHILS NFR BLD: 0.4 %
BILIRUB SERPL-MCNC: 0.4 MG/DL (ref 0–1)
BUN SERPL-MCNC: 20 MG/DL (ref 7–20)
CALCIUM SERPL-MCNC: 8.9 MG/DL (ref 8.3–10.6)
CHLORIDE SERPL-SCNC: 105 MMOL/L (ref 99–110)
CO2 SERPL-SCNC: 25 MMOL/L (ref 21–32)
CREAT SERPL-MCNC: 1.1 MG/DL (ref 0.8–1.3)
DEPRECATED RDW RBC AUTO: 13.1 % (ref 12.4–15.4)
EOSINOPHIL # BLD: 0.3 K/UL (ref 0–0.6)
EOSINOPHIL NFR BLD: 3.2 %
GFR SERPLBLD CREATININE-BSD FMLA CKD-EPI: >60 ML/MIN/{1.73_M2}
GLUCOSE SERPL-MCNC: 99 MG/DL (ref 70–99)
HCT VFR BLD AUTO: 42.1 % (ref 40.5–52.5)
HGB BLD-MCNC: 15 G/DL (ref 13.5–17.5)
LYMPHOCYTES # BLD: 4.6 K/UL (ref 1–5.1)
LYMPHOCYTES NFR BLD: 45.9 %
MCH RBC QN AUTO: 33.2 PG (ref 26–34)
MCHC RBC AUTO-ENTMCNC: 35.7 G/DL (ref 31–36)
MCV RBC AUTO: 93 FL (ref 80–100)
MONOCYTES # BLD: 0.7 K/UL (ref 0–1.3)
MONOCYTES NFR BLD: 7.2 %
NEUTROPHILS # BLD: 4.3 K/UL (ref 1.7–7.7)
NEUTROPHILS NFR BLD: 43.3 %
PLATELET # BLD AUTO: 211 K/UL (ref 135–450)
PMV BLD AUTO: 8.3 FL (ref 5–10.5)
POTASSIUM SERPL-SCNC: 4.5 MMOL/L (ref 3.5–5.1)
PROT SERPL-MCNC: 6.7 G/DL (ref 6.4–8.2)
RBC # BLD AUTO: 4.53 M/UL (ref 4.2–5.9)
SODIUM SERPL-SCNC: 142 MMOL/L (ref 136–145)
WBC # BLD AUTO: 10 K/UL (ref 4–11)

## 2023-10-31 PROCEDURE — 85025 COMPLETE CBC W/AUTO DIFF WBC: CPT

## 2023-10-31 PROCEDURE — 80053 COMPREHEN METABOLIC PANEL: CPT

## 2023-10-31 PROCEDURE — 99214 OFFICE O/P EST MOD 30 MIN: CPT | Performed by: INTERNAL MEDICINE

## 2023-10-31 PROCEDURE — 1123F ACP DISCUSS/DSCN MKR DOCD: CPT | Performed by: INTERNAL MEDICINE

## 2023-10-31 PROCEDURE — 36415 COLL VENOUS BLD VENIPUNCTURE: CPT

## 2023-10-31 RX ORDER — ROSUVASTATIN CALCIUM 10 MG/1
10 TABLET, COATED ORAL DAILY
COMMUNITY
Start: 2023-09-25

## 2023-10-31 NOTE — PROGRESS NOTES
[facility]                                                            Outpatient Follow Up Note      CHIEF COMPLAINT: Follow up CT chest       HPI:   CT chest reviewed by me and noted below. Results were dicussed with patient and multiple good questions were answered. Doing good  Cough with clear sputum  No weight loss   Had Covid twice- mild cases   Rifampin, Biaxin and Ethambutol   No smoking since April 2021   Not needing to use the rescue inhaler- Albuterol         Past Medical History:   Diagnosis Date    Acid fast bacillus 09/21/2016    sputum - QuantiFERON-TB Gold is negative - DNA probe negative for TB    Aneurysm ([address]) 2010    brain    Cancer ([address]) 1999    lung    Cavitary lesion of lung     Hyperlipidemia     Hypertension     Pneumonia     Pulmonary nodules        Past Surgical History:        Procedure Laterality Date    BRONCHOSCOPY  09/21/2016    BRONCHOSCOPY N/A 6/28/2022    [employer] ROBOT (7:00) NO LABS performed by Laura Hale MD at [address] [place] [address]  6/28/2022    BRONCHOSCOPY performed by Laura Hale MD at [address] [place] [address]  6/28/2022    BRONCHOSCOPY/TRANSBRONCHIAL NEEDLE BIOPSY ROBOTIC performed by Laura Hale MD at [address] [place] [address]  6/28/2022    BRONCHOSCOPY BIOPSY BRONCHUS ROBOTIC performed by Laura Hale MD at [address] [place] [address]  6/28/2022    BRONCHOSCOPY ALVEOLAR LAVAGE ROBOTIC performed by Laura Hale MD at [facility]  2012    [facility]    right ring finger    [facility]    lobe removed right    NOSE SURGERY  1964    deviated septum    OTHER SURGICAL HISTORY  2010    cerebral aneurysm wire       Allergies:  is allergic to ace inhibitors. Social History:    TOBACCO:   reports that he has been smoking cigarettes. He has a 12.50 pack-year smoking history.  He has never

## 2023-11-02 ENCOUNTER — OFFICE VISIT (OUTPATIENT)
Dept: INFECTIOUS DISEASES | Age: 78
End: 2023-11-02

## 2023-11-02 VITALS
HEART RATE: 66 BPM | TEMPERATURE: 97.5 F | DIASTOLIC BLOOD PRESSURE: 80 MMHG | HEIGHT: 72 IN | BODY MASS INDEX: 21.13 KG/M2 | SYSTOLIC BLOOD PRESSURE: 162 MMHG | OXYGEN SATURATION: 100 % | WEIGHT: 156 LBS

## 2023-11-02 DIAGNOSIS — R06.02 SOB (SHORTNESS OF BREATH) ON EXERTION: ICD-10-CM

## 2023-11-02 DIAGNOSIS — R91.1 LUNG NODULE: ICD-10-CM

## 2023-11-02 DIAGNOSIS — J98.4 CAVITARY LUNG DISEASE: Primary | ICD-10-CM

## 2023-11-02 DIAGNOSIS — Z51.81 THERAPEUTIC DRUG MONITORING: ICD-10-CM

## 2023-11-02 DIAGNOSIS — Z03.89 ENCOUNTER FOR OBSERVATION FOR OTHER SUSPECTED DISEASES AND CONDITIONS RULED OUT: ICD-10-CM

## 2023-11-02 DIAGNOSIS — Z90.2 HISTORY OF LOBECTOMY OF LUNG: ICD-10-CM

## 2023-11-02 DIAGNOSIS — Z71.89 ENCOUNTER FOR MEDICATION COUNSELING: ICD-10-CM

## 2023-11-02 DIAGNOSIS — Z85.118 HISTORY OF LUNG CANCER: ICD-10-CM

## 2023-11-02 DIAGNOSIS — A31.8 MYCOBACTERIUM ABSCESSUS INFECTION: ICD-10-CM

## 2023-11-02 DIAGNOSIS — A31.0 MAI (MYCOBACTERIUM AVIUM-INTRACELLULARE) INFECTION (HCC): ICD-10-CM

## 2023-11-02 DIAGNOSIS — R91.1 PULMONARY NODULE: ICD-10-CM

## 2023-11-02 RX ORDER — ETHAMBUTOL HYDROCHLORIDE 400 MG/1
1200 TABLET, FILM COATED ORAL DAILY
Qty: 270 TABLET | Refills: 1 | Status: SHIPPED | OUTPATIENT
Start: 2023-11-02 | End: 2024-04-30

## 2023-11-02 RX ORDER — RIFAMPIN 300 MG/1
300 CAPSULE ORAL 2 TIMES DAILY
Qty: 180 CAPSULE | Refills: 1 | Status: SHIPPED | OUTPATIENT
Start: 2023-11-02 | End: 2024-04-30

## 2023-11-02 RX ORDER — CLARITHROMYCIN 500 MG/1
500 TABLET, COATED ORAL 2 TIMES DAILY
Qty: 180 TABLET | Refills: 1 | Status: SHIPPED | OUTPATIENT
Start: 2023-11-02 | End: 2024-04-30

## 2023-11-02 ASSESSMENT — ENCOUNTER SYMPTOMS
NAUSEA: 0
DIARRHEA: 0
SHORTNESS OF BREATH: 1
CONSTIPATION: 0
SINUS PAIN: 0
SINUS PRESSURE: 0
RHINORRHEA: 0
BACK PAIN: 0
ABDOMINAL PAIN: 0
EYE REDNESS: 0
COUGH: 0
EYE DISCHARGE: 0
SORE THROAT: 0
WHEEZING: 0

## 2024-02-28 DIAGNOSIS — J98.4 CAVITARY LUNG DISEASE: ICD-10-CM

## 2024-02-29 RX ORDER — ALBUTEROL SULFATE 90 UG/1
2 AEROSOL, METERED RESPIRATORY (INHALATION) EVERY 6 HOURS PRN
Qty: 6.7 EACH | Refills: 1 | Status: SHIPPED | OUTPATIENT
Start: 2024-02-29

## 2024-03-05 ENCOUNTER — HOSPITAL ENCOUNTER (OUTPATIENT)
Dept: CARDIOLOGY | Age: 79
Discharge: HOME | End: 2024-03-05
Attending: INTERNAL MEDICINE
Payer: MEDICARE

## 2024-03-05 DIAGNOSIS — R06.02 SOB (SHORTNESS OF BREATH) ON EXERTION: ICD-10-CM

## 2024-03-05 PROCEDURE — 93306 TTE W/DOPPLER COMPLETE: CPT

## 2024-03-13 ENCOUNTER — HOSPITAL ENCOUNTER (OUTPATIENT)
Age: 79
Discharge: HOME | End: 2024-03-13
Payer: MEDICARE

## 2024-03-13 ENCOUNTER — TELEPHONE (OUTPATIENT)
Dept: INFECTIOUS DISEASES | Age: 79
End: 2024-03-13

## 2024-03-13 DIAGNOSIS — R91.1 LUNG NODULE: ICD-10-CM

## 2024-03-13 DIAGNOSIS — J98.4 CAVITARY LUNG DISEASE: ICD-10-CM

## 2024-03-13 DIAGNOSIS — Z03.89 ENCOUNTER FOR OBSERVATION FOR OTHER SUSPECTED DISEASES AND CONDITIONS RULED OUT: ICD-10-CM

## 2024-03-13 LAB
ALBUMIN SERPL-MCNC: 4.5 G/DL (ref 3.4–5)
ALBUMIN/GLOB SERPL: 2 {RATIO} (ref 1.1–2.2)
ALP SERPL-CCNC: 52 U/L (ref 40–129)
ALT SERPL-CCNC: 21 U/L (ref 10–40)
ANION GAP SERPL CALCULATED.3IONS-SCNC: 6 MMOL/L (ref 3–16)
AST SERPL-CCNC: 20 U/L (ref 15–37)
BASOPHILS # BLD: 0 K/UL (ref 0–0.2)
BASOPHILS NFR BLD: 0.4 %
BILIRUB SERPL-MCNC: 0.4 MG/DL (ref 0–1)
BUN SERPL-MCNC: 24 MG/DL (ref 7–20)
CALCIUM SERPL-MCNC: 9.2 MG/DL (ref 8.3–10.6)
CHLORIDE SERPL-SCNC: 101 MMOL/L (ref 99–110)
CO2 SERPL-SCNC: 32 MMOL/L (ref 21–32)
CREAT SERPL-MCNC: 1.1 MG/DL (ref 0.8–1.3)
DEPRECATED RDW RBC AUTO: 13.8 % (ref 12.4–15.4)
EOSINOPHIL # BLD: 0.2 K/UL (ref 0–0.6)
EOSINOPHIL NFR BLD: 1.9 %
GFR SERPLBLD CREATININE-BSD FMLA CKD-EPI: >60 ML/MIN/{1.73_M2}
GLUCOSE SERPL-MCNC: 112 MG/DL (ref 70–99)
HCT VFR BLD AUTO: 39.6 % (ref 40.5–52.5)
HGB BLD-MCNC: 14.3 G/DL (ref 13.5–17.5)
LYMPHOCYTES # BLD: 4.3 K/UL (ref 1–5.1)
LYMPHOCYTES NFR BLD: 39 %
MCH RBC QN AUTO: 33 PG (ref 26–34)
MCHC RBC AUTO-ENTMCNC: 36.1 G/DL (ref 31–36)
MCV RBC AUTO: 91.4 FL (ref 80–100)
MONOCYTES # BLD: 0.7 K/UL (ref 0–1.3)
MONOCYTES NFR BLD: 5.9 %
NEUTROPHILS # BLD: 5.9 K/UL (ref 1.7–7.7)
NEUTROPHILS NFR BLD: 52.8 %
PLATELET # BLD AUTO: 242 K/UL (ref 135–450)
PLATELET BLD QL SMEAR: ADEQUATE
PMV BLD AUTO: 8.1 FL (ref 5–10.5)
POTASSIUM SERPL-SCNC: 4.5 MMOL/L (ref 3.5–5.1)
PROT SERPL-MCNC: 6.8 G/DL (ref 6.4–8.2)
RBC # BLD AUTO: 4.34 M/UL (ref 4.2–5.9)
SLIDE REVIEW: ABNORMAL
SODIUM SERPL-SCNC: 139 MMOL/L (ref 136–145)
WBC # BLD AUTO: 11.2 K/UL (ref 4–11)

## 2024-03-13 PROCEDURE — 85025 COMPLETE CBC W/AUTO DIFF WBC: CPT

## 2024-03-13 PROCEDURE — 80053 COMPREHEN METABOLIC PANEL: CPT

## 2024-03-13 PROCEDURE — 86698 HISTOPLASMA ANTIBODY: CPT

## 2024-03-13 PROCEDURE — 87385 HISTOPLASMA CAPSUL AG IA: CPT

## 2024-03-13 PROCEDURE — 86606 ASPERGILLUS ANTIBODY: CPT

## 2024-03-13 PROCEDURE — 36415 COLL VENOUS BLD VENIPUNCTURE: CPT

## 2024-03-13 PROCEDURE — 86612 BLASTOMYCES ANTIBODY: CPT

## 2024-03-13 PROCEDURE — 86635 COCCIDIOIDES ANTIBODY: CPT

## 2024-03-13 NOTE — TELEPHONE ENCOUNTER
----- Message from Wayne Wiley MD sent at 3/13/2024  2:19 PM EDT -----  Mildly WBC and BUN elevation.  Recommend improved oral hydration.  Other labs are okay.

## 2024-03-14 LAB
H CAPSUL AG UR IA-ACNC: NOT DETECTED NG/ML
H CAPSUL AG UR QL IA: NOT DETECTED

## 2024-03-15 LAB
H CAPSUL AG SER IA-ACNC: NOT DETECTED
H CAPSUL AG SER QL IA: NOT DETECTED

## 2024-03-16 LAB
ASPERGILLUS AB TITR SER CF: NORMAL {TITER}
B DERMAT AB SER-ACNC: 0.2 IV
COCCIDIOIDES AB TITR SER CF: NORMAL {TITER}
H CAPSUL MYC AB TITR SER CF: NORMAL {TITER}
H CAPSUL YST AB TITR SER CF: NORMAL {TITER}

## 2024-03-18 ENCOUNTER — HOSPITAL ENCOUNTER (OUTPATIENT)
Age: 79
Discharge: HOME | End: 2024-03-18
Payer: MEDICARE

## 2024-03-18 LAB
ASPERGILLUS AB SER QL ID: NOT DETECTED
B DERMAT AB SER QL ID: NOT DETECTED
CHOLEST SERPL-MCNC: 156 MG/DL (ref 0–199)
COCCIDIOIDES AB SPEC QL ID: NOT DETECTED
EST. AVERAGE GLUCOSE BLD GHB EST-MCNC: 85.3 MG/DL
H CAPSUL AB TITR SER ID: NOT DETECTED {TITER}
HBA1C MFR BLD: 4.6 %
HDLC SERPL-MCNC: 71 MG/DL (ref 40–60)
LDLC SERPL CALC-MCNC: 76 MG/DL
T4 FREE SERPL-MCNC: 1 NG/DL (ref 0.9–1.8)
TRIGL SERPL-MCNC: 46 MG/DL (ref 0–150)
TSH SERPL DL<=0.005 MIU/L-ACNC: 2.89 UIU/ML (ref 0.27–4.2)
VLDLC SERPL CALC-MCNC: 9 MG/DL

## 2024-03-18 PROCEDURE — 36415 COLL VENOUS BLD VENIPUNCTURE: CPT

## 2024-03-18 PROCEDURE — 84443 ASSAY THYROID STIM HORMONE: CPT

## 2024-03-18 PROCEDURE — 80061 LIPID PANEL: CPT

## 2024-03-18 PROCEDURE — 84439 ASSAY OF FREE THYROXINE: CPT

## 2024-03-18 PROCEDURE — 83036 HEMOGLOBIN GLYCOSYLATED A1C: CPT

## 2024-05-01 ENCOUNTER — HOSPITAL ENCOUNTER (OUTPATIENT)
Dept: CT IMAGING | Age: 79
Discharge: HOME | End: 2024-05-01
Payer: MEDICARE

## 2024-05-01 ENCOUNTER — HOSPITAL ENCOUNTER (OUTPATIENT)
Age: 79
End: 2024-05-01
Payer: MEDICARE

## 2024-05-01 DIAGNOSIS — J98.4 CAVITARY LUNG DISEASE: ICD-10-CM

## 2024-05-01 DIAGNOSIS — A31.8 MYCOBACTERIUM ABSCESSUS IDENTIFIED ON DIAGNOSTIC TESTING: ICD-10-CM

## 2024-05-01 PROCEDURE — 71250 CT THORAX DX C-: CPT

## 2024-05-02 ENCOUNTER — HOSPITAL ENCOUNTER (OUTPATIENT)
Age: 79
Discharge: HOME | End: 2024-05-02
Payer: MEDICARE

## 2024-05-02 ENCOUNTER — OFFICE VISIT (OUTPATIENT)
Dept: PULMONOLOGY | Age: 79
End: 2024-05-02
Payer: MEDICARE

## 2024-05-02 VITALS
HEIGHT: 72 IN | DIASTOLIC BLOOD PRESSURE: 80 MMHG | BODY MASS INDEX: 20.72 KG/M2 | SYSTOLIC BLOOD PRESSURE: 150 MMHG | OXYGEN SATURATION: 92 % | HEART RATE: 60 BPM | WEIGHT: 153 LBS

## 2024-05-02 DIAGNOSIS — Z87.891 HISTORY OF TOBACCO ABUSE: ICD-10-CM

## 2024-05-02 DIAGNOSIS — J43.9 PULMONARY EMPHYSEMA, UNSPECIFIED EMPHYSEMA TYPE (HCC): ICD-10-CM

## 2024-05-02 DIAGNOSIS — J47.9 BRONCHIECTASIS WITHOUT COMPLICATION (HCC): Primary | ICD-10-CM

## 2024-05-02 LAB
ALBUMIN SERPL-MCNC: 4.2 G/DL (ref 3.4–5)
ALBUMIN/GLOB SERPL: 1.8 {RATIO} (ref 1.1–2.2)
ALP SERPL-CCNC: 50 U/L (ref 40–129)
ALT SERPL-CCNC: 18 U/L (ref 10–40)
ANION GAP SERPL CALCULATED.3IONS-SCNC: 5 MMOL/L (ref 3–16)
AST SERPL-CCNC: 18 U/L (ref 15–37)
BASOPHILS # BLD: 0.1 K/UL (ref 0–0.2)
BASOPHILS NFR BLD: 1.3 %
BILIRUB SERPL-MCNC: 0.5 MG/DL (ref 0–1)
BUN SERPL-MCNC: 18 MG/DL (ref 7–20)
CALCIUM SERPL-MCNC: 9.1 MG/DL (ref 8.3–10.6)
CHLORIDE SERPL-SCNC: 103 MMOL/L (ref 99–110)
CO2 SERPL-SCNC: 32 MMOL/L (ref 21–32)
CREAT SERPL-MCNC: 1.1 MG/DL (ref 0.8–1.3)
DEPRECATED RDW RBC AUTO: 13.4 % (ref 12.4–15.4)
EOSINOPHIL # BLD: 0.2 K/UL (ref 0–0.6)
EOSINOPHIL NFR BLD: 1.7 %
GFR SERPLBLD CREATININE-BSD FMLA CKD-EPI: 69 ML/MIN/{1.73_M2}
GLUCOSE SERPL-MCNC: 116 MG/DL (ref 70–99)
HCT VFR BLD AUTO: 41.4 % (ref 40.5–52.5)
HGB BLD-MCNC: 14.8 G/DL (ref 13.5–17.5)
LYMPHOCYTES # BLD: 3.6 K/UL (ref 1–5.1)
LYMPHOCYTES NFR BLD: 40.2 %
MCH RBC QN AUTO: 33 PG (ref 26–34)
MCHC RBC AUTO-ENTMCNC: 35.8 G/DL (ref 31–36)
MCV RBC AUTO: 92.1 FL (ref 80–100)
MONOCYTES # BLD: 0.5 K/UL (ref 0–1.3)
MONOCYTES NFR BLD: 5.1 %
NEUTROPHILS # BLD: 4.7 K/UL (ref 1.7–7.7)
NEUTROPHILS NFR BLD: 51.7 %
PLATELET # BLD AUTO: 198 K/UL (ref 135–450)
PMV BLD AUTO: 8.1 FL (ref 5–10.5)
POTASSIUM SERPL-SCNC: 4.3 MMOL/L (ref 3.5–5.1)
PROT SERPL-MCNC: 6.6 G/DL (ref 6.4–8.2)
RBC # BLD AUTO: 4.5 M/UL (ref 4.2–5.9)
SODIUM SERPL-SCNC: 140 MMOL/L (ref 136–145)
WBC # BLD AUTO: 9 K/UL (ref 4–11)

## 2024-05-02 PROCEDURE — 85025 COMPLETE CBC W/AUTO DIFF WBC: CPT

## 2024-05-02 PROCEDURE — 1123F ACP DISCUSS/DSCN MKR DOCD: CPT | Performed by: INTERNAL MEDICINE

## 2024-05-02 PROCEDURE — 99214 OFFICE O/P EST MOD 30 MIN: CPT | Performed by: INTERNAL MEDICINE

## 2024-05-02 PROCEDURE — 80053 COMPREHEN METABOLIC PANEL: CPT

## 2024-05-02 PROCEDURE — 36415 COLL VENOUS BLD VENIPUNCTURE: CPT

## 2024-05-02 RX ORDER — TIOTROPIUM BROMIDE INHALATION SPRAY 1.56 UG/1
2 SPRAY, METERED RESPIRATORY (INHALATION) DAILY
Qty: 12 G | Refills: 1 | Status: SHIPPED | OUTPATIENT
Start: 2024-05-02

## 2024-05-02 NOTE — PROGRESS NOTES
cells, pulmonary macrophages, mixed leukocytes (lymphocytes, neutrophils) are present  Washing:No malignant cells identified. Benign bronchial cells, pulmonary macrophages and mixed leukocytes  (lymphocytes, neutrophils) are present. GMS stain is negative for fungal and pneumocystis jirovecii organisms.  Bronchial Lavage: No malignant cells identified Benign bronchial cells, pulmonary macrophages and mixed leukocytes (lymphocytes, neutrophils) are present. GMS stain is negative for fungal and pneumocystis jirovecii  Biopsies from right lower lobe:Negative for malignancy.Negative for granulomatous inflammation. Congestion with hemosiderin-laden macrophages. Negative AFB and GMS stain     Histoplasma Ab Mycelial CF <1:8 <1:8     Histoplasma Ab Yeast CF <1:8 1:8 (H)      Histoplasma Ag Interp Not Detected Not Detected       Bronch 6/28/23   A. Lung, right upper lobe nodule, endobronchial ultrasound guided fine   needle aspiration:   - No malignant cells identified.     B. Bronchial alveolar lavage:   - No malignant cells identified.     C. Bronchial washings:   - No malignant cells identified.   Right upper lobe nodule, transbronchial biopsies:   - Fragments of benign bronchial wall with chronic inflammation.   - Negative for granulomas.   - Negative for malignancy.   - Grocott methenamine silver and Ziehl-Neelsen stains are negative for   fungal elements and acid-fast bacilli, respectively.     COMMENT:    Although the special stains for microorganisms are negative,   correlation with the results of the pending microbiology cultures is   recommended.      BUCCA/BUCCA   BAL Mycobacterium abscessus complex Abnormal    Wash M. avium-intracellulare complex Abnormal        Bronchwash 9/21/2016 Mycobacterium xenopi  BronchBAL   09/21/2016 Negative  Sputum AFB 09/23/2016 Mycobacterium xenopi  Sputum AFB 09/24/2016 Negative    Sputum AFB 09/25/2016 Negative   Sputum AFB 01/18/2017 Positive    Sputum AFB 03/16/2017 Negative

## 2024-05-06 NOTE — TELEPHONE ENCOUNTER
Pt went to  spiriva from the pharmacy and it was over $300 for 1 month. I advised him to check with his insurance to see what they will cover that might be less expensive. He is looking for alternative from Dr. Holman.

## 2024-05-09 ENCOUNTER — OFFICE VISIT (OUTPATIENT)
Dept: INFECTIOUS DISEASES | Age: 79
End: 2024-05-09
Payer: MEDICARE

## 2024-05-09 VITALS
HEIGHT: 71 IN | TEMPERATURE: 98.1 F | OXYGEN SATURATION: 94 % | BODY MASS INDEX: 21.7 KG/M2 | SYSTOLIC BLOOD PRESSURE: 164 MMHG | HEART RATE: 70 BPM | DIASTOLIC BLOOD PRESSURE: 94 MMHG | WEIGHT: 155 LBS

## 2024-05-09 DIAGNOSIS — A31.0 MAI (MYCOBACTERIUM AVIUM-INTRACELLULARE) INFECTION (HCC): ICD-10-CM

## 2024-05-09 DIAGNOSIS — J98.4 CAVITARY LUNG DISEASE: Primary | ICD-10-CM

## 2024-05-09 DIAGNOSIS — A31.8 MYCOBACTERIUM ABSCESSUS INFECTION: ICD-10-CM

## 2024-05-09 DIAGNOSIS — Z51.81 THERAPEUTIC DRUG MONITORING: ICD-10-CM

## 2024-05-09 DIAGNOSIS — J44.9 CHRONIC OBSTRUCTIVE PULMONARY DISEASE, UNSPECIFIED COPD TYPE (HCC): ICD-10-CM

## 2024-05-09 DIAGNOSIS — R91.1 LUNG NODULE: ICD-10-CM

## 2024-05-09 DIAGNOSIS — Z85.118 HISTORY OF LUNG CANCER: ICD-10-CM

## 2024-05-09 DIAGNOSIS — Z90.2 HISTORY OF LOBECTOMY OF LUNG: ICD-10-CM

## 2024-05-09 PROCEDURE — 99214 OFFICE O/P EST MOD 30 MIN: CPT | Performed by: INTERNAL MEDICINE

## 2024-05-09 PROCEDURE — 1123F ACP DISCUSS/DSCN MKR DOCD: CPT | Performed by: INTERNAL MEDICINE

## 2024-05-09 ASSESSMENT — ENCOUNTER SYMPTOMS
CONSTIPATION: 0
RHINORRHEA: 0
NAUSEA: 0
WHEEZING: 0
DIARRHEA: 0
EYE DISCHARGE: 0
BACK PAIN: 0
SHORTNESS OF BREATH: 0
SORE THROAT: 0
SINUS PAIN: 0
SINUS PRESSURE: 0
ABDOMINAL PAIN: 0
COUGH: 0
EYE REDNESS: 0

## 2024-05-09 NOTE — PROGRESS NOTES
30mcg/0.3mL  02/25/2021   Second Dose COVID-19, PFIZER PURPLE top, DILUTE for use, (age 12 y+), 30mcg/0.3mL   03/18/2021       Last COVID Lab No results found for: \"SARS-COV-2\", \"SARS-COV-2 RNA\", \"SARS-COV-2 RNA, RT PCR\", \"SARS-COV-2, DEVIKA\", \"SARS-COV-2, NAAT\", \"SARS-COV-2 BY PCR\", \"POC\", \"SARS-COV-2, POC\", \"RAPID\", \"SARS-COV-2, RAPID\", \"SALIVA\", \"SARS-COV-2, SALIVA\"         IMPRESSION:    The patient is a 78 y.o. 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 was seen today for following problems:    1. Cavitary lung disease    2. Chronic obstructive pulmonary disease, unspecified COPD type ([facility])    3. SOPHIE (mycobacterium avium-intracellulare) infection ([facility])    4. History of lung cancer    5. Lung nodule    6. History of lobectomy of lung    7. Therapeutic drug monitoring    8. 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  Serum creatinine was 1.1.    His white cell count was 9000 with hemoglobin of 14.8 and platelet count of 198,000.  His AST was 18, ALT was 18 and serum bilirubin was 0.5.    I had ordered fungal serologies and urine and serum histoplasma antigen that was done on 3/13/2024.  They came back negative.    His last 2D echo was done on 3/5/2024.  It showed ejection fraction of 55 to 60% with normal left ventricular diastolic function.    I had ordered a CT scan of the chest that was done without contrast on 5/1/2024.  I reviewed the CT images today and compared it with the prior CT scan from October 20, 2023 and 11/7/2022.  No new lesions noted in the lungs.    I obtained a detailed interval history from the patient today.  The patient

## 2024-05-28 RX ORDER — RIFAMPIN 300 MG/1
300 CAPSULE ORAL 2 TIMES DAILY
Qty: 180 CAPSULE | OUTPATIENT
Start: 2024-05-28

## 2024-07-12 DIAGNOSIS — J98.4 CAVITARY LUNG DISEASE: ICD-10-CM

## 2024-07-18 RX ORDER — ALBUTEROL SULFATE 90 UG/1
2 AEROSOL, METERED RESPIRATORY (INHALATION) EVERY 6 HOURS PRN
Qty: 8.5 EACH | Refills: 3 | Status: SHIPPED | OUTPATIENT
Start: 2024-07-18

## 2024-10-22 ENCOUNTER — HOSPITAL ENCOUNTER (INPATIENT)
Age: 79
LOS: 4 days | Discharge: HOME | DRG: 871 | End: 2024-10-26
Attending: EMERGENCY MEDICINE | Admitting: INTERNAL MEDICINE
Payer: MEDICARE

## 2024-10-22 ENCOUNTER — APPOINTMENT (OUTPATIENT)
Dept: CT IMAGING | Age: 79
DRG: 871 | End: 2024-10-22
Payer: MEDICARE

## 2024-10-22 ENCOUNTER — APPOINTMENT (OUTPATIENT)
Dept: GENERAL RADIOLOGY | Age: 79
DRG: 871 | End: 2024-10-22
Payer: MEDICARE

## 2024-10-22 DIAGNOSIS — R65.20 SEVERE SEPSIS (HCC): ICD-10-CM

## 2024-10-22 DIAGNOSIS — J98.4 CAVITATING MASS IN LEFT UPPER LUNG LOBE: ICD-10-CM

## 2024-10-22 DIAGNOSIS — J18.9 LINGULAR PNEUMONIA: Primary | ICD-10-CM

## 2024-10-22 DIAGNOSIS — A41.9 SEVERE SEPSIS (HCC): ICD-10-CM

## 2024-10-22 LAB
ALBUMIN SERPL-MCNC: 3.6 G/DL (ref 3.4–5)
ALBUMIN/GLOB SERPL: 1.1 {RATIO} (ref 1.1–2.2)
ALP SERPL-CCNC: 127 U/L (ref 40–129)
ALT SERPL-CCNC: 26 U/L (ref 10–40)
AMORPH SED URNS QL MICRO: NORMAL /HPF
ANION GAP SERPL CALCULATED.3IONS-SCNC: 14 MMOL/L (ref 3–16)
AST SERPL-CCNC: 32 U/L (ref 15–37)
BASE EXCESS BLDV CALC-SCNC: 0.9 MMOL/L (ref -3–3)
BASOPHILS # BLD: 0 K/UL (ref 0–0.2)
BASOPHILS NFR BLD: 0.2 %
BILIRUB SERPL-MCNC: 1.1 MG/DL (ref 0–1)
BILIRUB UR QL STRIP.AUTO: NEGATIVE
BUN SERPL-MCNC: 41 MG/DL (ref 7–20)
CALCIUM SERPL-MCNC: 9.5 MG/DL (ref 8.3–10.6)
CHLORIDE SERPL-SCNC: 98 MMOL/L (ref 99–110)
CK SERPL-CCNC: 59 U/L (ref 39–308)
CLARITY UR: CLEAR
CO2 BLDV-SCNC: 29 MMOL/L
CO2 SERPL-SCNC: 28 MMOL/L (ref 21–32)
COHGB MFR BLDV: 3.5 % (ref 0–1.5)
COLOR UR: ABNORMAL
CREAT SERPL-MCNC: 1.7 MG/DL (ref 0.8–1.3)
DEPRECATED RDW RBC AUTO: 13.6 % (ref 12.4–15.4)
EOSINOPHIL # BLD: 0 K/UL (ref 0–0.6)
EOSINOPHIL NFR BLD: 0 %
EPI CELLS #/AREA URNS HPF: NORMAL /HPF (ref 0–5)
FLUAV RNA RESP QL NAA+PROBE: NOT DETECTED
FLUBV RNA RESP QL NAA+PROBE: NOT DETECTED
GFR SERPLBLD CREATININE-BSD FMLA CKD-EPI: 41 ML/MIN/{1.73_M2}
GLUCOSE SERPL-MCNC: 138 MG/DL (ref 70–99)
GLUCOSE UR STRIP.AUTO-MCNC: NEGATIVE MG/DL
HCO3 BLDV-SCNC: 27 MMOL/L (ref 23–29)
HCT VFR BLD AUTO: 39.1 % (ref 40.5–52.5)
HGB BLD-MCNC: 13.7 G/DL (ref 13.5–17.5)
HGB UR QL STRIP.AUTO: ABNORMAL
KETONES UR STRIP.AUTO-MCNC: NEGATIVE MG/DL
LACTATE BLDV-SCNC: 1.4 MMOL/L (ref 0.4–2)
LACTATE BLDV-SCNC: 2.1 MMOL/L (ref 0.4–2)
LEUKOCYTE ESTERASE UR QL STRIP.AUTO: NEGATIVE
LYMPHOCYTES # BLD: 1.7 K/UL (ref 1–5.1)
LYMPHOCYTES NFR BLD: 9.3 %
MAGNESIUM SERPL-MCNC: 2.2 MG/DL (ref 1.8–2.4)
MCH RBC QN AUTO: 32 PG (ref 26–34)
MCHC RBC AUTO-ENTMCNC: 35.2 G/DL (ref 31–36)
MCV RBC AUTO: 91.1 FL (ref 80–100)
METHGB MFR BLDV: 0.3 %
MONOCYTES # BLD: 1.5 K/UL (ref 0–1.3)
MONOCYTES NFR BLD: 8.7 %
NEUTROPHILS # BLD: 14.6 K/UL (ref 1.7–7.7)
NEUTROPHILS NFR BLD: 81.8 %
NITRITE UR QL STRIP.AUTO: NEGATIVE
NT-PROBNP SERPL-MCNC: 1795 PG/ML (ref 0–449)
O2 THERAPY: ABNORMAL
PCO2 BLDV: 48.5 MMHG (ref 40–50)
PH BLDV: 7.36 [PH] (ref 7.35–7.45)
PH UR STRIP.AUTO: 6 [PH] (ref 5–8)
PLATELET # BLD AUTO: 230 K/UL (ref 135–450)
PMV BLD AUTO: 8 FL (ref 5–10.5)
PO2 BLDV: 29.3 MMHG (ref 25–40)
POTASSIUM SERPL-SCNC: 3.9 MMOL/L (ref 3.5–5.1)
PROT SERPL-MCNC: 7 G/DL (ref 6.4–8.2)
PROT UR STRIP.AUTO-MCNC: 30 MG/DL
RBC # BLD AUTO: 4.29 M/UL (ref 4.2–5.9)
RBC #/AREA URNS HPF: NORMAL /HPF (ref 0–4)
RSV AG NOSE QL: NEGATIVE
SAO2 % BLDV: 54 %
SARS-COV-2 RNA RESP QL NAA+PROBE: NOT DETECTED
SODIUM SERPL-SCNC: 140 MMOL/L (ref 136–145)
SP GR UR STRIP.AUTO: 1.02 (ref 1–1.03)
TROPONIN, HIGH SENSITIVITY: 60 NG/L (ref 0–22)
TROPONIN, HIGH SENSITIVITY: 61 NG/L (ref 0–22)
UA COMPLETE W REFLEX CULTURE PNL UR: ABNORMAL
UA DIPSTICK W REFLEX MICRO PNL UR: YES
URN SPEC COLLECT METH UR: ABNORMAL
UROBILINOGEN UR STRIP-ACNC: 0.2 E.U./DL
WBC # BLD AUTO: 17.9 K/UL (ref 4–11)
WBC #/AREA URNS HPF: NORMAL /HPF (ref 0–5)

## 2024-10-22 PROCEDURE — 86698 HISTOPLASMA ANTIBODY: CPT

## 2024-10-22 PROCEDURE — 74177 CT ABD & PELVIS W/CONTRAST: CPT

## 2024-10-22 PROCEDURE — 93005 ELECTROCARDIOGRAM TRACING: CPT | Performed by: EMERGENCY MEDICINE

## 2024-10-22 PROCEDURE — 87807 RSV ASSAY W/OPTIC: CPT

## 2024-10-22 PROCEDURE — 94640 AIRWAY INHALATION TREATMENT: CPT

## 2024-10-22 PROCEDURE — 6360000002 HC RX W HCPCS: Performed by: EMERGENCY MEDICINE

## 2024-10-22 PROCEDURE — 87040 BLOOD CULTURE FOR BACTERIA: CPT

## 2024-10-22 PROCEDURE — 6360000002 HC RX W HCPCS: Performed by: INTERNAL MEDICINE

## 2024-10-22 PROCEDURE — 83735 ASSAY OF MAGNESIUM: CPT

## 2024-10-22 PROCEDURE — 6360000004 HC RX CONTRAST MEDICATION: Performed by: EMERGENCY MEDICINE

## 2024-10-22 PROCEDURE — 87385 HISTOPLASMA CAPSUL AG IA: CPT

## 2024-10-22 PROCEDURE — 6370000000 HC RX 637 (ALT 250 FOR IP): Performed by: EMERGENCY MEDICINE

## 2024-10-22 PROCEDURE — 2060000000 HC ICU INTERMEDIATE R&B

## 2024-10-22 PROCEDURE — 2580000003 HC RX 258: Performed by: INTERNAL MEDICINE

## 2024-10-22 PROCEDURE — 86635 COCCIDIOIDES ANTIBODY: CPT

## 2024-10-22 PROCEDURE — 82550 ASSAY OF CK (CPK): CPT

## 2024-10-22 PROCEDURE — 81001 URINALYSIS AUTO W/SCOPE: CPT

## 2024-10-22 PROCEDURE — 84484 ASSAY OF TROPONIN QUANT: CPT

## 2024-10-22 PROCEDURE — 85025 COMPLETE CBC W/AUTO DIFF WBC: CPT

## 2024-10-22 PROCEDURE — 1200000000 HC SEMI PRIVATE

## 2024-10-22 PROCEDURE — 83605 ASSAY OF LACTIC ACID: CPT

## 2024-10-22 PROCEDURE — 82803 BLOOD GASES ANY COMBINATION: CPT

## 2024-10-22 PROCEDURE — 87636 SARSCOV2 & INF A&B AMP PRB: CPT

## 2024-10-22 PROCEDURE — 36415 COLL VENOUS BLD VENIPUNCTURE: CPT

## 2024-10-22 PROCEDURE — 71260 CT THORAX DX C+: CPT

## 2024-10-22 PROCEDURE — 86606 ASPERGILLUS ANTIBODY: CPT

## 2024-10-22 PROCEDURE — 99285 EMERGENCY DEPT VISIT HI MDM: CPT

## 2024-10-22 PROCEDURE — 82784 ASSAY IGA/IGD/IGG/IGM EACH: CPT

## 2024-10-22 PROCEDURE — 2580000003 HC RX 258: Performed by: EMERGENCY MEDICINE

## 2024-10-22 PROCEDURE — 80053 COMPREHEN METABOLIC PANEL: CPT

## 2024-10-22 PROCEDURE — 83880 ASSAY OF NATRIURETIC PEPTIDE: CPT

## 2024-10-22 PROCEDURE — 96360 HYDRATION IV INFUSION INIT: CPT

## 2024-10-22 PROCEDURE — 71045 X-RAY EXAM CHEST 1 VIEW: CPT

## 2024-10-22 PROCEDURE — 86612 BLASTOMYCES ANTIBODY: CPT

## 2024-10-22 RX ORDER — ONDANSETRON 4 MG/1
4 TABLET, ORALLY DISINTEGRATING ORAL EVERY 8 HOURS PRN
Status: DISCONTINUED | OUTPATIENT
Start: 2024-10-22 | End: 2024-10-26 | Stop reason: HOSPADM

## 2024-10-22 RX ORDER — POLYETHYLENE GLYCOL 3350 17 G/17G
17 POWDER, FOR SOLUTION ORAL DAILY PRN
Status: DISCONTINUED | OUTPATIENT
Start: 2024-10-22 | End: 2024-10-26 | Stop reason: HOSPADM

## 2024-10-22 RX ORDER — ROSUVASTATIN CALCIUM 10 MG/1
10 TABLET, COATED ORAL DAILY
Status: DISCONTINUED | OUTPATIENT
Start: 2024-10-23 | End: 2024-10-26 | Stop reason: HOSPADM

## 2024-10-22 RX ORDER — LOSARTAN POTASSIUM 100 MG/1
100 TABLET ORAL DAILY
Status: DISCONTINUED | OUTPATIENT
Start: 2024-10-23 | End: 2024-10-26 | Stop reason: HOSPADM

## 2024-10-22 RX ORDER — ENOXAPARIN SODIUM 100 MG/ML
40 INJECTION SUBCUTANEOUS DAILY
Status: DISCONTINUED | OUTPATIENT
Start: 2024-10-22 | End: 2024-10-26 | Stop reason: HOSPADM

## 2024-10-22 RX ORDER — SODIUM CHLORIDE 9 MG/ML
INJECTION, SOLUTION INTRAVENOUS CONTINUOUS
Status: ACTIVE | OUTPATIENT
Start: 2024-10-22 | End: 2024-10-23

## 2024-10-22 RX ORDER — SODIUM CHLORIDE 0.9 % (FLUSH) 0.9 %
5-40 SYRINGE (ML) INJECTION PRN
Status: DISCONTINUED | OUTPATIENT
Start: 2024-10-22 | End: 2024-10-26 | Stop reason: HOSPADM

## 2024-10-22 RX ORDER — ONDANSETRON 2 MG/ML
4 INJECTION INTRAMUSCULAR; INTRAVENOUS EVERY 6 HOURS PRN
Status: DISCONTINUED | OUTPATIENT
Start: 2024-10-22 | End: 2024-10-26 | Stop reason: HOSPADM

## 2024-10-22 RX ORDER — PRAVASTATIN SODIUM 40 MG
40 TABLET ORAL DAILY
Status: DISCONTINUED | OUTPATIENT
Start: 2024-10-22 | End: 2024-10-22 | Stop reason: SDUPTHER

## 2024-10-22 RX ORDER — SODIUM CHLORIDE 0.9 % (FLUSH) 0.9 %
5-40 SYRINGE (ML) INJECTION EVERY 12 HOURS SCHEDULED
Status: DISCONTINUED | OUTPATIENT
Start: 2024-10-22 | End: 2024-10-26 | Stop reason: HOSPADM

## 2024-10-22 RX ORDER — IOPAMIDOL 755 MG/ML
75 INJECTION, SOLUTION INTRAVASCULAR
Status: COMPLETED | OUTPATIENT
Start: 2024-10-22 | End: 2024-10-22

## 2024-10-22 RX ORDER — ASPIRIN 325 MG
325 TABLET ORAL DAILY
Status: DISCONTINUED | OUTPATIENT
Start: 2024-10-23 | End: 2024-10-26 | Stop reason: HOSPADM

## 2024-10-22 RX ORDER — ACETAMINOPHEN 650 MG/1
650 SUPPOSITORY RECTAL EVERY 6 HOURS PRN
Status: DISCONTINUED | OUTPATIENT
Start: 2024-10-22 | End: 2024-10-26 | Stop reason: HOSPADM

## 2024-10-22 RX ORDER — SODIUM CHLORIDE 9 MG/ML
INJECTION, SOLUTION INTRAVENOUS PRN
Status: DISCONTINUED | OUTPATIENT
Start: 2024-10-22 | End: 2024-10-26 | Stop reason: HOSPADM

## 2024-10-22 RX ORDER — IPRATROPIUM BROMIDE AND ALBUTEROL SULFATE 2.5; .5 MG/3ML; MG/3ML
1 SOLUTION RESPIRATORY (INHALATION)
Status: DISCONTINUED | OUTPATIENT
Start: 2024-10-23 | End: 2024-10-23

## 2024-10-22 RX ORDER — 0.9 % SODIUM CHLORIDE 0.9 %
1000 INTRAVENOUS SOLUTION INTRAVENOUS ONCE
Status: COMPLETED | OUTPATIENT
Start: 2024-10-22 | End: 2024-10-22

## 2024-10-22 RX ORDER — IPRATROPIUM BROMIDE AND ALBUTEROL SULFATE 2.5; .5 MG/3ML; MG/3ML
1 SOLUTION RESPIRATORY (INHALATION) ONCE
Status: COMPLETED | OUTPATIENT
Start: 2024-10-22 | End: 2024-10-22

## 2024-10-22 RX ORDER — AZITHROMYCIN 250 MG/1
250 TABLET, FILM COATED ORAL DAILY
Status: DISCONTINUED | OUTPATIENT
Start: 2024-10-23 | End: 2024-10-23

## 2024-10-22 RX ORDER — ACETAMINOPHEN 325 MG/1
650 TABLET ORAL EVERY 6 HOURS PRN
Status: DISCONTINUED | OUTPATIENT
Start: 2024-10-22 | End: 2024-10-26 | Stop reason: HOSPADM

## 2024-10-22 RX ORDER — HYDROCHLOROTHIAZIDE 25 MG/1
25 TABLET ORAL DAILY
Status: DISCONTINUED | OUTPATIENT
Start: 2024-10-23 | End: 2024-10-26 | Stop reason: HOSPADM

## 2024-10-22 RX ADMIN — CEFTRIAXONE SODIUM 1000 MG: 1 INJECTION, POWDER, FOR SOLUTION INTRAMUSCULAR; INTRAVENOUS at 18:55

## 2024-10-22 RX ADMIN — SODIUM CHLORIDE: 9 INJECTION, SOLUTION INTRAVENOUS at 20:53

## 2024-10-22 RX ADMIN — SODIUM CHLORIDE, PRESERVATIVE FREE 10 ML: 5 INJECTION INTRAVENOUS at 20:54

## 2024-10-22 RX ADMIN — IOPAMIDOL 75 ML: 755 INJECTION, SOLUTION INTRAVENOUS at 16:38

## 2024-10-22 RX ADMIN — AZITHROMYCIN MONOHYDRATE 500 MG: 500 INJECTION, POWDER, LYOPHILIZED, FOR SOLUTION INTRAVENOUS at 19:43

## 2024-10-22 RX ADMIN — IPRATROPIUM BROMIDE AND ALBUTEROL SULFATE 1 DOSE: 2.5; .5 SOLUTION RESPIRATORY (INHALATION) at 16:44

## 2024-10-22 RX ADMIN — SODIUM CHLORIDE 1000 ML: 9 INJECTION, SOLUTION INTRAVENOUS at 17:02

## 2024-10-22 RX ADMIN — ENOXAPARIN SODIUM 40 MG: 100 INJECTION SUBCUTANEOUS at 20:54

## 2024-10-22 ASSESSMENT — PAIN SCALES - GENERAL: PAINLEVEL_OUTOF10: 0

## 2024-10-22 NOTE — PROGRESS NOTES
10/22/24 1644   Treatment   Treatment Type N   $Treatment Type $Inhaled Therapy/Meds   Medications Albuterol/Ipratropium   Pre-Tx Pulse 107   Pre-Tx Resps 18   Breath Sounds Pre-Tx MONTY Diminished   Breath Sounds Pre-Tx LLL Diminished   Breath Sounds Pre-Tx RUL Diminished   Breath Sounds Pre-Tx RML Diminished   Breath Sounds Pre-Tx RLL Diminished   Breath Sounds Post-Tx MONTY Diminished   Breath Sounds Post-Tx LLL Diminished   Breath Sounds Post-Tx RUL Diminished   Breath Sounds Post-Tx RML Diminished   Breath Sounds Post-Tx RLL Diminished   Post-Tx Pulse 108   Post-Tx Resps 18   Delivery Source Air;Mouthpiece   Position Semi-Russell's   Treatment Tolerance Well   Is patient on O2? N   Oxygen Therapy/Pulse Ox   O2 Therapy Room air   Pulse (!) 108   Respirations 18   SpO2 95 %        Name band;

## 2024-10-22 NOTE — ED PROVIDER NOTES
(no reported falls).   Neurological:  Positive for weakness (generalized), light-headedness and headaches (mild). Negative for syncope.   Psychiatric/Behavioral:  The patient is nervous/anxious.          Positives and Pertinent negatives as per HPI.      PASTMEDICAL HISTORY     Past Medical History:   Diagnosis Date    Acid fast bacillus 09/21/2016    sputum - QuantiFERON-TB Gold is negative - DNA probe negative for TB    Aneurysm ([facility]) 2010    brain    Cancer ([facility]) 1999    lung    Cavitary lesion of lung     Hyperlipidemia     Hypertension     Pneumonia     Pulmonary nodules          SURGICAL HISTORY       Past Surgical History:   Procedure Laterality Date    BRONCHOSCOPY  09/21/2016    BRONCHOSCOPY N/A 6/28/2022    [employer] ROBOT (7:00) NO LABS performed by Greyson Kerr MD at [facility] ENDOSCOPY    BRONCHOSCOPY  6/28/2022    BRONCHOSCOPY performed by Greyson Kerr MD at [facility] ENDOSCOPY    BRONCHOSCOPY  6/28/2022    BRONCHOSCOPY/TRANSBRONCHIAL NEEDLE BIOPSY ROBOTIC performed by Greyson Kerr MD at [facility] ENDOSCOPY    BRONCHOSCOPY  6/28/2022    BRONCHOSCOPY BIOPSY BRONCHUS ROBOTIC performed by Greyson Kerr MD at [facility] ENDOSCOPY    BRONCHOSCOPY  6/28/2022    BRONCHOSCOPY ALVEOLAR LAVAGE ROBOTIC performed by Greyson Kerr MD at [facility] ENDOSCOPY    BRONCHOSCOPY N/A 10/24/2024    BRONCHOSCOPY ALVEOLAR LAVAGE performed by Sandoval Salomon MD at [facility] ENDOSCOPY    BRONCHOSCOPY  10/24/2024    BRONCHOSCOPY DIAGNOSTIC OR CELL WASH ONLY performed by Sandoval Salomon MD at [facility] ENDOSCOPY    COLONOSCOPY  2012    FINGER SURGERY  1964    right ring finger    HERNIA REPAIR  1946 1947    LUNG CANCER SURGERY  1999    lobe removed right    NOSE SURGERY  1964    deviated septum    OTHER SURGICAL HISTORY  2010    cerebral aneurysm wire         CURRENT MEDICATIONS       Current Discharge Medication List        CONTINUE these medications which have NOT CHANGED    Details   albuterol sulfate HFA  Regular rhythm. Tachycardia present.      Pulses: Normal pulses.   Pulmonary:      Effort: Tachypnea and respiratory distress (mild) present. No bradypnea, accessory muscle usage, prolonged expiration or retractions. He is not intubated.      Breath sounds: Normal air entry. No stridor. Examination of the right-upper field reveals wheezing and rhonchi. Examination of the left-upper field reveals wheezing and rhonchi. Examination of the right-middle field reveals wheezing and rhonchi. Examination of the left-middle field reveals wheezing and rhonchi. Examination of the right-lower field reveals wheezing and rhonchi. Examination of the left-lower field reveals wheezing and rhonchi. Wheezing and rhonchi present. No decreased breath sounds or rales.   Chest:      Chest wall: No tenderness.   Abdominal:      General: Abdomen is flat. Bowel sounds are normal. There is no distension.      Palpations: Abdomen is soft.      Tenderness: There is no abdominal tenderness. There is no guarding or rebound.   Musculoskeletal:         General: No tenderness, deformity or signs of injury. Normal range of motion.      Cervical back: Full passive range of motion without pain, normal range of motion and neck supple. No edema, erythema, signs of trauma, rigidity, torticollis or crepitus. No pain with movement, spinous process tenderness or muscular tenderness. Normal range of motion.      Right lower leg: No tenderness. No edema.      Left lower leg: No tenderness. No edema.   Skin:     General: Skin is warm and dry.      Coloration: Skin is not cyanotic, jaundiced or pale.      Findings: No ecchymosis, erythema or rash.   Neurological:      General: No focal deficit present.      Mental Status: He is alert and oriented to person, place, and time. Mental status is at baseline.      GCS: GCS eye subscore is 4. GCS verbal subscore is 5. GCS motor subscore is 6.      Sensory: Sensation is intact. No sensory deficit.      Motor: Motor

## 2024-10-23 ENCOUNTER — ANESTHESIA EVENT (OUTPATIENT)
Dept: ENDOSCOPY | Age: 79
End: 2024-10-23
Payer: MEDICARE

## 2024-10-23 PROBLEM — J47.0 BRONCHIECTASIS WITH ACUTE LOWER RESPIRATORY INFECTION (HCC): Status: ACTIVE | Noted: 2024-10-23

## 2024-10-23 LAB
ALBUMIN SERPL-MCNC: 2.8 G/DL (ref 3.4–5)
ANION GAP SERPL CALCULATED.3IONS-SCNC: 12 MMOL/L (ref 3–16)
BUN SERPL-MCNC: 32 MG/DL (ref 7–20)
CALCIUM SERPL-MCNC: 7.9 MG/DL (ref 8.3–10.6)
CHLORIDE SERPL-SCNC: 101 MMOL/L (ref 99–110)
CO2 SERPL-SCNC: 25 MMOL/L (ref 21–32)
CREAT SERPL-MCNC: 1.5 MG/DL (ref 0.8–1.3)
DEPRECATED RDW RBC AUTO: 13.5 % (ref 12.4–15.4)
EKG ATRIAL RATE: 110 BPM
EKG DIAGNOSIS: NORMAL
EKG P AXIS: 92 DEGREES
EKG P-R INTERVAL: 140 MS
EKG Q-T INTERVAL: 334 MS
EKG QRS DURATION: 102 MS
EKG QTC CALCULATION (BAZETT): 452 MS
EKG R AXIS: 38 DEGREES
EKG T AXIS: 90 DEGREES
EKG VENTRICULAR RATE: 110 BPM
GFR SERPLBLD CREATININE-BSD FMLA CKD-EPI: 47 ML/MIN/{1.73_M2}
GLUCOSE SERPL-MCNC: 150 MG/DL (ref 70–99)
HCT VFR BLD AUTO: 33.5 % (ref 40.5–52.5)
HGB BLD-MCNC: 11.8 G/DL (ref 13.5–17.5)
IGG SERPL-MCNC: 694 MG/DL (ref 700–1600)
MAGNESIUM SERPL-MCNC: 1.98 MG/DL (ref 1.8–2.4)
MCH RBC QN AUTO: 31.9 PG (ref 26–34)
MCHC RBC AUTO-ENTMCNC: 35.1 G/DL (ref 31–36)
MCV RBC AUTO: 90.9 FL (ref 80–100)
PHOSPHATE SERPL-MCNC: 2.9 MG/DL (ref 2.5–4.9)
PLATELET # BLD AUTO: 205 K/UL (ref 135–450)
PMV BLD AUTO: 7.9 FL (ref 5–10.5)
POTASSIUM SERPL-SCNC: 3.5 MMOL/L (ref 3.5–5.1)
RBC # BLD AUTO: 3.69 M/UL (ref 4.2–5.9)
SODIUM SERPL-SCNC: 138 MMOL/L (ref 136–145)
WBC # BLD AUTO: 13.7 K/UL (ref 4–11)

## 2024-10-23 PROCEDURE — 2700000000 HC OXYGEN THERAPY PER DAY

## 2024-10-23 PROCEDURE — 99223 1ST HOSP IP/OBS HIGH 75: CPT | Performed by: INTERNAL MEDICINE

## 2024-10-23 PROCEDURE — 87116 MYCOBACTERIA CULTURE: CPT

## 2024-10-23 PROCEDURE — 87633 RESP VIRUS 12-25 TARGETS: CPT

## 2024-10-23 PROCEDURE — 85027 COMPLETE CBC AUTOMATED: CPT

## 2024-10-23 PROCEDURE — 99221 1ST HOSP IP/OBS SF/LOW 40: CPT | Performed by: INTERNAL MEDICINE

## 2024-10-23 PROCEDURE — 2580000003 HC RX 258: Performed by: INTERNAL MEDICINE

## 2024-10-23 PROCEDURE — 6370000000 HC RX 637 (ALT 250 FOR IP): Performed by: INTERNAL MEDICINE

## 2024-10-23 PROCEDURE — 87385 HISTOPLASMA CAPSUL AG IA: CPT

## 2024-10-23 PROCEDURE — 87186 SC STD MICRODIL/AGAR DIL: CPT

## 2024-10-23 PROCEDURE — 1200000000 HC SEMI PRIVATE

## 2024-10-23 PROCEDURE — 87077 CULTURE AEROBIC IDENTIFY: CPT

## 2024-10-23 PROCEDURE — 93010 ELECTROCARDIOGRAM REPORT: CPT | Performed by: INTERNAL MEDICINE

## 2024-10-23 PROCEDURE — 87206 SMEAR FLUORESCENT/ACID STAI: CPT

## 2024-10-23 PROCEDURE — 94761 N-INVAS EAR/PLS OXIMETRY MLT: CPT

## 2024-10-23 PROCEDURE — 87556 M.TUBERCULO DNA AMP PROBE: CPT

## 2024-10-23 PROCEDURE — 36415 COLL VENOUS BLD VENIPUNCTURE: CPT

## 2024-10-23 PROCEDURE — 87641 MR-STAPH DNA AMP PROBE: CPT

## 2024-10-23 PROCEDURE — 94640 AIRWAY INHALATION TREATMENT: CPT

## 2024-10-23 PROCEDURE — 87449 NOS EACH ORGANISM AG IA: CPT

## 2024-10-23 PROCEDURE — 87205 SMEAR GRAM STAIN: CPT

## 2024-10-23 PROCEDURE — 87070 CULTURE OTHR SPECIMN AEROBIC: CPT

## 2024-10-23 PROCEDURE — 6360000002 HC RX W HCPCS: Performed by: INTERNAL MEDICINE

## 2024-10-23 PROCEDURE — 80069 RENAL FUNCTION PANEL: CPT

## 2024-10-23 PROCEDURE — 83735 ASSAY OF MAGNESIUM: CPT

## 2024-10-23 PROCEDURE — 87798 DETECT AGENT NOS DNA AMP: CPT

## 2024-10-23 RX ORDER — ALBUTEROL SULFATE 90 UG/1
2 INHALANT RESPIRATORY (INHALATION)
Status: DISCONTINUED | OUTPATIENT
Start: 2024-10-23 | End: 2024-10-26 | Stop reason: HOSPADM

## 2024-10-23 RX ORDER — DOXYCYCLINE HYCLATE 100 MG
100 TABLET ORAL EVERY 12 HOURS SCHEDULED
Status: DISCONTINUED | OUTPATIENT
Start: 2024-10-23 | End: 2024-10-25

## 2024-10-23 RX ADMIN — HYDROCHLOROTHIAZIDE 25 MG: 25 TABLET ORAL at 08:26

## 2024-10-23 RX ADMIN — LOSARTAN POTASSIUM 100 MG: 100 TABLET, FILM COATED ORAL at 08:25

## 2024-10-23 RX ADMIN — CEFTRIAXONE SODIUM 1000 MG: 1 INJECTION, POWDER, FOR SOLUTION INTRAMUSCULAR; INTRAVENOUS at 18:31

## 2024-10-23 RX ADMIN — ACETAMINOPHEN 650 MG: 325 TABLET ORAL at 04:48

## 2024-10-23 RX ADMIN — ENOXAPARIN SODIUM 40 MG: 100 INJECTION SUBCUTANEOUS at 08:26

## 2024-10-23 RX ADMIN — TIOTROPIUM BROMIDE INHALATION SPRAY 1 PUFF: 3.12 SPRAY, METERED RESPIRATORY (INHALATION) at 07:50

## 2024-10-23 RX ADMIN — IPRATROPIUM BROMIDE AND ALBUTEROL SULFATE 1 DOSE: 2.5; .5 SOLUTION RESPIRATORY (INHALATION) at 07:50

## 2024-10-23 RX ADMIN — SODIUM CHLORIDE, PRESERVATIVE FREE 10 ML: 5 INJECTION INTRAVENOUS at 08:27

## 2024-10-23 RX ADMIN — DOXYCYCLINE HYCLATE 100 MG: 100 TABLET, COATED ORAL at 20:38

## 2024-10-23 RX ADMIN — Medication 2 PUFF: at 19:20

## 2024-10-23 RX ADMIN — ASPIRIN 325 MG: 325 TABLET ORAL at 08:26

## 2024-10-23 RX ADMIN — SODIUM CHLORIDE, PRESERVATIVE FREE 10 ML: 5 INJECTION INTRAVENOUS at 20:39

## 2024-10-23 RX ADMIN — IPRATROPIUM BROMIDE AND ALBUTEROL SULFATE 1 DOSE: 2.5; .5 SOLUTION RESPIRATORY (INHALATION) at 11:26

## 2024-10-23 ASSESSMENT — PAIN SCALES - GENERAL
PAINLEVEL_OUTOF10: 0
PAINLEVEL_OUTOF10: 2

## 2024-10-23 ASSESSMENT — PAIN DESCRIPTION - ORIENTATION: ORIENTATION: MID

## 2024-10-23 ASSESSMENT — PAIN DESCRIPTION - DESCRIPTORS: DESCRIPTORS: ACHING;OTHER (COMMENT)

## 2024-10-23 ASSESSMENT — PAIN - FUNCTIONAL ASSESSMENT: PAIN_FUNCTIONAL_ASSESSMENT: ACTIVITIES ARE NOT PREVENTED

## 2024-10-23 ASSESSMENT — PAIN DESCRIPTION - LOCATION: LOCATION: HEAD

## 2024-10-23 NOTE — RT PROTOCOL NOTE
RT Inhaler-Nebulizer Bronchodilator Protocol Note    There is a bronchodilator order in the chart from a provider indicating to follow the RT Bronchodilator Protocol and there is an “Initiate RT Inhaler-Nebulizer Bronchodilator Protocol” order as well (see protocol at bottom of note).    CXR Findings:  XR CHEST PORTABLE    Result Date: 10/22/2024  Chronic obstructive lung changes with hazy interstitial and ground-glass opacities throughout the left lung which is more apparent and could be due to atypical pulmonary edema vs multisegmental bronchopneumonia.  Recommend short-term follow-up. Postop changes right hemithorax with parenchymal scarring along the lung base which is unchanged Tiny bibasilar pleural effusions       The findings from the last RT Protocol Assessment were as follows:   History Pulmonary Disease: Chronic pulmonary disease  Respiratory Pattern: Regular pattern and RR 12-20 bpm  Breath Sounds: Inspiratory and expiratory or bilateral wheezing and/or rhonchi  Cough: Strong, spontaneous, non-productive  Indication for Bronchodilator Therapy: On home bronchodilators, Wheezing associated with pulm disorder  Bronchodilator Assessment Score: 8    Aerosolized bronchodilator medication orders have been revised according to the RT Inhaler-Nebulizer Bronchodilator Protocol below.    Respiratory Therapist to perform RT Therapy Protocol Assessment initially then follow the protocol.  Repeat RT Therapy Protocol Assessment PRN for score 0-3 or on second treatment, BID, and PRN for scores above 3.    No Indications - adjust the frequency to every 6 hours PRN wheezing or bronchospasm, if no treatments needed after 48 hours then discontinue using Per Protocol order mode.     If indication present, adjust the RT bronchodilator orders based on the Bronchodilator Assessment Score as indicated below.  Use Inhaler orders unless patient has one or more of the following: on home nebulizer, not able to hold breath for 10

## 2024-10-23 NOTE — PLAN OF CARE
Problem: Safety - Adult  Goal: Free from fall injury  10/23/2024 1231 by Melissa Pina, RN  Outcome: Progressing  Flowsheets (Taken 10/23/2024 1231)  Free From Fall Injury: Instruct family/caregiver on patient safety  10/23/2024 0046 by Princess Yadav RN  Outcome: Progressing  Note: Pt will remain free from falls throughout hospital stay. Fall precautions in place, bed alarm on, bed in lowest position with wheels locked and side rails 2/4 up. Room door open and hourly rounding completed. Will continue to monitor throughout shift.      Problem: Pain  Goal: Verbalizes/displays adequate comfort level or baseline comfort level  10/23/2024 1231 by Melissa Pina, RN  Outcome: Progressing  Flowsheets (Taken 10/23/2024 1231)  Verbalizes/displays adequate comfort level or baseline comfort level: Assess pain using appropriate pain scale  10/23/2024 0046 by Princess Yadav RN  Outcome: Progressing  Note: Pt will be satisfied with pain control. Pt uses numeric pain rating scale with reassessments after pain med administration. Will continue to monitor progression throughout shift.

## 2024-10-23 NOTE — PROGRESS NOTES
Patient admitted to room 208 from ER.  Patient oriented to room, call light, bed rails, phone, lights and bathroom.  Patient instructed about the schedule of the day including: vital sign frequency, lab draws, possible tests, frequency of MD and staff rounds, including RN/MD rounding together at bedside, daily weights, and I &O's.  Patient instructed about prescribed diet, how to use 8MENU, and television.   bed alarm in place, patient aware of placement and reason.   Telemetry box  in place, patient aware of placement and reason.  Bed locked, in lowest position, side rails up 2/4, call light within reach.

## 2024-10-23 NOTE — PROGRESS NOTES
Emphysema Neg Hx      REVIEW OF SYSTEMS:    CONSTITUTIONAL:  negative for fevers, chills, diaphoresis, activity change, appetite change, fatigue, night sweats and unexpected weight change.   EYES:  negative for blurred vision, eye discharge, visual disturbance and icterus  HEENT:  negative for hearing loss, tinnitus, ear drainage, sinus pressure, nasal congestion, epistaxis and snoring  RESPIRATORY:  See HPI  CARDIOVASCULAR:  negative for chest pain, palpitations, exertional chest pressure/discomfort, edema, syncope  GASTROINTESTINAL:  negative for nausea, vomiting, diarrhea, constipation, blood in stool and abdominal pain  GENITOURINARY:  negative for frequency, dysuria, urinary incontinence, decreased urine volume, and hematuria  HEMATOLOGIC/LYMPHATIC:  negative for easy bruising, bleeding and lymphadenopathy  ALLERGIC/IMMUNOLOGIC:  negative for recurrent infections, angioedema, anaphylaxis and drug reactions  ENDOCRINE:  negative for weight changes and diabetic symptoms including polyuria, polydipsia and polyphagia  MUSCULOSKELETAL:  negative for  pain, joint swelling, decreased range of motion and muscle weakness  NEUROLOGICAL:  negative for headaches, slurred speech, unilateral weakness  PSYCHIATRIC/BEHAVIORAL: negative for hallucinations, behavioral problems, confusion and agitation.     Objective:   PHYSICAL EXAM:      VITALS:  /70   Pulse 93   Temp 97.5 °F (36.4 °C) (Oral)   Resp 20   Ht 1.803 m (5' 11\")   Wt 68.9 kg (151 lb 12.8 oz)   SpO2 99%   BMI 21.17 kg/m²      24HR INTAKE/OUTPUT:    Intake/Output Summary (Last 24 hours) at 10/23/2024 144  Last data filed at 10/23/2024 0914  Gross per 24 hour   Intake 820 ml   Output --   Net 820 ml     CONSTITUTIONAL:  awake, alert, cooperative, no apparent distress, and appears stated age  NECK:  Supple, symmetrical, trachea midline, no adenopathy, thyroid symmetric, not enlarged and no tenderness, skin normal  LUNGS: Generally diminished breath  sounds.  Congestion/crackles in the left upper lobe are noted anteriorly  CARDIOVASCULAR: S1 and S2, no edema and no JVD  ABDOMEN:  normal bowel sounds, non-distended and no masses palpated, and no tenderness to palpation. No hepatospleenomegaly  LYMPHADENOPATHY:  no axillary or supraclavicular adenopathy. No cervical adnenopathy  PSYCHIATRIC: Oriented to person place and time. No obvious depression or anxiety.  MUSCULOSKELETAL: No obvious misalignment or effusion of the joints. No clubbing, cyanosis of the digits.  SKIN:  normal skin color, texture, turgor and no redness, warmth, or swelling. No palpable nodules    DATA:    Old records have been reviewed    CBC:  Recent Labs     10/22/24  1535 10/23/24  0431   WBC 17.9* 13.7*   RBC 4.29 3.69*   HGB 13.7 11.8*   HCT 39.1* 33.5*    205   MCV 91.1 90.9   MCH 32.0 31.9   MCHC 35.2 35.1   RDW 13.6 13.5      BMP:  Recent Labs     10/22/24  1535 10/23/24  0431    138   K 3.9 3.5   CL 98* 101   CO2 28 25   BUN 41* 32*   CREATININE 1.7* 1.5*   CALCIUM 9.5 7.9*   GLUCOSE 138* 150*      ABG:  No results for input(s): \"PHART\", \"VMO6ECO\", \"PO2ART\", \"XBA9AIT\", \"B0CBDJQX\", \"BEART\" in the last 72 hours.  Procalcitonin  No results for input(s): \"PROCAL\" in the last 72 hours.    No results found for: \"BNP\"  Lab Results   Component Value Date    CKTOTAL 59 10/22/2024           Radiology Review:  All pertinent images / reports were reviewed as a part of this visit.     Assessment:     Left upper lobe pneumonia  Bronchiectasis  Centrilobular emphysema  History of nontuberculous Mycobacterium      Plan:     I reviewed CT imaging which reveals new left upper lobe infiltrate superimposed on a background of emphysema and bronchiectasis.  The infiltrate is new compared to imaging from 5/24.  He has been afebrile since admission.  He does, however, have a leukocytosis.  He is on ceftriaxone and doxycycline.  He is also receiving DuoNeb which she feels like has been helpful.  He  2.57

## 2024-10-23 NOTE — CONSULTS
Infectious Diseases   Consult Note      Reason for Consult:  cavitary lung disease and history of pulmonary NTM infections   Requesting Physician:  Russell       Date of Admission: 10/22/2024    Subjective:   CHIEF COMPLAINT:  none given       HPI:    Tyler Reveles is a 79yoM with history of HTN, HLD, CAD              History of R lung cancer in 1999 s/p resection     In 4/2016, he was found to have enlarging, PET avid R apical cavitary lesion     Diagnosed with M xenopi infection in 9/2016.  Multiple cultures with growth of this organism.    He was treated with clarithromycin, rifampin and ethambutol from 11/2016 to 3/2019, corresponding to extension for ~12 months after negative sputum AFB.    Bronchwash 9/21/2016 Mycobacterium xenopi  BronchBAL   09/21/2016 Negative  Sputum AFB 09/23/2016 Mycobacterium xenopi  Sputum AFB 09/24/2016 Negative    Sputum AFB 09/25/2016 Negative   Sputum AFB 01/18/2017 Positive    Sputum AFB 03/16/2017 Negative   Sputum AFB 04/17/2017 Negative   Sputum AFB 05/13/2017 Negative   Sputum AFB 06/13/2017 Positive  Sputum AFB 07/18/2017 Negative   Sputum AFB 08/15/2017 Negative   Sputum AFB 09/15/2017 Negative   Sputum AFB 11/06/2017 Negative   Sputum AFB 12/19/2017 Positive Mycobacterium Avium (speciation was questioned)  Sputum AFB 02/14/2018 Negative  Sputum AFB 03/16/2018 Negative   Sputum AFB 05/15/2018 Negative   Sputum AFB 06/19/2018 Negative   Sputum AFB 07/16/2018 Negative     Routine annual Pulm visit with screening CT completed in 6/2022.  At that time, CT showed new R apical cavitary lesion with adjacent nodules.    He had a diagnostic bronch 6/2022  3 BAL AFB samples were sent.  One was negative, one with M abscessus and 1 with SOPHIE.     Referred back to ID 8/2022 and started daily rifampin, ethambutol, clarithromycin  These continued through 6/2024.  He did not have any additional sputum AFB testing during or following treatment.  CT scan 5/2024 showed stable R apical cavitary lung

## 2024-10-23 NOTE — PROGRESS NOTES
Consult Placed     Who: Nsaim Salomon  Date:10/23  Time: 1255     Electronically signed by Melissa Pina RN on 10/23/2024 at 12:55 PM

## 2024-10-23 NOTE — PROGRESS NOTES
Hospital Medicine Progress Note  V10/16      Date of Admission: 10/22/2024  Hospital Day: 2    Chief Admission Complaint:  SOB     Subjective:  EMR and notes reviewed pt seen and examined. Lying in bed souse at bedside. Reports cont to feel exhausted and weak. No chest pain, having some sob. Denies pain, poor PO intake, \" I feel hungry but just can't take more than a bite. Feels chills. Also c/o burning eyes and dryness     Presenting Admission History:       79 y.o. male w/ remote hx Lung CA and Non-TB AFB infection who presented to [facility] with a 4-5 day hx of progressive SOB/GARIBAY that started on/about Thurs 17 October w/ associated generalized malaise/fatigue. Sxs so severe he had to cancel singing practice as well as Golf outing.      The patient denies any fever/chills or other signs/sxs of systemic illness or identifiable aggravating/alleviating factors\"    Assessment/Plan:      Current Principal Problem:  Acute pneumonia    PNA - likely Gram Positive Community Acquired Pneumonia, possibly due to Strep Pneumonia.  MONTY cavitary findings on admission CT scan.  Patient w/ hx non-TB AFB infection 2016.  Infectious Disease consulted from ED and appreciated.   Started on empiric Rocephin/Azithro in ED on 22 October - continued and changed to DAILY dosing.    - 10/23 WBC improving 17.9> 13.7/ ID notes reviewed, will follow. Pulm consult was placed for possible diagnostic/ therapeutic bronch. Holding off on Macrolide, cont Rocephin and ID added doxy     Acute Resp Failure with hypoxia in the setting of above, sat drop 88% requiring supplemental O2, cont to monitor and wean as tolerated      Sepsis - w/ Leukocytosis/Tachycardia/Elevated Lactate POArrival 2nd to above infection.  Continue IVF as appropriate and monitor clinical response w/ ABX as ordered.       ARF - w/ elevated BUN/Cr ratio likely secondary to pre-renal azotemia.  Followed serial BUN/Creatinine on IVF x 1L hydration personally reviewed and

## 2024-10-23 NOTE — ED NOTES
Tyler Reveles is a 79 y.o. male admitted for  Principal Problem:    Acute pneumonia  Resolved Problems:    * No resolved hospital problems. *  .   Patient Home via family with   Chief Complaint   Patient presents with    Shortness of Breath     Pt to ED for c/o SOB and increased fatigue since last Thursday.    .  Patient is alert and Person, Place, Time, and Situation  Patient's baseline mobility: Baseline Mobility: Independent   Code Status: No Order   Cardiac Rhythm:       Is patient on baseline Oxygen: no how many Liters:   Abnormal Assessment Findings: SOB, tachycardia    Isolation: None      NIH Score:    C-SSRS: Risk of Suicide: No Risk  Bedside swallow:        Active LDA's:   Peripheral IV 10/22/24 Left Antecubital (Active)   Site Assessment Clean, dry & intact 10/22/24 1532   Line Status Blood return noted 10/22/24 1532   Phlebitis Assessment No symptoms 10/22/24 1532   Infiltration Assessment 0 10/22/24 1532     Patient admitted with a de jesus: no If the de jesus is chronic was it exchanged:NA  Reason for de jesus:   Patient admitted with Central Line:  . PICC line placement confirmed: YES OR NO:[id number]}   Reason for Central line:   Was central line Inserted from an outside facility: no       Family/Caregiver Present wife went home, will be returning Any Concerns: no   Restraints no  Sitter no         Vitals: MEWS Score: 4    Vitals:    10/22/24 1646 10/22/24 1746 10/22/24 1909 10/22/24 1915   BP: (!) 143/74 138/65  139/67   Pulse: (!) 108 (!) 114 98 (!) 104   Resp: 25 27 28 27   Temp:       TempSrc:       SpO2: 99% 96% 92% 92%   Weight:       Height:           Last documented pain score (0-10 scale) Pain Level: 0  Pain medication administered No.    Pertinent or High Risk Medications/Drips: No.    Pending Blood Product Administration: no    Abnormal labs:   Abnormal Labs Reviewed   CBC WITH AUTO DIFFERENTIAL - Abnormal; Notable for the following components:       Result Value    WBC 17.9 (*)     Hematocrit 39.1 (*)

## 2024-10-23 NOTE — PLAN OF CARE
Problem: Discharge Planning  Goal: Discharge to home or other facility with appropriate resources  Outcome: Progressing     Problem: Safety - Adult  Goal: Free from fall injury  Outcome: Progressing  Note: Pt will remain free from falls throughout hospital stay. Fall precautions in place, bed alarm on, bed in lowest position with wheels locked and side rails 2/4 up. Room door open and hourly rounding completed. Will continue to monitor throughout shift.      Problem: Pain  Goal: Verbalizes/displays adequate comfort level or baseline comfort level  Outcome: Progressing  Note: Pt will be satisfied with pain control. Pt uses numeric pain rating scale with reassessments after pain med administration. Will continue to monitor progression throughout shift.

## 2024-10-24 ENCOUNTER — ANESTHESIA (OUTPATIENT)
Dept: ENDOSCOPY | Age: 79
End: 2024-10-24
Payer: MEDICARE

## 2024-10-24 LAB
ANION GAP SERPL CALCULATED.3IONS-SCNC: 12 MMOL/L (ref 3–16)
BUN SERPL-MCNC: 23 MG/DL (ref 7–20)
CALCIUM SERPL-MCNC: 8.4 MG/DL (ref 8.3–10.6)
CHLORIDE SERPL-SCNC: 100 MMOL/L (ref 99–110)
CO2 SERPL-SCNC: 26 MMOL/L (ref 21–32)
CREAT SERPL-MCNC: 1.2 MG/DL (ref 0.8–1.3)
DEPRECATED RDW RBC AUTO: 13.6 % (ref 12.4–15.4)
EKG ATRIAL RATE: 104 BPM
EKG DIAGNOSIS: NORMAL
EKG P AXIS: 92 DEGREES
EKG P-R INTERVAL: 132 MS
EKG Q-T INTERVAL: 352 MS
EKG QRS DURATION: 98 MS
EKG QTC CALCULATION (BAZETT): 462 MS
EKG R AXIS: 32 DEGREES
EKG T AXIS: 81 DEGREES
EKG VENTRICULAR RATE: 104 BPM
GFR SERPLBLD CREATININE-BSD FMLA CKD-EPI: 62 ML/MIN/{1.73_M2}
GLUCOSE SERPL-MCNC: 133 MG/DL (ref 70–99)
HCT VFR BLD AUTO: 34.2 % (ref 40.5–52.5)
HGB BLD-MCNC: 11.8 G/DL (ref 13.5–17.5)
LEGIONELLA AG UR QL: NORMAL
MCH RBC QN AUTO: 31.7 PG (ref 26–34)
MCHC RBC AUTO-ENTMCNC: 34.7 G/DL (ref 31–36)
MCV RBC AUTO: 91.3 FL (ref 80–100)
MRSA DNA SPEC QL NAA+PROBE: NORMAL
NT-PROBNP SERPL-MCNC: 2086 PG/ML (ref 0–449)
ORGANISM: ABNORMAL
ORGANISM: ABNORMAL
PLATELET # BLD AUTO: 228 K/UL (ref 135–450)
PMV BLD AUTO: 7.5 FL (ref 5–10.5)
POTASSIUM SERPL-SCNC: 3.7 MMOL/L (ref 3.5–5.1)
RBC # BLD AUTO: 3.74 M/UL (ref 4.2–5.9)
REASON FOR REJECTION: NORMAL
REJECTED TEST: NORMAL
REPORT: NORMAL
RESP PATH DNA+RNA PNL L RESP NAA+NON-PRB: ABNORMAL
RESP PATH DNA+RNA PNL L RESP NAA+NON-PRB: ABNORMAL
S PNEUM AG UR QL: NORMAL
SODIUM SERPL-SCNC: 138 MMOL/L (ref 136–145)
TROPONIN, HIGH SENSITIVITY: 43 NG/L (ref 0–22)
WBC # BLD AUTO: 16 K/UL (ref 4–11)

## 2024-10-24 PROCEDURE — 87106 FUNGI IDENTIFICATION YEAST: CPT

## 2024-10-24 PROCEDURE — 7100000000 HC PACU RECOVERY - FIRST 15 MIN: Performed by: INTERNAL MEDICINE

## 2024-10-24 PROCEDURE — 87206 SMEAR FLUORESCENT/ACID STAI: CPT

## 2024-10-24 PROCEDURE — 80048 BASIC METABOLIC PNL TOTAL CA: CPT

## 2024-10-24 PROCEDURE — 3700000001 HC ADD 15 MINUTES (ANESTHESIA): Performed by: INTERNAL MEDICINE

## 2024-10-24 PROCEDURE — 7100000011 HC PHASE II RECOVERY - ADDTL 15 MIN: Performed by: INTERNAL MEDICINE

## 2024-10-24 PROCEDURE — 94761 N-INVAS EAR/PLS OXIMETRY MLT: CPT

## 2024-10-24 PROCEDURE — 6360000002 HC RX W HCPCS: Performed by: NURSE ANESTHETIST, CERTIFIED REGISTERED

## 2024-10-24 PROCEDURE — 2700000000 HC OXYGEN THERAPY PER DAY

## 2024-10-24 PROCEDURE — 83880 ASSAY OF NATRIURETIC PEPTIDE: CPT

## 2024-10-24 PROCEDURE — 3700000000 HC ANESTHESIA ATTENDED CARE: Performed by: INTERNAL MEDICINE

## 2024-10-24 PROCEDURE — 87081 CULTURE SCREEN ONLY: CPT

## 2024-10-24 PROCEDURE — 88312 SPECIAL STAINS GROUP 1: CPT

## 2024-10-24 PROCEDURE — 93005 ELECTROCARDIOGRAM TRACING: CPT | Performed by: NURSE PRACTITIONER

## 2024-10-24 PROCEDURE — 93010 ELECTROCARDIOGRAM REPORT: CPT | Performed by: INTERNAL MEDICINE

## 2024-10-24 PROCEDURE — 6370000000 HC RX 637 (ALT 250 FOR IP): Performed by: INTERNAL MEDICINE

## 2024-10-24 PROCEDURE — 87205 SMEAR GRAM STAIN: CPT

## 2024-10-24 PROCEDURE — 2709999900 HC NON-CHARGEABLE SUPPLY: Performed by: INTERNAL MEDICINE

## 2024-10-24 PROCEDURE — 7100000001 HC PACU RECOVERY - ADDTL 15 MIN: Performed by: INTERNAL MEDICINE

## 2024-10-24 PROCEDURE — 88305 TISSUE EXAM BY PATHOLOGIST: CPT

## 2024-10-24 PROCEDURE — 87070 CULTURE OTHR SPECIMN AEROBIC: CPT

## 2024-10-24 PROCEDURE — 94640 AIRWAY INHALATION TREATMENT: CPT

## 2024-10-24 PROCEDURE — 1200000000 HC SEMI PRIVATE

## 2024-10-24 PROCEDURE — 36415 COLL VENOUS BLD VENIPUNCTURE: CPT

## 2024-10-24 PROCEDURE — 84484 ASSAY OF TROPONIN QUANT: CPT

## 2024-10-24 PROCEDURE — 2580000003 HC RX 258: Performed by: INTERNAL MEDICINE

## 2024-10-24 PROCEDURE — 2500000003 HC RX 250 WO HCPCS: Performed by: NURSE ANESTHETIST, CERTIFIED REGISTERED

## 2024-10-24 PROCEDURE — 87102 FUNGUS ISOLATION CULTURE: CPT

## 2024-10-24 PROCEDURE — 87116 MYCOBACTERIA CULTURE: CPT

## 2024-10-24 PROCEDURE — 6360000002 HC RX W HCPCS: Performed by: INTERNAL MEDICINE

## 2024-10-24 PROCEDURE — 3E033XZ INTRODUCTION OF VASOPRESSOR INTO PERIPHERAL VEIN, PERCUTANEOUS APPROACH: ICD-10-PCS | Performed by: INTERNAL MEDICINE

## 2024-10-24 PROCEDURE — 0B9J8ZX DRAINAGE OF LEFT LOWER LUNG LOBE, VIA NATURAL OR ARTIFICIAL OPENING ENDOSCOPIC, DIAGNOSTIC: ICD-10-PCS | Performed by: INTERNAL MEDICINE

## 2024-10-24 PROCEDURE — 88112 CYTOPATH CELL ENHANCE TECH: CPT

## 2024-10-24 PROCEDURE — 7100000010 HC PHASE II RECOVERY - FIRST 15 MIN: Performed by: INTERNAL MEDICINE

## 2024-10-24 PROCEDURE — A4217 STERILE WATER/SALINE, 500 ML: HCPCS | Performed by: INTERNAL MEDICINE

## 2024-10-24 PROCEDURE — 85027 COMPLETE CBC AUTOMATED: CPT

## 2024-10-24 PROCEDURE — 31624 DX BRONCHOSCOPE/LAVAGE: CPT | Performed by: INTERNAL MEDICINE

## 2024-10-24 PROCEDURE — 3609010800 HC BRONCHOSCOPY ALVEOLAR LAVAGE: Performed by: INTERNAL MEDICINE

## 2024-10-24 PROCEDURE — 3609027000 HC BRONCHOSCOPY: Performed by: INTERNAL MEDICINE

## 2024-10-24 PROCEDURE — 6370000000 HC RX 637 (ALT 250 FOR IP): Performed by: STUDENT IN AN ORGANIZED HEALTH CARE EDUCATION/TRAINING PROGRAM

## 2024-10-24 RX ORDER — LIDOCAINE HYDROCHLORIDE 10 MG/ML
0.3 INJECTION, SOLUTION EPIDURAL; INFILTRATION; INTRACAUDAL; PERINEURAL
Status: DISCONTINUED | OUTPATIENT
Start: 2024-10-24 | End: 2024-10-24 | Stop reason: HOSPADM

## 2024-10-24 RX ORDER — OXYCODONE HYDROCHLORIDE 5 MG/1
10 TABLET ORAL PRN
Status: DISCONTINUED | OUTPATIENT
Start: 2024-10-24 | End: 2024-10-24 | Stop reason: HOSPADM

## 2024-10-24 RX ORDER — SODIUM CHLORIDE 0.9 % (FLUSH) 0.9 %
5-40 SYRINGE (ML) INJECTION PRN
Status: DISCONTINUED | OUTPATIENT
Start: 2024-10-24 | End: 2024-10-24 | Stop reason: HOSPADM

## 2024-10-24 RX ORDER — PHENYLEPHRINE HCL IN 0.9% NACL 1 MG/10 ML
SYRINGE (ML) INTRAVENOUS
Status: DISCONTINUED | OUTPATIENT
Start: 2024-10-24 | End: 2024-10-24 | Stop reason: SDUPTHER

## 2024-10-24 RX ORDER — LABETALOL HYDROCHLORIDE 5 MG/ML
5 INJECTION, SOLUTION INTRAVENOUS EVERY 10 MIN PRN
Status: DISCONTINUED | OUTPATIENT
Start: 2024-10-24 | End: 2024-10-24 | Stop reason: HOSPADM

## 2024-10-24 RX ORDER — PROPOFOL 10 MG/ML
INJECTION, EMULSION INTRAVENOUS
Status: DISCONTINUED | OUTPATIENT
Start: 2024-10-24 | End: 2024-10-24 | Stop reason: SDUPTHER

## 2024-10-24 RX ORDER — MAGNESIUM HYDROXIDE 1200 MG/15ML
LIQUID ORAL CONTINUOUS PRN
Status: COMPLETED | OUTPATIENT
Start: 2024-10-24 | End: 2024-10-24

## 2024-10-24 RX ORDER — SODIUM CHLORIDE 9 MG/ML
INJECTION, SOLUTION INTRAVENOUS PRN
Status: DISCONTINUED | OUTPATIENT
Start: 2024-10-24 | End: 2024-10-24 | Stop reason: HOSPADM

## 2024-10-24 RX ORDER — LIDOCAINE HYDROCHLORIDE 20 MG/ML
INJECTION, SOLUTION INFILTRATION; PERINEURAL
Status: DISCONTINUED | OUTPATIENT
Start: 2024-10-24 | End: 2024-10-24 | Stop reason: SDUPTHER

## 2024-10-24 RX ORDER — NALOXONE HYDROCHLORIDE 0.4 MG/ML
INJECTION, SOLUTION INTRAMUSCULAR; INTRAVENOUS; SUBCUTANEOUS PRN
Status: DISCONTINUED | OUTPATIENT
Start: 2024-10-24 | End: 2024-10-24 | Stop reason: HOSPADM

## 2024-10-24 RX ORDER — SODIUM CHLORIDE 0.9 % (FLUSH) 0.9 %
5-40 SYRINGE (ML) INJECTION EVERY 12 HOURS SCHEDULED
Status: DISCONTINUED | OUTPATIENT
Start: 2024-10-24 | End: 2024-10-24 | Stop reason: HOSPADM

## 2024-10-24 RX ORDER — OXYCODONE HYDROCHLORIDE 5 MG/1
5 TABLET ORAL PRN
Status: DISCONTINUED | OUTPATIENT
Start: 2024-10-24 | End: 2024-10-24 | Stop reason: HOSPADM

## 2024-10-24 RX ORDER — DIPHENHYDRAMINE HYDROCHLORIDE 50 MG/ML
12.5 INJECTION INTRAMUSCULAR; INTRAVENOUS
Status: DISCONTINUED | OUTPATIENT
Start: 2024-10-24 | End: 2024-10-24 | Stop reason: HOSPADM

## 2024-10-24 RX ORDER — IPRATROPIUM BROMIDE AND ALBUTEROL SULFATE 2.5; .5 MG/3ML; MG/3ML
1 SOLUTION RESPIRATORY (INHALATION) ONCE
Status: COMPLETED | OUTPATIENT
Start: 2024-10-24 | End: 2024-10-24

## 2024-10-24 RX ORDER — DROPERIDOL 2.5 MG/ML
0.62 INJECTION, SOLUTION INTRAMUSCULAR; INTRAVENOUS
Status: DISCONTINUED | OUTPATIENT
Start: 2024-10-24 | End: 2024-10-24 | Stop reason: HOSPADM

## 2024-10-24 RX ORDER — SODIUM CHLORIDE, SODIUM LACTATE, POTASSIUM CHLORIDE, CALCIUM CHLORIDE 600; 310; 30; 20 MG/100ML; MG/100ML; MG/100ML; MG/100ML
INJECTION, SOLUTION INTRAVENOUS CONTINUOUS
Status: DISCONTINUED | OUTPATIENT
Start: 2024-10-24 | End: 2024-10-24 | Stop reason: HOSPADM

## 2024-10-24 RX ADMIN — DOXYCYCLINE HYCLATE 100 MG: 100 TABLET, COATED ORAL at 08:59

## 2024-10-24 RX ADMIN — IPRATROPIUM BROMIDE AND ALBUTEROL SULFATE 1 DOSE: 2.5; .5 SOLUTION RESPIRATORY (INHALATION) at 13:51

## 2024-10-24 RX ADMIN — Medication 2 PUFF: at 16:13

## 2024-10-24 RX ADMIN — LIDOCAINE HYDROCHLORIDE 100 MG: 20 INJECTION, SOLUTION INFILTRATION; PERINEURAL at 12:38

## 2024-10-24 RX ADMIN — Medication 2 PUFF: at 20:23

## 2024-10-24 RX ADMIN — PROPOFOL 150 MG: 10 INJECTION, EMULSION INTRAVENOUS at 12:41

## 2024-10-24 RX ADMIN — DOXYCYCLINE HYCLATE 100 MG: 100 TABLET, COATED ORAL at 21:19

## 2024-10-24 RX ADMIN — PROPOFOL 50 MG: 10 INJECTION, EMULSION INTRAVENOUS at 12:47

## 2024-10-24 RX ADMIN — Medication 2 PUFF: at 07:33

## 2024-10-24 RX ADMIN — SODIUM CHLORIDE, PRESERVATIVE FREE 10 ML: 5 INJECTION INTRAVENOUS at 09:00

## 2024-10-24 RX ADMIN — CEFTRIAXONE SODIUM 1000 MG: 1 INJECTION, POWDER, FOR SOLUTION INTRAMUSCULAR; INTRAVENOUS at 18:02

## 2024-10-24 RX ADMIN — SODIUM CHLORIDE, PRESERVATIVE FREE 10 ML: 5 INJECTION INTRAVENOUS at 21:19

## 2024-10-24 RX ADMIN — TIOTROPIUM BROMIDE INHALATION SPRAY 1 PUFF: 3.12 SPRAY, METERED RESPIRATORY (INHALATION) at 07:33

## 2024-10-24 RX ADMIN — HYDROCHLOROTHIAZIDE 25 MG: 25 TABLET ORAL at 08:59

## 2024-10-24 RX ADMIN — Medication 100 MCG: at 12:47

## 2024-10-24 RX ADMIN — LOSARTAN POTASSIUM 100 MG: 100 TABLET, FILM COATED ORAL at 08:59

## 2024-10-24 ASSESSMENT — PAIN - FUNCTIONAL ASSESSMENT
PAIN_FUNCTIONAL_ASSESSMENT: 0-10

## 2024-10-24 ASSESSMENT — ENCOUNTER SYMPTOMS
VOMITING: 0
SHORTNESS OF BREATH: 1
ABDOMINAL PAIN: 1
CHEST TIGHTNESS: 1
COUGH: 1

## 2024-10-24 NOTE — PROGRESS NOTES
Pt called out c/o not being able catch his breath after ambulation to the bathroom. SPO2 86% ON 2Ls, tachypneic & uneven respirations. Oxygen increased to 4Ls and SPO2 now 94%, respirations regular & even. Pt advise to limit exertion, urinal at bedside & bed alarm now in place.

## 2024-10-24 NOTE — PROGRESS NOTES
Pt returned back from Bronch. Pt alert. VSS. Pt on simple mask at 5L. CMU notified of pt return. Wife at bedside. Pt passed bedside swallow evaluation/

## 2024-10-24 NOTE — ANESTHESIA POSTPROCEDURE EVALUATION
Department of Anesthesiology  Postprocedure Note    Patient: Tyler Reveles  MRN: [id number]  YOB: 1945  Date of evaluation: 10/24/2024    Procedure Summary       Date: 10/24/24 Room / Location: [place] [address] / [facility]    Anesthesia Start: 1235 Anesthesia Stop: 1322    Procedures:       BRONCHOSCOPY ALVEOLAR LAVAGE      BRONCHOSCOPY DIAGNOSTIC OR CELL WASH ONLY Diagnosis:       Cavitary lesion of lung      (Cavitary lesion of lung [J98.4])    Surgeons: Sandoval Salomon MD Responsible Provider: Tyler Crespo MD    Anesthesia Type: general ASA Status: 3            Anesthesia Type: No value filed.    Aleksander Phase I: Aleksander Score: 10    Aleksander Phase II: Aleksander Score: 10    Anesthesia Post Evaluation    Comments: Postoperative Anesthesia Note    Name:    Tyler Reveels  MRN:      [id number]    Patient Vitals in the past 12 hrs:  10/24/24 1415, BP:(!) 130/57, Temp:97 °F (36.1 °C), Temp src:Temporal, Pulse:(!) 114, Resp:20  10/24/24 1400, Pulse:(!) 118, Resp:(!) 81, SpO2:(!) 81 %  10/24/24 1359, BP:124/60, Pulse:(!) 119, Resp:26, SpO2:(!) 84 %  10/24/24 1344, Pulse:(!) 119, Resp:28, SpO2:96 %  10/24/24 1329, BP:118/60, Pulse:(!) 122, SpO2:95 %  10/24/24 1324, BP:(!) 144/54, Pulse:(!) 124, Resp:(!) 35, SpO2:95 %  10/24/24 1319, Pulse:(!) 119, Resp:25, SpO2:92 %  10/24/24 1314, BP:(!) 157/74, Temp:97.6 °F (36.4 °C), Temp src:Temporal, Pulse:(!) 121, Resp:30, SpO2:(!) 89 %  10/24/24 1133, BP:(!) 162/72, Temp:99.3 °F (37.4 °C), Pulse:(!) 112, Resp:18, SpO2:91 %  10/24/24 1015, BP:(!) 167/71, Temp:98.6 °F (37 °C), Temp src:Oral, Pulse:(!) 105, Resp:14, SpO2:90 %  10/24/24 0945, Resp:20, SpO2:91 %  10/24/24 0825, Pulse:(!) 105  10/24/24 0745, BP:(!) 156/79, Temp:99 °F (37.2 °C), Temp src:Oral, Pulse:(!) 105, Resp:12, SpO2:97 %  10/24/24 0734, SpO2:94 %  10/24/24 0620, SpO2:94 %  10/24/24 0600, SpO2:(!) 86 %  10/24/24 0445, BP:(!) 158/86, Temp:98.6 °F (37 °C), Temp src:Oral, Pulse:99, Resp:22,

## 2024-10-24 NOTE — PROGRESS NOTES
ID chart note     Patient is off the floor for bronch     Reviewed available diagnostics  MRSA screen neg   Legionella and pneumococcal ag are negative     BC are negative to date     Tests for histo and the sputum cultures and PCR panel are all pending     WBC was higher today at 16  24h Tm was 99.7   On 4L NC     Will see him tomorrow   Continuing susanne ridley   Follow trend WBC   AURELIANO RUIZ MD

## 2024-10-24 NOTE — PROCEDURES
Bronchoscopy Procedure Note    Date of Operation: 10/24/24  Pre-op Diagnosis: Pneumonia  Post-op Diagnosis: Same, possible aspiration  Surgeon: Sandoval Salomon MD  Anesthesia: General LMA anesthesia  Estimate Blood Loss: Minimal   Complications: None    Indications and History:  The patient is 79 y.o. male with MONTY infiltrate and a history of NTM. The risks, benefits, complications, treatment options and expected outcomes were discussed with the patient. The possibilities of reaction to medication, pulmonary aspiration, perforation of a viscus, bleeding, failure to diagnose a condition and creating a complication requiring transfusion or operation were discussed with the patient who freely signed the consent.     Description of Procedure:   The patient was taken to endoscopy suite, identified as Tyler Reveles and the procedure verified. A time out was held and the above information confirmed.   After the initiation of anesthesia, the patient was placed in appropriate position and the bronchoscope was passed through the LMA . The scope was then passed into the trachea. Lidocaine 2% 3 ml was used topically on the khushi. Careful inspection of the tracheal lumen was accomplished. The scope was sequentially passed into the left main and then left upper and lower bronchi and segmental bronchi.   The scope was then withdrawn and advanced into the right main bronchus and then into the RUL, RML, and RLL bronchi and segmental bronchi.     Endobronchial findings:   Vocal Cords: Normal  Trachea: Normal mucosa   Khushi: Normal mucosa   Right main bronchus: Normal mucosa   Right upper lobe bronchus: Normal mucosa   Right middle lobe bronchus: Normal mucosa   Right lower lobe bronchus: Normal mucosa   Left main bronchus: Normal mucosa   Left upper lobe bronchus: Normal mucosa   Left lower lobe bronchus: Normal mucosa     The MONTY was entered and a wedge position obtained. 90 cc normal saline bronchoalveolar lavage was carefully

## 2024-10-24 NOTE — PROGRESS NOTES
Pt found with oxygen off twice. Oxygen 84% on room air. Pt educated on the importance of keeping oxygen mask on. Pt states it is hard to hear his friend or the TV with the venturi mask on. Explained to pt that the nasal cannula could be put back in place to make it easier on pt. Pt continues to refuse nasal cannula because he states, \"The doctor told me I am not getting enough oxygen with the nasal cannula because I mouth breathe.\" Reinforced with pt that he is not being provided any supplemental oxygen if he takes off the mask completely.

## 2024-10-24 NOTE — PROGRESS NOTES
Found patient on 4lpm simple mask. Changed patient to 30% venturi-mask to be able to titrate oxygen.   10/24/24 1613   Oxygen Therapy/Pulse Ox   O2 Therapy Oxygen   O2 Device Venturi-mask   O2 Flow Rate (L/min) 6 L/min   FiO2  30 %   SpO2 96 %

## 2024-10-24 NOTE — PROGRESS NOTES
Hospital Medicine Progress Note  V10/16      Date of Admission: 10/22/2024  Hospital Day: 3    Chief Admission Complaint:  SOB     Subjective:  EMR and notes reviewed pt seen and examined. Requiring additional O2, having increased work of breathing.       Presenting Admission History:       79 y.o. male w/ remote hx Lung CA and Non-TB AFB infection who presented to [facility] with a 4-5 day hx of progressive SOB/GARIBAY that started on/about Thurs 17 October w/ associated generalized malaise/fatigue. Sxs so severe he had to cancel singing practice as well as Golf outing.      The patient denies any fever/chills or other signs/sxs of systemic illness or identifiable aggravating/alleviating factors\"    Assessment/Plan:      Current Principal Problem:  Lingular pneumonia    PNA - likely Gram Positive Community Acquired Pneumonia, possibly due to Strep Pneumonia.  MONTY cavitary findings on admission CT scan.  Patient w/ hx non-TB AFB infection 2016.  Infectious Disease consulted from ED and appreciated.   Started on empiric Rocephin/Azithro in ED on 22 October - continued and changed to DAILY dosing.    - 10/23 WBC improving 17.9> 13.7/ ID notes reviewed, will follow. Pulm consult was placed for possible diagnostic/ therapeutic bronch. Holding off on Macrolide, cont Rocephin and ID added doxy   10/24 notes reviewed from pulm and ID- appreciate input. Plan for bronch per pulm     Acute Resp Failure with hypoxia in the setting of above, sat drop 88% requiring supplemental O2, cont to monitor and wean as tolerated   - 10/24 remains requiring supplemental in 2-40 liter per nc      Sepsis - w/ Leukocytosis/Tachycardia/Elevated Lactate POArrival 2nd to above infection.  Continue IVF as appropriate and monitor clinical response w/ ABX as ordered.       ARF - w/ elevated BUN/Cr ratio likely secondary to pre-renal azotemia.  Followed serial BUN/Creatinine on IVF x 1L hydration personally reviewed and documented in this note.   non-distended.  Musculoskelatal:  No edema  Neurologic:  Non-focal  Psychiatric:  Alert and oriented    Telemetry:     Sinus tach rate 104    BP (!) 158/86   Pulse 99   Temp 98.6 °F (37 °C) (Oral)   Resp 22   Ht 1.803 m (5' 11\")   Wt 68.9 kg (151 lb 12.8 oz)   SpO2 94%   BMI 21.17 kg/m²     Diet: Diet NPO    DVT Prophylaxis: [x]PPx LMWH  []SQ Heparin  []IPC/SCDs  []Eliquis  []Xarelto  []Coumadin  []Other -      Code status: Full Code    PT/OT Eval Status:   [x]NOT yet ordered  []Ordered and Pending   []Seen with Recommendations for:   []Home independently  []Home w/ assist  []HHC  []SNF  []Acute Rehab    Multi-Disciplinary Rounds with Case Management completed on 10/24/2024 with the following recommendations:  Anticipated Discharge Location: [x]Home  []HHC  []SNF  []Acute Rehab  []LTAC  []Hospice  []Other -    Anticipated Discharge Day/Date:  2-3 days   Barriers to Discharge:   --------------------------------------------------    [facility]  (this section is meant as an aid to accurate billing and as a supplement to ongoing patient care which is may be documented elsewhere in this note and the medical record)    [] High (any 2)    A. Problems (any 1)  [x] Acute/Chronic Illness/injury posing ongoing threat to life and/or bodily function w/out ongoing treatment due to:    [] Severe exacerbation of chronic illness:    --------------------------------------------------  B. Risk of Treatment (any 1)   [] Drugs/treatments that require intensive monitoring for toxicity    [x] IV ABX (Vancomycin, Aminoglycosides)     [] Post-Cath/Contrast study requiring serial monitoring  [] IV Narcotic analgesia   [] Aggressive IV diuresis  [] Hypertonic Saline   [] Critical electrolyte abnormalities requiring IV replacement  [] Insulin - Scheduled/SSI or Insulin gtt  [] Anticoagulation (Heparin gtt or Coumadin - other anticoagulants in special circumstances)   [] Cardiac Medications (IV Amiodarone, Tikosyn)  [] Hemodialysis  [] Other

## 2024-10-24 NOTE — CARE COORDINATION
Case Management Assessment  Initial Evaluation    Date/Time of Evaluation: 10/24/2024 4:36 PM  Assessment Completed by: Cherelle Hall RN    If patient is discharged prior to next notation, then this note serves as note for discharge by case management.    Patient Name: Tyler Reveles                   YOB: 1945  Diagnosis: Cavitating mass in left upper lung lobe [J98.4]  Lingular pneumonia [J18.9]  Severe sepsis (HCC) [A41.9, R65.20]  Acute pneumonia [J18.9]                   Date / Time: 10/22/2024  3:19 PM    Patient Admission Status: Inpatient   Readmission Risk (Low < 19, Mod (19-27), High > 27): Readmission Risk Score: 12.1    Current PCP: Gianni Chung, DO  PCP verified by CM? Yes    Chart Reviewed: Yes      History Provided by: Patient  Patient Orientation: Alert and Oriented    Patient Cognition: Alert    Hospitalization in the last 30 days (Readmission):  No    If yes, Readmission Assessment in CM Navigator will be completed.    Advance Directives:      Code Status: Full Code   Patient's Primary Decision Maker is:        Discharge Planning:    Patient lives with: Spouse/Significant Other Type of Home: House  Primary Care Giver: Self  Patient Support Systems include: Spouse/Significant Other   Current Financial resources: Medicare  Current community resources:    Current services prior to admission: None            Current DME:              Type of Home Care services:  None    ADLS  Prior functional level: Independent in ADLs/IADLs  Current functional level: Independent in ADLs/IADLs    PT AM-PAC:   /24  OT AM-PAC:   /24    Family can provide assistance at DC: Yes  Would you like Case Management to discuss the discharge plan with any other family members/significant others, and if so, who? Yes (wife)  Plans to Return to Present Housing: Yes  Potential Assistance needed at discharge: Other (Comment)            Potential DME:    Patient expects to discharge to: House  Plan for transportation

## 2024-10-24 NOTE — PROGRESS NOTES
Pt arrives in PACU with labored resps.  Pt awake and mouth breathing.  O2 NC 4 L.  SPO2 77%.  Pt placed on NRB mask at 15 L.  SPO2 92%.  Dr. Crespo advised.

## 2024-10-24 NOTE — ANESTHESIA PRE PROCEDURE
results found for: \"ABORH\", \"LABANTI\"    Drug/Infectious Status (If Applicable):  No results found for: \"HIV\", \"HEPCAB\"    COVID-19 Screening (If Applicable):   Lab Results   Component Value Date/Time    COVID19 NOT DETECTED 10/22/2024 03:39 PM           Anesthesia Evaluation  Patient summary reviewed and Nursing notes reviewed   no history of anesthetic complications:   Airway: Mallampati: II  TM distance: >3 FB   Neck ROM: full  Mouth opening: > = 3 FB   Dental: normal exam     Comment: Pt aware of risk to dentition during airway manipulation and during anesthesia/emergence.  They understand the risks and wishes to proceed.    Pulmonary:Negative Pulmonary ROS and normal exam    (+) pneumonia:  COPD:                                    ROS comment: On 4L NC, Bronchiectasis   Cardiovascular:Negative CV ROS  Exercise tolerance: good (>4 METS)  (+) hypertension:, valvular problems/murmurs (mild TR):, hyperlipidemia      ECG reviewed    Rate: abnormal  Echocardiogram reviewed                 PE comment: Sinus tachycardia   Neuro/Psych:   Negative Neuro/Psych ROS              GI/Hepatic/Renal: Neg GI/Hepatic/Renal ROS  (+) renal disease (improving): ARF     (-) hiatal hernia and GERD       Endo/Other: Negative Endo/Other ROS   (+) blood dyscrasia: anemia:., malignancy/cancer (h/o lung cancer s/p resection in 1999).                 Abdominal:             Vascular: negative vascular ROS.         Other Findings:        EKG 10/24/24  Sinus tachycardiaCannot rule out Inferior infarct , age undeterminedAbnormal ECGWhen compared with ECG of 22-OCT-2024 16:14,Minimal criteria for Anterior infarct are no longer PresentNo significant change was found     TTE 3/5/24   Summary   Left ventricular systolic function appears to be normal with a visually   estimated ejection fraction of 55-60%.   EF estimated by Ardon's method at 59-60 %.   No obvious regional wall motion abnormalities noted.   Normal left ventricular diastolic

## 2024-10-24 NOTE — PLAN OF CARE
Problem: Safety - Adult  Goal: Free from fall injury  10/24/2024 1122 by Jeanine Cordero RN  Outcome: Progressing   Patient remains free from falls during this shift. Oriented to call light. Verbalizes understanding. Alert and oriented x4. Bedside table and call light within reach.  Bed in lowest position, brakes locked. Nonskid footwear in place. Bed alarm on and in place. Will continue to monitor.

## 2024-10-24 NOTE — PROGRESS NOTES
Spoke with Anitra in the Lab. Cell count unable to be preformed on pt's bronchial lavage due to particles being present.

## 2024-10-25 ENCOUNTER — APPOINTMENT (OUTPATIENT)
Dept: GENERAL RADIOLOGY | Age: 79
DRG: 871 | End: 2024-10-25
Payer: MEDICARE

## 2024-10-25 PROBLEM — Z86.19 HISTORY OF ATYPICAL MYCOBACTERIAL INFECTION: Status: ACTIVE | Noted: 2024-10-25

## 2024-10-25 PROBLEM — E43 SEVERE MALNUTRITION (HCC): Status: ACTIVE | Noted: 2024-10-25

## 2024-10-25 PROBLEM — N17.9 AKI (ACUTE KIDNEY INJURY) (HCC): Status: ACTIVE | Noted: 2024-10-25

## 2024-10-25 LAB
ACID FAST STN SPEC QL: NORMAL
ANION GAP SERPL CALCULATED.3IONS-SCNC: 10 MMOL/L (ref 3–16)
BUN SERPL-MCNC: 34 MG/DL (ref 7–20)
CALCIUM SERPL-MCNC: 8.9 MG/DL (ref 8.3–10.6)
CHLORIDE SERPL-SCNC: 100 MMOL/L (ref 99–110)
CO2 SERPL-SCNC: 32 MMOL/L (ref 21–32)
CREAT SERPL-MCNC: 1.3 MG/DL (ref 0.8–1.3)
DEPRECATED RDW RBC AUTO: 13.4 % (ref 12.4–15.4)
GFR SERPLBLD CREATININE-BSD FMLA CKD-EPI: 56 ML/MIN/{1.73_M2}
GLUCOSE SERPL-MCNC: 147 MG/DL (ref 70–99)
H CAPSUL AG SER IA-ACNC: NOT DETECTED
H CAPSUL AG SER QL IA: NOT DETECTED
H CAPSUL MYC AB TITR SER CF: NORMAL {TITER}
H CAPSUL YST AB TITR SER CF: NORMAL {TITER}
HCT VFR BLD AUTO: 33.3 % (ref 40.5–52.5)
HGB BLD-MCNC: 11.5 G/DL (ref 13.5–17.5)
LOEFFLER MB STN SPEC: NORMAL
LOEFFLER MB STN SPEC: NORMAL
MCH RBC QN AUTO: 31.4 PG (ref 26–34)
MCHC RBC AUTO-ENTMCNC: 34.4 G/DL (ref 31–36)
MCV RBC AUTO: 91.4 FL (ref 80–100)
MTB COMPLEX PCR: NORMAL
PLATELET # BLD AUTO: 288 K/UL (ref 135–450)
PMV BLD AUTO: 7.5 FL (ref 5–10.5)
POTASSIUM SERPL-SCNC: 4.2 MMOL/L (ref 3.5–5.1)
RBC # BLD AUTO: 3.65 M/UL (ref 4.2–5.9)
SODIUM SERPL-SCNC: 142 MMOL/L (ref 136–145)
WBC # BLD AUTO: 18.3 K/UL (ref 4–11)

## 2024-10-25 PROCEDURE — 6370000000 HC RX 637 (ALT 250 FOR IP): Performed by: INTERNAL MEDICINE

## 2024-10-25 PROCEDURE — 92526 ORAL FUNCTION THERAPY: CPT

## 2024-10-25 PROCEDURE — 2580000003 HC RX 258: Performed by: INTERNAL MEDICINE

## 2024-10-25 PROCEDURE — 85027 COMPLETE CBC AUTOMATED: CPT

## 2024-10-25 PROCEDURE — 6370000000 HC RX 637 (ALT 250 FOR IP): Performed by: NURSE PRACTITIONER

## 2024-10-25 PROCEDURE — 92610 EVALUATE SWALLOWING FUNCTION: CPT

## 2024-10-25 PROCEDURE — 92611 MOTION FLUOROSCOPY/SWALLOW: CPT

## 2024-10-25 PROCEDURE — 94761 N-INVAS EAR/PLS OXIMETRY MLT: CPT

## 2024-10-25 PROCEDURE — 99232 SBSQ HOSP IP/OBS MODERATE 35: CPT | Performed by: INTERNAL MEDICINE

## 2024-10-25 PROCEDURE — 1200000000 HC SEMI PRIVATE

## 2024-10-25 PROCEDURE — 80048 BASIC METABOLIC PNL TOTAL CA: CPT

## 2024-10-25 PROCEDURE — 36415 COLL VENOUS BLD VENIPUNCTURE: CPT

## 2024-10-25 PROCEDURE — 6360000002 HC RX W HCPCS: Performed by: INTERNAL MEDICINE

## 2024-10-25 PROCEDURE — 94640 AIRWAY INHALATION TREATMENT: CPT

## 2024-10-25 PROCEDURE — 74230 X-RAY XM SWLNG FUNCJ C+: CPT

## 2024-10-25 PROCEDURE — 2700000000 HC OXYGEN THERAPY PER DAY

## 2024-10-25 RX ORDER — CALCIUM CARBONATE 500 MG/1
500 TABLET, CHEWABLE ORAL 3 TIMES DAILY PRN
Status: DISCONTINUED | OUTPATIENT
Start: 2024-10-25 | End: 2024-10-26 | Stop reason: HOSPADM

## 2024-10-25 RX ADMIN — CALCIUM CARBONATE 500 MG: 500 TABLET, CHEWABLE ORAL at 21:29

## 2024-10-25 RX ADMIN — ROSUVASTATIN CALCIUM 10 MG: 10 TABLET, FILM COATED ORAL at 09:10

## 2024-10-25 RX ADMIN — Medication 2 PUFF: at 20:14

## 2024-10-25 RX ADMIN — SODIUM CHLORIDE, PRESERVATIVE FREE 10 ML: 5 INJECTION INTRAVENOUS at 09:11

## 2024-10-25 RX ADMIN — DOXYCYCLINE HYCLATE 100 MG: 100 TABLET, COATED ORAL at 09:09

## 2024-10-25 RX ADMIN — Medication 2 PUFF: at 12:20

## 2024-10-25 RX ADMIN — Medication 2 PUFF: at 08:03

## 2024-10-25 RX ADMIN — CEFEPIME 2000 MG: 2 INJECTION, POWDER, FOR SOLUTION INTRAVENOUS at 14:26

## 2024-10-25 RX ADMIN — TIOTROPIUM BROMIDE INHALATION SPRAY 1 PUFF: 3.12 SPRAY, METERED RESPIRATORY (INHALATION) at 08:03

## 2024-10-25 RX ADMIN — Medication 2 PUFF: at 15:52

## 2024-10-25 RX ADMIN — HYDROCHLOROTHIAZIDE 25 MG: 25 TABLET ORAL at 09:09

## 2024-10-25 RX ADMIN — ASPIRIN 325 MG: 325 TABLET ORAL at 09:09

## 2024-10-25 RX ADMIN — LOSARTAN POTASSIUM 100 MG: 100 TABLET, FILM COATED ORAL at 09:09

## 2024-10-25 ASSESSMENT — PAIN SCALES - GENERAL: PAINLEVEL_OUTOF10: 0

## 2024-10-25 NOTE — PROCEDURES
Speech Language Pathology  Modified Barium Swallow Study  Facility/Department: [facility] A2 CARD TELEMETRY        Recommendations:  Diet recommendation: IDDSI 7 Regular Solids; IDDSI 0 Thin Liquids; Meds whole with thin liquids  Risk management: upright for all intake, stay upright for at least 30 mins after intake, small bites/sips, close supervision, oral care 2-3x/day to reduce adverse affects in the event of aspiration, increase physical mobility as able, alternate bites/sips, slow rate of intake, general GERD precautions, STRICT aspiration precautions, and hold PO and contact SLP if s/s of aspiration or worsening respiratory status develop.    **Observed post-swallow residue for all trials and increased residue following solid consistencies. Rec alt bite and sips for all meals   ** D/t pt's increased respiratory needs rec breathing/swallow coordination strategies with meals       NAME:Tyler Reveles  : 1945 (79 y.o.)   MRN: [id number]  ROOM: [address] [place] [address]  ADMISSION DATE: 10/22/2024  PATIENT DIAGNOSIS(ES): Cavitating mass in left upper lung lobe [J98.4]  Lingular pneumonia [J18.9]  Severe sepsis (HCC) [A41.9, R65.20]  Acute pneumonia [J18.9]  Chief Complaint   Patient presents with    Shortness of Breath     Pt to ED for c/o SOB and increased fatigue since last Thursday.      Patient Active Problem List    Diagnosis Date Noted    Pulmonary nodule     Bronchiectasis with acute lower respiratory infection (HCC) 10/23/2024    Lingular pneumonia 10/22/2024    Chronic obstructive pulmonary disease, unspecified 2024    Cavitary lung disease 2016    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 (HCC) 2016     Past Medical History:   Diagnosis Date    Acid fast bacillus 2016    sputum - QuantiFERON-TB Gold is negative - DNA probe negative for TB    Aneurysm (HCC)     brain    Cancer (HCC)     lung    Cavitary lesion of lung     Hyperlipidemia     Hypertension     Pneumonia         Esophageal Phase    Esophageal phase comment: Esophageal phase of the swallow grossly functional from lateral view of cervical esophagus to oropharynx       Aspiration Scale    1 Material does not enter the airway    2 Material enters the airway, remains above the vocal folds, and is ejected from the airway         X 3 Material enters the airway, remains above the vocal folds, and is not ejected from the airway    4 Material enters the airway, contacts the vocal folds, an is ejected from the airway    5 Material enters the airway, contacts the vocal folds, and is not ejected from the airway    6 Material enters the airway, passes below the vocal folds and is ejected into the larynx or out of the airway    7 Material enters the airway, passes below the vocal folds, and is not ejected from the trachea despite effort    8 Material enters the airway, passes below the vocal folds, and no effort is made to eject.           Compensatory Swallowing Strategies Attempted: Double swallow   Postural Changes and/or Swallow Maneuvers Trialed: N/A   Patient Position: Lateral and Patient Degrees: 90 degrees, Seated upright in [facility] chair  Consistencies Administered: Thin tsp, Thin cup sip, Thin straw , Thin sequential cup/straw sips , Puree, Regular        Recommendations:  Diet recommendation: IDDSI 7 Regular Solids; IDDSI 0 Thin Liquids; Meds whole with thin liquids  Risk management: upright for all intake, stay upright for at least 30 mins after intake, small bites/sips, close supervision, oral care 2-3x/day to reduce adverse affects in the event of aspiration, increase physical mobility as able, alternate bites/sips, slow rate of intake, general GERD precautions, STRICT aspiration precautions, and hold PO and contact SLP if s/s of aspiration or worsening respiratory status develop.    Safe Swallow Protocol:  Supervision: 1:1   Compensatory Swallowing Strategies: HOB 90* and 30\" after meals; small bites/sips; alternate

## 2024-10-25 NOTE — PLAN OF CARE
SLP completed evaluation. Please refer to notes in EMR.    Diya Mcdonald MA CF-SLP   Speech Language Pathologist

## 2024-10-25 NOTE — PROGRESS NOTES
Speech Language Pathology  Clinical Bedside Swallow Assessment  Facility/Department: [facility] A2 CARD TELEMETRY        Recommendations:  Diet recommendation: IDDSI 7 Regular Solids; IDDSI 0 Thin Liquids; Meds whole with thin liquids  Instrumentation: MBSS is recommended to further assess oropharyngeal structures and functions   Risk management: upright for all intake, stay upright for at least 30 mins after intake, small bites/sips, distant supervision with intake, oral care 2-3x/day to reduce adverse affects in the event of aspiration, increase physical mobility as able, alternate bites/sips, slow rate of intake, general GERD precautions, STRICT aspiration precautions, and hold PO and contact SLP if s/s of aspiration or worsening respiratory status develop.      NAME:Tyler Reveles  : 1945 (79 y.o.)   MRN: [id number]  ROOM: 0208/0208-01  ADMISSION DATE: 10/22/2024  PATIENT DIAGNOSIS(ES): Cavitating mass in left upper lung lobe [J98.4]  Lingular pneumonia [J18.9]  Severe sepsis (HCC) [A41.9, R65.20]  Acute pneumonia [J18.9]  Chief Complaint   Patient presents with    Shortness of Breath     Pt to ED for c/o SOB and increased fatigue since last Thursday.      Patient Active Problem List    Diagnosis Date Noted    Pulmonary nodule     Bronchiectasis with acute lower respiratory infection (HCC) 10/23/2024    Lingular pneumonia 10/22/2024    Chronic obstructive pulmonary disease, unspecified 2024    Cavitary lung disease 2016    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 (HCC) 2016     Past Medical History:   Diagnosis Date    Acid fast bacillus 2016    sputum - QuantiFERON-TB Gold is negative - DNA probe negative for TB    Aneurysm (HCC)     brain    Cancer (HCC)     lung    Cavitary lesion of lung     Hyperlipidemia     Hypertension     Pneumonia     Pulmonary nodules      Past Surgical History:   Procedure Laterality Date    BRONCHOSCOPY  2016    BRONCHOSCOPY N/A

## 2024-10-25 NOTE — PLAN OF CARE
Problem: Pain  Goal: Verbalizes/displays adequate comfort level or baseline comfort level  Outcome: Progressing  Flowsheets (Taken 10/23/2024 1231 by Melissa Pina RN)  Verbalizes/displays adequate comfort level or baseline comfort level: Assess pain using appropriate pain scale

## 2024-10-25 NOTE — CONSULTS
Comprehensive Nutrition Assessment    Type and Reason for Visit:  Initial, Consult (weight loss and decreased appetite)    Nutrition Recommendations/Plan:   Continue regular diet texture per SLP.  Continue vanilla Ensure Plus HP - decrease to once/day.   Encourage PO intakes.  Monitor labs, BM, nausea, tolerance of diet      Malnutrition Assessment:  Malnutrition Status:  Severe malnutrition (10/25/24 1236)    Context:  Acute Illness     Findings of the 6 clinical characteristics of malnutrition:  Energy Intake:  50% or less of estimated energy requirements for 5 or more days  Weight Loss:  No significant weight loss     Body Fat Loss:  Moderate body fat loss Orbital, Triceps, Buccal region   Muscle Mass Loss:  Moderate muscle mass loss Temples (temporalis), Clavicles (pectoralis & deltoids), Hand (interosseous)  Fluid Accumulation:  No significant fluid accumulation     Strength:  Not Performed    Nutrition Assessment:    Seen for consult due to weight loss and decreased appetite. Patient admitted for PNA, sepsis, ARF, acute Resp Failure with hypoxia. History of HLD, HTN, lung cancer. S/P bronchoscopy 10/24, S/P MBS 10/25. SLP recommending IDDSI 7 Regular Solids; IDDSI 0 Thin Liquids; Meds whole with thin liquids. Reports appetite has been poor x1 week due to not feeling well. States he is \"a good snacker\" at baseline. No BM x3 days. Reports \"some\" nausea. States his UBW is 150-160lbs, \"not able\" to gain weight. Reports drinking ensure plus high protein ~once/day. Will continue to monitor.    Nutrition Related Findings:    BUN 34, GFR 56, glucose 147, last bowel movement 10/22 Wound Type: None       Current Nutrition Intake & Therapies:    Average Meal Intake: 0%, 1-25%  Average Supplements Intake: 51-75%  ADULT DIET; Regular  ADULT ORAL NUTRITION SUPPLEMENT; Lunch; Standard High Calorie/High Protein Oral Supplement    Anthropometric Measures:  Height: 180.3 cm (5' 11\")  Ideal Body Weight (IBW): 172 lbs (78 kg)

## 2024-10-25 NOTE — PROGRESS NOTES
clarithro/rifampin/ethambutol 11/2016-3/2019, >12 months treatment after sputum conversion      Worsening R cavitary lung disease noted 6/2022, with growth of SOPHIE and M abscesses each in 1 of 3 AFB BAL cultures collected 6/2022, treated again w clarithro/rifampin/ethambutol 8/2022-6/2024   There was no subsequent sputum cultures collected during or following treatment      Admitted 10/22/24 with 5d history malaise, weakness, low grade fever, worsening cough and SOB   Extensive consolidation of the MONTY with cavitation.  The R apical cavity is stable   Pseudomonas isolated by PCR and in culture from expectorated sputum, sensi pending   BAL 10/24, cultures pending   MBS was reassuring, no evidence of aspiration      WEN on admission, resolving      No abx allergies     Plan:     Change to cefepime for pseudomonal coverage pending sensitivities   Then change to po levaquin or cipro if S     Would treat at least 7 d for Pseudomonas pna and then look for AFB cultures, underlying NTM infection remains a possibility         Discussed with patient/family, all questions answered  D/w Pulm       Susie Ardon MD  Phone: [phone]   Fax : [phone]

## 2024-10-25 NOTE — PROGRESS NOTES
Hospital Medicine Progress Note  V10/16      Date of Admission: 10/22/2024  Hospital Day: 4    Chief Admission Complaint:  SOB     Subjective:  EMR and notes reviewed pt seen and examined. Requiring additional O2- apparently pt has nasal septal occulusion from fracture nose many years ago and anesthesia told him he needed a smile deisy. Dicussed trial of NC with monitoring pulse ox agreeable. Feels like he has some improvement in his breathing to exertional       Presenting Admission History:       79 y.o. male w/ remote hx Lung CA and Non-TB AFB infection who presented to [facility] with a 4-5 day hx of progressive SOB/GARIBAY that started on/about Thurs 17 October w/ associated generalized malaise/fatigue. Sxs so severe he had to cancel singing practice as well as Golf outing.      The patient denies any fever/chills or other signs/sxs of systemic illness or identifiable aggravating/alleviating factors\"    Assessment/Plan:      Current Principal Problem:  Lingular pneumonia    PNA - likely Gram Positive Community Acquired Pneumonia, possibly due to Strep Pneumonia.  MONTY cavitary findings on admission CT scan.  Patient w/ hx non-TB AFB infection 2016.  Infectious Disease consulted from ED and appreciated.   Started on empiric Rocephin/Azithro in ED on 22 October - continued and changed to DAILY dosing.    - 10/23 WBC improving 17.9> 13.7/ ID notes reviewed, will follow. Pulm consult was placed for possible diagnostic/ therapeutic bronch. Holding off on Macrolide, cont Rocephin and ID added doxy   10/24 notes reviewed from pulm and ID- appreciate input. Plan for bronch per pulm   10/25 tolerated bronch cult pending- expectorated sputum with psuedo and suspect will need  coverage - defer to ID     Acute Resp Failure with hypoxia in the setting of above, sat drop 88% requiring supplemental O2, cont to monitor and wean as tolerated   - 10/24 remains requiring supplemental in 2-40 liter per nc   - 10/25 trial NC and  Daily     PRN Meds: sodium chloride flush, sodium chloride, ondansetron **OR** ondansetron, polyethylene glycol, acetaminophen **OR** acetaminophen      Physical Exam Performed:      General appearance:  No apparent distress, il appearing   Respiratory:  Normal respiratory effort at rest, on 2 liter per NC, course rhonchi - improved post brocnh   Cardiovascular:  Regular rate and rhythm.  Abdomen:  Soft, non-tender, non-distended.  Musculoskelatal:  No edema  Neurologic:  Non-focal  Psychiatric:  Alert and oriented    Telemetry:     Sinus tach rate 104    /61   Pulse 88   Temp 97.2 °F (36.2 °C) (Oral)   Resp 18   Ht 1.803 m (5' 11\")   Wt 68.9 kg (151 lb 12.8 oz)   SpO2 94%   BMI 21.17 kg/m²     Diet: ADULT DIET; Regular  ADULT ORAL NUTRITION SUPPLEMENT; Breakfast, Lunch, Dinner; Standard High Calorie/High Protein Oral Supplement    DVT Prophylaxis: [x]PPx LMWH  []SQ Heparin  []IPC/SCDs  []Eliquis  []Xarelto  []Coumadin  []Other -      Code status: Full Code    PT/OT Eval Status:   [x]NOT yet ordered  []Ordered and Pending   []Seen with Recommendations for:   []Home independently  []Home w/ assist  []HHC  []SNF  []Acute Rehab    Multi-Disciplinary Rounds with Case Management completed on 10/25/2024 with the following recommendations:  Anticipated Discharge Location: [x]Home  []HHC  []SNF  []Acute Rehab  []LTAC  []Hospice  []Other -    Anticipated Discharge Day/Date:  2-3 days   Barriers to Discharge:   --------------------------------------------------    [facility]  (this section is meant as an aid to accurate billing and as a supplement to ongoing patient care which is may be documented elsewhere in this note and the medical record)    [] High (any 2)    A. Problems (any 1)  [x] Acute/Chronic Illness/injury posing ongoing threat to life and/or bodily function w/out ongoing treatment due to:    [] Severe exacerbation of chronic illness:    --------------------------------------------------  B. Risk of

## 2024-10-25 NOTE — PROGRESS NOTES
[facility] Pulmonary and Critical Care   Consult Note      Reason for Consult: MONTY infiltrate, bronchiectasis, emphysema   Requesting Physician: Dr Lawrence    Subjective:   CHIEF COMPLAINT / HPI:                The patient is a 79 y.o. male with significant past medical history of:      Diagnosis Date    Acid fast bacillus 09/21/2016    sputum - QuantiFERON-TB Gold is negative - DNA probe negative for TB    Aneurysm (HCC) 2010    brain    Cancer (HCC) 1999    lung    Cavitary lesion of lung     Hyperlipidemia     Hypertension     Pneumonia     Pulmonary nodules      The patient underwent bronch and BAL yesterday. He feels a lot better today. He states that his cough is much better.    Past Surgical History:        Procedure Laterality Date    BRONCHOSCOPY  09/21/2016    BRONCHOSCOPY N/A 6/28/2022    MONARCH ROBOT (7:00) NO LABS performed by Greyson Kerr MD at [facility] ENDOSCOPY    BRONCHOSCOPY  6/28/2022    BRONCHOSCOPY performed by Greyson Kerr MD at [facility] ENDOSCOPY    BRONCHOSCOPY  6/28/2022    BRONCHOSCOPY/TRANSBRONCHIAL NEEDLE BIOPSY ROBOTIC performed by Greyson Kerr MD at [facility] ENDOSCOPY    BRONCHOSCOPY  6/28/2022    BRONCHOSCOPY BIOPSY BRONCHUS ROBOTIC performed by Greyson Kerr MD at [facility] ENDOSCOPY    BRONCHOSCOPY  6/28/2022    BRONCHOSCOPY ALVEOLAR LAVAGE ROBOTIC performed by Greyson Kerr MD at [facility] ENDOSCOPY    BRONCHOSCOPY N/A 10/24/2024    BRONCHOSCOPY ALVEOLAR LAVAGE performed by Sandoval Salomon MD at [facility] ENDOSCOPY    BRONCHOSCOPY  10/24/2024    BRONCHOSCOPY DIAGNOSTIC OR CELL WASH ONLY performed by Sandoval Salomon MD at [facility] ENDOSCOPY    COLONOSCOPY  2012    FINGER SURGERY  1964    right ring finger    HERNIA REPAIR  1946 1947    LUNG CANCER SURGERY  1999    lobe removed right    NOSE SURGERY  1964    deviated septum    OTHER SURGICAL HISTORY  2010    cerebral aneurysm wire     Current Medications:    Current Facility-Administered Medications: ceFEPIme (MAXIPIME) 2,000 mg in

## 2024-10-26 VITALS
RESPIRATION RATE: 18 BRPM | HEIGHT: 71 IN | OXYGEN SATURATION: 94 % | DIASTOLIC BLOOD PRESSURE: 70 MMHG | WEIGHT: 151.8 LBS | HEART RATE: 91 BPM | TEMPERATURE: 97.6 F | BODY MASS INDEX: 21.25 KG/M2 | SYSTOLIC BLOOD PRESSURE: 157 MMHG

## 2024-10-26 LAB
BACTERIA BLD CULT ORG #2: NORMAL
BACTERIA BLD CULT: NORMAL
BACTERIA SPEC RESP CULT: ABNORMAL
GRAM STN SPEC: ABNORMAL
H CAPSUL AG UR IA-ACNC: NOT DETECTED NG/ML
H CAPSUL AG UR QL IA: NOT DETECTED
ORGANISM: ABNORMAL

## 2024-10-26 PROCEDURE — 99232 SBSQ HOSP IP/OBS MODERATE 35: CPT | Performed by: INTERNAL MEDICINE

## 2024-10-26 PROCEDURE — 2580000003 HC RX 258: Performed by: INTERNAL MEDICINE

## 2024-10-26 PROCEDURE — 94640 AIRWAY INHALATION TREATMENT: CPT

## 2024-10-26 PROCEDURE — 6370000000 HC RX 637 (ALT 250 FOR IP): Performed by: INTERNAL MEDICINE

## 2024-10-26 PROCEDURE — 2700000000 HC OXYGEN THERAPY PER DAY

## 2024-10-26 PROCEDURE — 6360000002 HC RX W HCPCS: Performed by: INTERNAL MEDICINE

## 2024-10-26 PROCEDURE — 94761 N-INVAS EAR/PLS OXIMETRY MLT: CPT

## 2024-10-26 RX ORDER — LEVOFLOXACIN 750 MG/1
750 TABLET, FILM COATED ORAL DAILY
Qty: 7 TABLET | Refills: 0 | Status: SHIPPED | OUTPATIENT
Start: 2024-10-26 | End: 2024-11-02

## 2024-10-26 RX ADMIN — SODIUM CHLORIDE, PRESERVATIVE FREE 10 ML: 5 INJECTION INTRAVENOUS at 08:52

## 2024-10-26 RX ADMIN — LOSARTAN POTASSIUM 100 MG: 100 TABLET, FILM COATED ORAL at 08:52

## 2024-10-26 RX ADMIN — Medication 2 PUFF: at 07:34

## 2024-10-26 RX ADMIN — HYDROCHLOROTHIAZIDE 25 MG: 25 TABLET ORAL at 08:52

## 2024-10-26 RX ADMIN — Medication 2 PUFF: at 11:27

## 2024-10-26 RX ADMIN — ASPIRIN 325 MG: 325 TABLET ORAL at 08:52

## 2024-10-26 RX ADMIN — CEFEPIME 2000 MG: 2 INJECTION, POWDER, FOR SOLUTION INTRAVENOUS at 00:13

## 2024-10-26 RX ADMIN — ROSUVASTATIN CALCIUM 10 MG: 10 TABLET, FILM COATED ORAL at 08:52

## 2024-10-26 RX ADMIN — Medication 2 PUFF: at 15:02

## 2024-10-26 RX ADMIN — CEFEPIME 2000 MG: 2 INJECTION, POWDER, FOR SOLUTION INTRAVENOUS at 13:01

## 2024-10-26 RX ADMIN — TIOTROPIUM BROMIDE INHALATION SPRAY 1 PUFF: 3.12 SPRAY, METERED RESPIRATORY (INHALATION) at 07:34

## 2024-10-26 ASSESSMENT — PAIN SCALES - GENERAL
PAINLEVEL_OUTOF10: 0
PAINLEVEL_OUTOF10: 0

## 2024-10-26 NOTE — PLAN OF CARE
Problem: Safety - Adult  Goal: Free from fall injury  Outcome: Progressing   Encouraged to call for assistance before getting out of bed., call light within reach, non skid slipper socks on.   Problem: Pain  Goal: Verbalizes/displays adequate comfort level or baseline comfort level  Outcome: Progressing     Problem: Nutrition Deficit:  Goal: Optimize nutritional status  Outcome: Progressing

## 2024-10-26 NOTE — PROGRESS NOTES
Patient given discharge instructions and prescription. Voiced understanding via teach back. Patient discharged in stable condition with all belongings. To car via wheelchair. Home with spouse.

## 2024-10-26 NOTE — DISCHARGE INSTRUCTIONS
Your information:  Name: Tyler Reveles  : 1945    Your instructions:      What to do after you leave the hospital:    Recommended diet: regular diet    Recommended activity: activity as tolerated        The following personal items were collected during your admission and were returned to you:    Belongings  Dental Appliances: None  Vision - Corrective Lenses: Eyeglasses (In hospital room)  Hearing Aid: None  Clothing:  (Hospital gown)  Jewelry: Ring (Removed)  Body Piercings Removed: No  Electronic Devices: Tablet, Cell Phone  Weapons (Notify Protective Services/Security): None  Home Medications: None  Valuables Given To: Patient  Provide Name(s) of Who Valuable(s) Were Given To: Tyler  Patient approves for provider to speak to responsible person post operatively: Yes    Information obtained by:  By signing below, I understand that if any problems occur once I leave the hospital I am to contact my primary care doctor.  I understand and acknowledge receipt of the instructions indicated above.

## 2024-10-26 NOTE — TELEPHONE ENCOUNTER
Patient called in stating he is having a colonoscopy and wanted to make sure it is okay to interrupt antibiotics with colon prep    Thank you Patient transferred to  in stable condition with all belongings. Report called to Tang.

## 2024-10-26 NOTE — PROGRESS NOTES
Paged MD : FYI: patient is saying he is seeing ghosts and blood everywhere, like he is in\"\"2 worlds\". night RN said he was alert and oriented but he definately is not. Vital signs stable. oxygen is 94 on 2 liters. Yesterdays WBC 18.3. MD returned call with no new orders at this time.

## 2024-10-26 NOTE — PROGRESS NOTES
Hospital Medicine Progress Note  V 10.25      Date of Admission: 10/22/2024  Hospital Day: 5    Chief Admission Complaint:  \"SOB\"     Subjective:   visual hallucinations    Presenting Admission History:         \"79 y.o. male w/ remote hx Lung CA and Non-TB AFB infection who presented to [facility] with a 4-5 day hx of progressive SOB/GARIBAY that started on/about Thurs 17 October w/ associated generalized malaise/fatigue. Sxs so severe he had to cancel singing practice as well as Golf outing.      The patient denies any fever/chills or other signs/sxs of systemic illness or identifiable aggravating/alleviating factors\"    Assessment/Plan:      Current Principal Problem:  Lingular pneumonia      PNA - likely Gram Positive Community Acquired Pneumonia, possibly due to Strep Pneumonia.  MONTY cavitary findings on admission CT scan.  Patient w/ hx non-TB AFB infection 2016.  Infectious Disease consulted from ED and appreciated.   Started on empiric Rocephin/Azithro in ED on 22 October - changed to Maxipime per ID recs.  Pulmonology consulted and appreciated s/p Bronchoscopy 24 October.       Sepsis - w/ Leukocytosis/Tachycardia/Elevated Lactate POArrival 2nd to above infection.  Continue IVF as appropriate and monitor clinical response w/ ABX as ordered.       ARF - w/ elevated BUN/Cr ratio likely secondary to pre-renal azotemia.  Followed serial BUN/Creatinine on IVF x 1L hydration personally reviewed and documented in this note.       HTN - w/out known CAD and no evidence of active signs and/or symptoms of ischemia and/or failure. Currently controlled on home meds w/ vitals documented and reviewed.       HyperLipidemia - normally controlled on home Statin. Continued.  Follow up / PCP outpatient for medication initiation and/or adjustment as needed.       Hx Lung CAncer - remote 1999 w/out any known active issues.        Ongoing threat to life and/or bodily function without ongoing treatment due to:      Consults:   Regular  ADULT ORAL NUTRITION SUPPLEMENT; Lunch; Standard High Calorie/High Protein Oral Supplement    DVT Prophylaxis: []PPx LMWH  []SQ Heparin  []IPC/SCDs  []Eliquis  []Xarelto  []Coumadin  [] Heparin Drip  []Other -      Code status: Full Code    PT/OT Eval Status:   [x]NOT yet ordered  []Ordered and Pending   []Seen with Recommendations for:   []Home independently  []Home w/ assist  []HHC  []SNF  []Acute Rehab    Multi-Disciplinary Rounds with Case Management completed on 10/26/2024 with the following recommendations:  Anticipated Discharge Location: [x]Home w/ []HHC vs []SNF  []Acute Rehab  []LTAC  []Hospice  []Other -    Anticipated Discharge Day/Date:  Monday/Tues 28/29 October  Barriers to Discharge: clinical improvement  --------------------------------------------------    MDM (any 2 required for High level billing)    A. Problems (any 1)  [] Acute/Chronic Illness/injury posing ongoing threat to life and/or bodily function without ongoing treatment due to:    [] Severe exacerbation of chronic illness:    --------------------------------------------------  B. Risk of Treatment (any 1)   [] Drugs/treatments that require intensive monitoring for toxicity    [] IV ABX (Vancomycin, Aminoglycosides, etc)     [] Post-Cath/Contrast study requiring serial monitoring  [] IV Narcotic analgesia   [] Aggressive IV diuresis  [] Hypertonic Saline   [] Critical electrolyte abnormalities requiring IV replacement  [] Insulin - Scheduled/SSI or Insulin gtt  [] Anticoagulation (Heparin gtt or Coumadin - other anticoagulants in special circumstances)   [] Cardiac Medications (IV Amiodarone, Tikosyn, etc)  [] Hemodialysis  [] Other -  [] Change in code status:    [] Decision to escalate care:    [] Major surgery/procedure with associated risk factors:    --------------------------------------------------  C. Data (any 2)  [x] Data Review (any 3)  [x] Consultant notes from yesterday/today  [x] All available current labs reviewed

## 2024-10-26 NOTE — PROGRESS NOTES
[facility] Pulmonary and Critical Care   Consult Note      Reason for Consult: MONTY infiltrate, bronchiectasis, emphysema   Requesting Physician: Dr Lawrence    Subjective:   CHIEF COMPLAINT / HPI:                The patient is a 79 y.o. male with significant past medical history of:      Diagnosis Date    Acid fast bacillus 09/21/2016    sputum - QuantiFERON-TB Gold is negative - DNA probe negative for TB    Aneurysm (HCC) 2010    brain    Cancer (HCC) 1999    lung    Cavitary lesion of lung     Hyperlipidemia     Hypertension     Pneumonia     Pulmonary nodules      The patient continues to do well. Cough is much better since the bronchoscopy.    Past Surgical History:        Procedure Laterality Date    BRONCHOSCOPY  09/21/2016    BRONCHOSCOPY N/A 6/28/2022    MONARCH ROBOT (7:00) NO LABS performed by Greyson Kerr MD at [facility] ENDOSCOPY    BRONCHOSCOPY  6/28/2022    BRONCHOSCOPY performed by Greyson Kerr MD at [facility] ENDOSCOPY    BRONCHOSCOPY  6/28/2022    BRONCHOSCOPY/TRANSBRONCHIAL NEEDLE BIOPSY ROBOTIC performed by Greyson Kerr MD at [facility] ENDOSCOPY    BRONCHOSCOPY  6/28/2022    BRONCHOSCOPY BIOPSY BRONCHUS ROBOTIC performed by Greyson Kerr MD at [facility] ENDOSCOPY    BRONCHOSCOPY  6/28/2022    BRONCHOSCOPY ALVEOLAR LAVAGE ROBOTIC performed by Greyson Kerr MD at [facility] ENDOSCOPY    BRONCHOSCOPY N/A 10/24/2024    BRONCHOSCOPY ALVEOLAR LAVAGE performed by Sandoval Salomon MD at [facility] ENDOSCOPY    BRONCHOSCOPY  10/24/2024    BRONCHOSCOPY DIAGNOSTIC OR CELL WASH ONLY performed by Sandoval Salomon MD at [facility] ENDOSCOPY    COLONOSCOPY  2012    FINGER SURGERY  1964    right ring finger    HERNIA REPAIR  1946 1947    LUNG CANCER SURGERY  1999    lobe removed right    NOSE SURGERY  1964    deviated septum    OTHER SURGICAL HISTORY  2010    cerebral aneurysm wire     Current Medications:    Current Facility-Administered Medications: ceFEPIme (MAXIPIME) 2,000 mg in sodium chloride 0.9 % 100 mL IVPB  (mini-bag), 2,000 mg, IntraVENous, Q12H  calcium carbonate (TUMS) chewable tablet 500 mg, 500 mg, Oral, TID PRN  albuterol sulfate HFA (PROVENTIL;VENTOLIN;PROAIR) 108 (90 Base) MCG/ACT inhaler 2 puff, 2 puff, Inhalation, 4x Daily RT  aspirin tablet 325 mg, 325 mg, Oral, Daily  hydroCHLOROthiazide (HYDRODIURIL) tablet 25 mg, 25 mg, Oral, Daily  losartan (COZAAR) tablet 100 mg, 100 mg, Oral, Daily  rosuvastatin (CRESTOR) tablet 10 mg, 10 mg, Oral, Daily  tiotropium (SPIRIVA RESPIMAT) 2.5 MCG/ACT inhaler 1 puff, 1 puff, Inhalation, Daily RT  sodium chloride flush 0.9 % injection 5-40 mL, 5-40 mL, IntraVENous, 2 times per day  sodium chloride flush 0.9 % injection 5-40 mL, 5-40 mL, IntraVENous, PRN  0.9 % sodium chloride infusion, , IntraVENous, PRN  [Held by provider] enoxaparin (LOVENOX) injection 40 mg, 40 mg, SubCUTAneous, Daily  ondansetron (ZOFRAN-ODT) disintegrating tablet 4 mg, 4 mg, Oral, Q8H PRN **OR** ondansetron (ZOFRAN) injection 4 mg, 4 mg, IntraVENous, Q6H PRN  polyethylene glycol (GLYCOLAX) packet 17 g, 17 g, Oral, Daily PRN  acetaminophen (TYLENOL) tablet 650 mg, 650 mg, Oral, Q6H PRN **OR** acetaminophen (TYLENOL) suppository 650 mg, 650 mg, Rectal, Q6H PRN    Allergies   Allergen Reactions    Ace Inhibitors Other (See Comments)       Social History:    TOBACCO:   reports that he has been smoking cigarettes. He started smoking about 63 years ago. He has a 12.5 pack-year smoking history. He has never used smokeless tobacco.  ETOH:   reports current alcohol use.  Patient currently lives independently    Family History:       Problem Relation Age of Onset    Hypertension Mother     Hypertension Father     Asthma Neg Hx     Cancer Neg Hx     Diabetes Neg Hx     Heart Failure Neg Hx     Emphysema Neg Hx      REVIEW OF SYSTEMS:    No Chest pain, Nausea or vomiting reported      Objective:   PHYSICAL EXAM:      VITALS:  BP (!) 145/77   Pulse 89   Temp 97.6 °F (36.4 °C) (Oral)   Resp 16   Ht 1.803 m (5'

## 2024-10-27 LAB
ASPERGILLUS AB SER QL ID: NOT DETECTED
B DERMAT AB SER QL ID: NOT DETECTED
COCCIDIOIDES AB SPEC QL ID: NOT DETECTED
H CAPSUL AB TITR SER ID: NOT DETECTED {TITER}
PRELIMINARY: NORMAL

## 2024-10-27 NOTE — DISCHARGE SUMMARY
10/22/2024  EXAMINATION: CTA OF THE CHEST 10/22/2024 4:23 pm TECHNIQUE: CTA of the chest was performed after the administration of intravenous contrast.  Multiplanar reformatted images are provided for review.  MIP images are provided for review. Automated exposure control, iterative reconstruction, and/or weight based adjustment of the mA/kV was utilized to reduce the radiation dose to as low as reasonably achievable. COMPARISON: 05/01/2024 HISTORY: ORDERING SYSTEM PROVIDED HISTORY: left lower chest/abdominal pain TECHNOLOGIST PROVIDED HISTORY: Reason for exam:->left lower chest/abdominal pain Additional Contrast?->1 Reason for Exam: left lower chest pain since thursday with sob-LUQ pain-constipation x 2 days-low grade fever-h/o lung infections Additional signs and symptoms: hx-Lung CA -right lung surg Relevant Medical/Surgical History: hernia repair as a baby FINDINGS: Pulmonary Arteries: Pulmonary arteries are adequately opacified for evaluation.  No evidence of intraluminal filling defect to suggest pulmonary embolism.  Main pulmonary artery is normal in caliber. Mediastinum: No evidence of mediastinal lymphadenopathy.  The heart and pericardium demonstrate no acute abnormality.  At the level of the aortic valve, the anterior to posterior dimension is approximately 3.9 cm.  The remainder the aorta is unremarkable in appearance.  Negative for dissection. The aortic valve does not appear to be calcified.. Lungs/pleura: Apical scarring.  Extensive irregularity in the left upper lobe which is new from the previous study.  Multiple cystic cavities and consolidation in the left upper lobe.  The patient has extensive emphysema. Cavitation also at the right lung apex which is unchanged.  Scarring at the right lung apex.  Ground-glass opacities in the left upper lobe as well as the consolidation.  Trachea is patent bronchi are patent. Upper Abdomen: Spleen is enlarged.  Anterior to posterior dimension is 14.1 cm. Soft

## 2024-10-28 ENCOUNTER — CARE COORDINATION (OUTPATIENT)
Dept: CASE MANAGEMENT | Age: 79
End: 2024-10-28

## 2024-10-28 ENCOUNTER — TELEPHONE (OUTPATIENT)
Dept: INFECTIOUS DISEASES | Age: 79
End: 2024-10-28

## 2024-10-28 NOTE — CARE COORDINATION
Care Transitions Note    Initial Call - Call within 2 business days of discharge: Yes    Patient Current Location:  [place]    Care Transition Nurse contacted the patient by telephone to perform post hospital discharge assessment, verified name and  as identifiers. Provided introduction to self, and explanation of the Care Transition Nurse role.     Patient: Tyler Reveles    Patient : 1945   MRN: [id number]    Reason for Admission: SOB PNA   Discharge Date: 10/26/24  RURS: Readmission Risk Score: 11.8      Last Discharge Facility       Date Complaint Diagnosis Description Type Department Provider    10/22/24 Shortness of Breath Lingular pneumonia ... ED to Hosp-Admission (Discharged) (ADMITTED) [facility] Greyson Lawrence MD; [facility]...            Was this an external facility discharge? No    Additional needs identified to be addressed with provider   No needs identified             Method of communication with provider: none.    Patients top risk factors for readmission: functional physical ability and medical condition-.    Interventions to address risk factors:   Education: s/sx to monitor and when to report   Review of patient management of conditions/medications: .    Care Summary Note: CTN spoke with patient who reported his breathing is stable he just remains weak. Patient encouraged to increase fluids and if breathing worsens to notify provider right away. CTN reviewed all medications with patient who reported he is taking all as prescribed. Patient will be setting up apts with ONC Dr. Ward and ID provider as well. CTN advised patient of use of urgent care or physician’s 24 hr access line if assistance is needed after hours.    Care Transition Nurse reviewed discharge instructions, medical action plan, and red flags with patient. The patient was given an opportunity to ask questions; all questions answered at this time.. The patient verbalized understanding.   Were discharge instructions available

## 2024-10-28 NOTE — TELEPHONE ENCOUNTER
You saw patient in hospital and they are wanting to establish care with you instead of with Dr. Wiley due to location issues, they are closer to you instead of him.    They are wanting to schedule a follow up from the hospital, is this okay?

## 2024-10-29 ENCOUNTER — OFFICE VISIT (OUTPATIENT)
Dept: PULMONOLOGY | Age: 79
End: 2024-10-29
Payer: MEDICARE

## 2024-10-29 VITALS
SYSTOLIC BLOOD PRESSURE: 102 MMHG | DIASTOLIC BLOOD PRESSURE: 58 MMHG | BODY MASS INDEX: 20.98 KG/M2 | OXYGEN SATURATION: 97 % | HEART RATE: 89 BPM | WEIGHT: 149.9 LBS | RESPIRATION RATE: 12 BRPM | HEIGHT: 71 IN

## 2024-10-29 DIAGNOSIS — F51.5 NIGHTMARES: ICD-10-CM

## 2024-10-29 DIAGNOSIS — R93.89 ABNORMAL CT OF THE CHEST: ICD-10-CM

## 2024-10-29 DIAGNOSIS — A31.8 MYCOBACTERIUM ABSCESSUS IDENTIFIED ON DIAGNOSTIC TESTING: ICD-10-CM

## 2024-10-29 DIAGNOSIS — J98.4 CAVITARY LUNG DISEASE: Primary | ICD-10-CM

## 2024-10-29 DIAGNOSIS — J43.9 PULMONARY EMPHYSEMA, UNSPECIFIED EMPHYSEMA TYPE (HCC): ICD-10-CM

## 2024-10-29 DIAGNOSIS — J47.9 BRONCHIECTASIS WITHOUT COMPLICATION (HCC): ICD-10-CM

## 2024-10-29 PROCEDURE — 1159F MED LIST DOCD IN RCRD: CPT | Performed by: INTERNAL MEDICINE

## 2024-10-29 PROCEDURE — 99214 OFFICE O/P EST MOD 30 MIN: CPT | Performed by: INTERNAL MEDICINE

## 2024-10-29 PROCEDURE — 1123F ACP DISCUSS/DSCN MKR DOCD: CPT | Performed by: INTERNAL MEDICINE

## 2024-10-29 NOTE — PROGRESS NOTES
Physician Progress Note      PATIENT:               TACOS RODRIGUEZ  CSN #:                  [id number]  :                       1945  ADMIT DATE:       10/22/2024 3:19 PM  DISCH DATE:        10/26/2024 5:34 PM  RESPONDING  PROVIDER #:        Greyson Lawrence MD          QUERY TEXT:    Patient admitted with sepsis. Noted to have severe malnutrition per RD consult   note 10/25. If possible, please document in progress notes and discharge   summary if you are evaluating and /or treating any of the following:    The medical record reflects the following:  Risk Factors: sepsis, pseudomonas pneumonia, lung cancer, 79 yr old    Clinical Indicators: Per RD consult note 10/25-\" Severe malnutrition (10/25/24   1236)  Context:  Acute Illness  Findings of the 6 clinical characteristics of malnutrition:  Energy Intake:  50% or less of estimated energy requirements for 5 or more   days  Weight Loss:  No significant weight loss  Body Fat Loss:  Moderate body fat loss Orbital, Triceps, Buccal region  Muscle Mass Loss:  Moderate muscle mass loss Temples (temporalis), Clavicles   (pectoralis & deltoids), Hand (interosseous)  Fluid Accumulation:  No significant fluid accumulation   Strength:  Not Performed\"  BMI 21    Treatment: RD consult, ONS, weight, I/O    ASPEN Criteria:    https://aspenjournals.onlinelibrary.lainez.com/doi/full/10.1177/993748060648854  5  Options provided:  -- Protein calorie malnutrition severe  -- Other - I will add my own diagnosis  -- Disagree - Not applicable / Not valid  -- Disagree - Clinically unable to determine / Unknown  -- Refer to Clinical Documentation Reviewer    PROVIDER RESPONSE TEXT:    This patient has severe protein calorie malnutrition.    Query created by: Verna Brewer on 10/29/2024 2:05 PM      Electronically signed by:  Greyson Lawrence MD 10/29/2024 2:17 PM

## 2024-10-29 NOTE — PROGRESS NOTES
[facility]                                                            Outpatient Follow Up Note      CHIEF COMPLAINT: Follow up hospitalization      HPI:   Patient was admitted recently to [facility] treated for left upper lobe pneumonia.  Underwent bronchoscopy grew Pseudomonas. Discharged on Levaquin.   Patient is having vivid dreams since he was discharged, very vivid distressing him, denies acting out his dreams   Sleeps 8:30-9 pm and gets up 8 am   Sleep onset few min.   On Levaquin   Little sputum  Overall much better   Rifampin, Biaxin and Ethambutol - of since June 2024   No smoking since April 2021   Uses Albuterol 1-2 puff /day        Past Medical History:   Diagnosis Date    Acid fast bacillus 09/21/2016    sputum - QuantiFERON-TB Gold is negative - DNA probe negative for TB    Aneurysm (HCC) 2010    brain    Cancer (HCC) 1999    lung    Cavitary lesion of lung     Hyperlipidemia     Hypertension     Pneumonia     Pulmonary nodules        Past Surgical History:        Procedure Laterality Date    BRONCHOSCOPY  09/21/2016    BRONCHOSCOPY N/A 6/28/2022    MONARCH ROBOT (7:00) NO LABS performed by Greyson Kerr MD at [facility] ENDOSCOPY    BRONCHOSCOPY  6/28/2022    BRONCHOSCOPY performed by Greyson Kerr MD at [facility] ENDOSCOPY    BRONCHOSCOPY  6/28/2022    BRONCHOSCOPY/TRANSBRONCHIAL NEEDLE BIOPSY ROBOTIC performed by Greyson Kerr MD at [facility] ENDOSCOPY    BRONCHOSCOPY  6/28/2022    BRONCHOSCOPY BIOPSY BRONCHUS ROBOTIC performed by Greyson Kerr MD at [facility] ENDOSCOPY    BRONCHOSCOPY  6/28/2022    BRONCHOSCOPY ALVEOLAR LAVAGE ROBOTIC performed by Greyson Kerr MD at [facility] ENDOSCOPY    BRONCHOSCOPY N/A 10/24/2024    BRONCHOSCOPY ALVEOLAR LAVAGE performed by Sandoval Salomon MD at [facility] ENDOSCOPY    BRONCHOSCOPY  10/24/2024    BRONCHOSCOPY DIAGNOSTIC OR CELL WASH ONLY performed by Sandoval Salomon MD at

## 2024-10-31 LAB
FUNGUS SPEC CULT: ABNORMAL
LOEFFLER MB STN SPEC: ABNORMAL
ORGANISM: ABNORMAL

## 2024-11-02 LAB
FINAL REPORT: NORMAL
PRELIMINARY: NORMAL

## 2024-11-04 ENCOUNTER — TELEPHONE (OUTPATIENT)
Dept: PULMONOLOGY | Age: 79
End: 2024-11-04

## 2024-11-04 LAB
ACID FAST STN SPEC QL: ABNORMAL
FUNGUS SPEC CULT: NORMAL
LOEFFLER MB STN SPEC: NORMAL
MYCOBACTERIUM SPEC CULT: ABNORMAL
ORGANISM: ABNORMAL

## 2024-11-05 ENCOUNTER — OFFICE VISIT (OUTPATIENT)
Dept: INFECTIOUS DISEASES | Age: 79
End: 2024-11-05
Payer: MEDICARE

## 2024-11-05 VITALS
SYSTOLIC BLOOD PRESSURE: 122 MMHG | WEIGHT: 149 LBS | DIASTOLIC BLOOD PRESSURE: 76 MMHG | HEART RATE: 66 BPM | TEMPERATURE: 97.8 F | HEIGHT: 72 IN | BODY MASS INDEX: 20.18 KG/M2 | OXYGEN SATURATION: 98 %

## 2024-11-05 DIAGNOSIS — A31.8 MYCOBACTERIUM ABSCESSUS IDENTIFIED ON DIAGNOSTIC TESTING: Primary | ICD-10-CM

## 2024-11-05 DIAGNOSIS — J98.4 CAVITARY LUNG DISEASE: Primary | ICD-10-CM

## 2024-11-05 LAB
ACID FAST STN SPEC QL: NORMAL
ACID FAST STN SPEC QL: NORMAL
MYCOBACTERIUM SPEC CULT: NORMAL
MYCOBACTERIUM SPEC CULT: NORMAL

## 2024-11-05 PROCEDURE — 1159F MED LIST DOCD IN RCRD: CPT | Performed by: INTERNAL MEDICINE

## 2024-11-05 PROCEDURE — 1123F ACP DISCUSS/DSCN MKR DOCD: CPT | Performed by: INTERNAL MEDICINE

## 2024-11-05 PROCEDURE — 99214 OFFICE O/P EST MOD 30 MIN: CPT | Performed by: INTERNAL MEDICINE

## 2024-11-05 NOTE — PROGRESS NOTES
unchanged.  4. Splenomegaly.   Impression   1. Mild right perinephric inflammatory stranding. This is nonspecific and may  be related to a recently passed stone or pyelonephritis.  2. Extensive emphysematous changes in both lung bases right greater than left.  3. Patchy airspace disease in the inferior lingula.      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

## 2024-11-06 ENCOUNTER — HOSPITAL ENCOUNTER (OUTPATIENT)
Age: 79
Setting detail: SPECIMEN
Discharge: HOME | End: 2024-11-06
Payer: MEDICARE

## 2024-11-06 PROCEDURE — 87798 DETECT AGENT NOS DNA AMP: CPT

## 2024-11-06 PROCEDURE — 87206 SMEAR FLUORESCENT/ACID STAI: CPT

## 2024-11-06 PROCEDURE — 87116 MYCOBACTERIA CULTURE: CPT

## 2024-11-06 PROCEDURE — 87556 M.TUBERCULO DNA AMP PROBE: CPT

## 2024-11-07 ENCOUNTER — HOSPITAL ENCOUNTER (OUTPATIENT)
Age: 79
Setting detail: SPECIMEN
Discharge: HOME | End: 2024-11-07
Payer: MEDICARE

## 2024-11-07 LAB
ACID FAST STN SPEC QL: NORMAL
MTB COMPLEX PCR: NORMAL

## 2024-11-07 PROCEDURE — 87116 MYCOBACTERIA CULTURE: CPT

## 2024-11-07 PROCEDURE — 87206 SMEAR FLUORESCENT/ACID STAI: CPT

## 2024-11-07 PROCEDURE — 87556 M.TUBERCULO DNA AMP PROBE: CPT

## 2024-11-07 PROCEDURE — 87798 DETECT AGENT NOS DNA AMP: CPT

## 2024-11-08 ENCOUNTER — HOSPITAL ENCOUNTER (OUTPATIENT)
Age: 79
Setting detail: SPECIMEN
Discharge: HOME | End: 2024-11-08
Payer: MEDICARE

## 2024-11-08 LAB
ACID FAST STN SPEC QL: ABNORMAL
MTB COMPLEX PCR: NORMAL
ORGANISM: ABNORMAL

## 2024-11-08 PROCEDURE — 87556 M.TUBERCULO DNA AMP PROBE: CPT

## 2024-11-08 PROCEDURE — 87206 SMEAR FLUORESCENT/ACID STAI: CPT

## 2024-11-08 PROCEDURE — 87116 MYCOBACTERIA CULTURE: CPT

## 2024-11-08 PROCEDURE — 87798 DETECT AGENT NOS DNA AMP: CPT

## 2024-11-10 LAB
ACID FAST STN SPEC QL: ABNORMAL
MTB COMPLEX PCR: NORMAL
ORGANISM: ABNORMAL

## 2024-11-11 ENCOUNTER — HOSPITAL ENCOUNTER (OUTPATIENT)
Dept: ULTRASOUND IMAGING | Age: 79
Discharge: HOME | End: 2024-11-11
Attending: INTERNAL MEDICINE
Payer: MEDICARE

## 2024-11-11 DIAGNOSIS — R16.1 SPLENOMEGALY: ICD-10-CM

## 2024-11-11 LAB
FUNGUS SPEC CULT: NORMAL
LOEFFLER MB STN SPEC: NORMAL

## 2024-11-11 PROCEDURE — 76981 USE PARENCHYMA: CPT

## 2024-11-11 PROCEDURE — 76705 ECHO EXAM OF ABDOMEN: CPT

## 2024-11-12 LAB
ACID FAST STN SPEC QL: ABNORMAL
ACID FAST STN SPEC QL: NORMAL
ACID FAST STN SPEC QL: NORMAL
MYCOBACTERIUM SPEC CULT: ABNORMAL
MYCOBACTERIUM SPEC CULT: NORMAL
MYCOBACTERIUM SPEC CULT: NORMAL
ORGANISM: ABNORMAL
ORGANISM: ABNORMAL

## 2024-11-14 LAB
ACID FAST STN SPEC QL: ABNORMAL
MYCOBACTERIUM SPEC CULT: ABNORMAL
ORGANISM: ABNORMAL
ORGANISM: ABNORMAL

## 2024-11-21 ENCOUNTER — HOSPITAL ENCOUNTER (OUTPATIENT)
Age: 79
Discharge: HOME | End: 2024-11-21
Payer: MEDICARE

## 2024-11-21 ENCOUNTER — HOSPITAL ENCOUNTER (OUTPATIENT)
Dept: GENERAL RADIOLOGY | Age: 79
Discharge: HOME | End: 2024-11-21
Payer: MEDICARE

## 2024-11-21 DIAGNOSIS — R93.89 ABNORMAL CT OF THE CHEST: ICD-10-CM

## 2024-11-21 PROCEDURE — 71046 X-RAY EXAM CHEST 2 VIEWS: CPT

## 2024-11-25 LAB
FUNGUS SPEC CULT: ABNORMAL
FUNGUS SPEC CULT: NORMAL
LOEFFLER MB STN SPEC: ABNORMAL
LOEFFLER MB STN SPEC: NORMAL
ORGANISM: ABNORMAL

## 2024-11-26 ENCOUNTER — TELEPHONE (OUTPATIENT)
Dept: INFECTIOUS DISEASES | Age: 79
End: 2024-11-26

## 2024-11-26 LAB
ACID FAST STN SPEC QL: ABNORMAL
ACID FAST STN SPEC QL: NORMAL
ACID FAST STN SPEC QL: NORMAL
Lab: NORMAL
MYCOBACTERIUM SPEC CULT: ABNORMAL
MYCOBACTERIUM SPEC CULT: NORMAL
MYCOBACTERIUM SPEC CULT: NORMAL
ORGANISM: ABNORMAL
REPORT: NORMAL
THIS TEST SENT TO: NORMAL

## 2024-11-26 NOTE — TELEPHONE ENCOUNTER
Sputum culture 10/24/24 SOPHIE S macrolide and FQ    Sputum cultures 11/6, 11/7, 11/8 also all w SOPHIE     When I saw him on 11/5, he was feeling well, no worsening SOB, exercise tolerance, weight loss, fever  At that time we elected to withhold therapy waiting in final cultures and a repeat CT chest in 1/2024     I think as long as he remains clinically stable, this is still reasonable     AURELIANO RUIZ MD

## 2024-11-26 NOTE — TELEPHONE ENCOUNTER
----- Message from YAA MARAVILLA MA sent at 11/26/2024  1:44 PM EST -----  Forward to pily hannon

## 2024-11-27 ENCOUNTER — OFFICE VISIT (OUTPATIENT)
Dept: PULMONOLOGY | Age: 79
End: 2024-11-27
Payer: MEDICARE

## 2024-11-27 VITALS
TEMPERATURE: 96.9 F | OXYGEN SATURATION: 95 % | DIASTOLIC BLOOD PRESSURE: 70 MMHG | BODY MASS INDEX: 20.15 KG/M2 | WEIGHT: 148.6 LBS | SYSTOLIC BLOOD PRESSURE: 128 MMHG | HEART RATE: 104 BPM

## 2024-11-27 DIAGNOSIS — F51.5 NIGHTMARES: ICD-10-CM

## 2024-11-27 DIAGNOSIS — R93.89 ABNORMAL CT OF THE CHEST: Primary | ICD-10-CM

## 2024-11-27 DIAGNOSIS — J47.9 BRONCHIECTASIS WITHOUT COMPLICATION (HCC): ICD-10-CM

## 2024-11-27 PROCEDURE — 99214 OFFICE O/P EST MOD 30 MIN: CPT | Performed by: INTERNAL MEDICINE

## 2024-11-27 PROCEDURE — 1123F ACP DISCUSS/DSCN MKR DOCD: CPT | Performed by: INTERNAL MEDICINE

## 2024-11-27 PROCEDURE — 1159F MED LIST DOCD IN RCRD: CPT | Performed by: INTERNAL MEDICINE

## 2024-11-27 RX ORDER — SODIUM CHLORIDE FOR INHALATION 3 %
4 VIAL, NEBULIZER (ML) INHALATION 2 TIMES DAILY
Qty: 240 ML | Refills: 3 | Status: SHIPPED | OUTPATIENT
Start: 2024-11-27 | End: 2025-03-27

## 2024-11-27 ASSESSMENT — SLEEP AND FATIGUE QUESTIONNAIRES
HOW LIKELY ARE YOU TO NOD OFF OR FALL ASLEEP WHILE SITTING INACTIVE IN A PUBLIC PLACE: WOULD NEVER DOZE
ESS TOTAL SCORE: 5
HOW LIKELY ARE YOU TO NOD OFF OR FALL ASLEEP WHILE SITTING QUIETLY AFTER LUNCH WITHOUT ALCOHOL: WOULD NEVER DOZE
HOW LIKELY ARE YOU TO NOD OFF OR FALL ASLEEP IN A CAR, WHILE STOPPED FOR A FEW MINUTES IN TRAFFIC: WOULD NEVER DOZE
HOW LIKELY ARE YOU TO NOD OFF OR FALL ASLEEP WHILE SITTING AND READING: SLIGHT CHANCE OF DOZING
HOW LIKELY ARE YOU TO NOD OFF OR FALL ASLEEP WHEN YOU ARE A PASSENGER IN A CAR FOR AN HOUR WITHOUT A BREAK: SLIGHT CHANCE OF DOZING
HOW LIKELY ARE YOU TO NOD OFF OR FALL ASLEEP WHILE SITTING AND TALKING TO SOMEONE: WOULD NEVER DOZE
HOW LIKELY ARE YOU TO NOD OFF OR FALL ASLEEP WHILE LYING DOWN TO REST IN THE AFTERNOON WHEN CIRCUMSTANCES PERMIT: MODERATE CHANCE OF DOZING
HOW LIKELY ARE YOU TO NOD OFF OR FALL ASLEEP WHILE WATCHING TV: SLIGHT CHANCE OF DOZING

## 2024-11-27 NOTE — PROGRESS NOTES
radiographic response. 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  AFB negative since 2/14/18.  Off Mycobacterium medications since 3/14/2019.  Bronch 6/20/22 negative Bx and positive NTB. Rifampin Biaxin and Ethambutol with improvement Augut 2022-June 2024.  Repeat cultures November 2024 positive for SOPHIE.  Currently being followed by Dr. Ardon  RLL 4 mm nodule stable on repeat CT chest 4/23/2020.  Stable on repeat CT 6/20/2022.  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  Advised to continue with smoking cessation.   Follow up with ID as planned February 2024  CT chest as planned   Follow up after CT chest

## 2024-11-29 DIAGNOSIS — J47.0 BRONCHIECTASIS WITH ACUTE LOWER RESPIRATORY INFECTION (HCC): ICD-10-CM

## 2024-11-29 DIAGNOSIS — J47.9 BRONCHIECTASIS WITHOUT COMPLICATION (HCC): Primary | ICD-10-CM

## 2024-11-29 NOTE — TELEPHONE ENCOUNTER
Script for neb soln does not have a DX code. [facility] needs this to fill medication. Pharmacist at [facility] states script needs updated with DX and refaxed. Script with DX pending to print.     Patient is leaving Monday morning for [place]. He would like script faxed to [facility] in [facility].       F. [phone]

## 2024-12-02 RX ORDER — SODIUM CHLORIDE FOR INHALATION 3 %
4 VIAL, NEBULIZER (ML) INHALATION 2 TIMES DAILY
Qty: 240 ML | Refills: 3 | Status: SHIPPED | OUTPATIENT
Start: 2024-12-02 | End: 2025-04-01

## 2024-12-03 RX ORDER — NEBULIZER ACCESSORIES
1 KIT MISCELLANEOUS DAILY PRN
Qty: 1 KIT | Refills: 0 | Status: SHIPPED | OUTPATIENT
Start: 2024-12-03

## 2024-12-03 NOTE — TELEPHONE ENCOUNTER
Patient does not have nebulizer to administer his NACL soln. Order pended to [facility] in [place] at the request of the pharmacy.

## 2025-02-25 ENCOUNTER — HOSPITAL ENCOUNTER (OUTPATIENT)
Dept: CT IMAGING | Age: 80
Discharge: HOME | End: 2025-02-25
Attending: INTERNAL MEDICINE
Payer: MEDICARE

## 2025-02-25 DIAGNOSIS — J98.4 CAVITARY LUNG DISEASE: ICD-10-CM

## 2025-02-25 PROCEDURE — 71250 CT THORAX DX C-: CPT

## 2025-03-06 ENCOUNTER — OFFICE VISIT (OUTPATIENT)
Dept: PULMONOLOGY | Age: 80
End: 2025-03-06
Payer: MEDICARE

## 2025-03-06 VITALS
DIASTOLIC BLOOD PRESSURE: 74 MMHG | WEIGHT: 167.6 LBS | OXYGEN SATURATION: 98 % | BODY MASS INDEX: 22.7 KG/M2 | RESPIRATION RATE: 16 BRPM | HEART RATE: 80 BPM | SYSTOLIC BLOOD PRESSURE: 156 MMHG | HEIGHT: 72 IN

## 2025-03-06 DIAGNOSIS — R93.89 ABNORMAL CT OF THE CHEST: ICD-10-CM

## 2025-03-06 DIAGNOSIS — J47.9 BRONCHIECTASIS WITHOUT COMPLICATION (HCC): Primary | ICD-10-CM

## 2025-03-06 DIAGNOSIS — J43.9 PULMONARY EMPHYSEMA, UNSPECIFIED EMPHYSEMA TYPE (HCC): ICD-10-CM

## 2025-03-06 PROCEDURE — 99214 OFFICE O/P EST MOD 30 MIN: CPT | Performed by: INTERNAL MEDICINE

## 2025-03-06 PROCEDURE — 1123F ACP DISCUSS/DSCN MKR DOCD: CPT | Performed by: INTERNAL MEDICINE

## 2025-03-06 PROCEDURE — 1159F MED LIST DOCD IN RCRD: CPT | Performed by: INTERNAL MEDICINE

## 2025-03-06 RX ORDER — ROSUVASTATIN CALCIUM 20 MG/1
TABLET, COATED ORAL
COMMUNITY
Start: 2024-10-17

## 2025-03-06 RX ORDER — VALSARTAN AND HYDROCHLOROTHIAZIDE 160; 12.5 MG/1; MG/1
1 TABLET, FILM COATED ORAL DAILY
COMMUNITY

## 2025-03-06 RX ORDER — FAMOTIDINE 10 MG
10 TABLET ORAL 2 TIMES DAILY
COMMUNITY
Start: 2024-11-04

## 2025-03-06 RX ORDER — VALSARTAN 160 MG/1
160 TABLET ORAL DAILY
COMMUNITY
Start: 2025-01-22

## 2025-03-06 NOTE — PROGRESS NOTES
[facility]                                                            Outpatient Follow Up Note      CHIEF COMPLAINT: Follow up CT chest       HPI:   CT chest reviewed by me and noted below. Results were dicussed with patient and multiple good questions were answered.   Feels better overall   Little dry cough   3 nights sweats last week   No longer with nightmares   Rifampin, Biaxin and Ethambutol - off since June 2024   No smoking since April 2021         From prior visit:   Patient was admitted recently to [facility] treated for left upper lobe pneumonia.  Underwent bronchoscopy grew Pseudomonas. Discharged on Levaquin.   Patient is having vivid dreams since he was discharged, very vivid distressing him, denies acting out his dreams   Sleeps 8:30-9 pm and gets up 8 am   Sleep onset few min.   On Levaquin   Little sputum  Overall much better   Rifampin, Biaxin and Ethambutol - of since June 2024   No smoking since April 2021   Uses Albuterol 1-2 puff /day        Past Medical History:   Diagnosis Date    Acid fast bacillus 09/21/2016    sputum - QuantiFERON-TB Gold is negative - DNA probe negative for TB    Aneurysm 2010    brain    Cancer (HCC) 1999    lung    Cavitary lesion of lung     Hyperlipidemia     Hypertension     Pneumonia     Pulmonary nodules        Past Surgical History:        Procedure Laterality Date    BRONCHOSCOPY  09/21/2016    BRONCHOSCOPY N/A 6/28/2022    MONARCH ROBOT (7:00) NO LABS performed by Greyson Kerr MD at [facility] ENDOSCOPY    BRONCHOSCOPY  6/28/2022    BRONCHOSCOPY performed by Greyson Kerr MD at [facility] ENDOSCOPY    BRONCHOSCOPY  6/28/2022    BRONCHOSCOPY/TRANSBRONCHIAL NEEDLE BIOPSY ROBOTIC performed by Greyson Kerr MD at [facility] ENDOSCOPY    BRONCHOSCOPY  6/28/2022    BRONCHOSCOPY BIOPSY BRONCHUS ROBOTIC performed by Greyson Kerr MD at [facility] ENDOSCOPY    BRONCHOSCOPY  6/28/2022

## 2025-03-11 ENCOUNTER — OFFICE VISIT (OUTPATIENT)
Dept: INFECTIOUS DISEASES | Age: 80
End: 2025-03-11
Payer: MEDICARE

## 2025-03-11 VITALS
HEART RATE: 75 BPM | TEMPERATURE: 98.2 F | SYSTOLIC BLOOD PRESSURE: 166 MMHG | OXYGEN SATURATION: 96 % | WEIGHT: 179 LBS | HEIGHT: 72 IN | BODY MASS INDEX: 24.24 KG/M2 | DIASTOLIC BLOOD PRESSURE: 75 MMHG

## 2025-03-11 DIAGNOSIS — A31.0 MAI (MYCOBACTERIUM AVIUM-INTRACELLULARE) INFECTION (HCC): Primary | ICD-10-CM

## 2025-03-11 PROCEDURE — 99214 OFFICE O/P EST MOD 30 MIN: CPT | Performed by: INTERNAL MEDICINE

## 2025-03-11 PROCEDURE — 1123F ACP DISCUSS/DSCN MKR DOCD: CPT | Performed by: INTERNAL MEDICINE

## 2025-03-11 NOTE — PROGRESS NOTES
Department of Internal Medicine  Infectious Diseases      Patient Name: Tyler Reveles      Date of visit: 3/11/2025  SUBJECTIVE:  Tyler Reveles  is a 79 y.o. male who returns today for hospital follow-up, management of pulmonary NTM infection    Pertinent history -   Admitted 10/22-10/26/24 with suspected CAP   See my consult dated 10/23/24 for details   He had diagnostic bronch with growth of FQ-S Psa   He improved rapidly after bronch w evacuation of mucous plugs  Cultures were positive for FQ-S Psa  He completed 7d course of culture directed abx  Clinically doing well  No fever, chills  No change in resp symptoms     BAL AFB cultures x3 positive for SOPHIE    Stains were positive on 2/3     He was last seen in this office 11/2024, clinically doing well   We elected to defer any treatment    He had repeat CT chest 2/25/25  1. Severe emphysematous changes are seen as before. Persistent but improved left upper lobe consolidation with volume loss compared with the prior examination. Findings could represent residual or recurrent infiltrate or mass. There is also biapical   scarring as before. Follow-up for resolution or stability is advised, consider CT within 3 months or correlate clinically for signs of infection and consider PET/CT.        He saw Dr. Holman last week   Repeat CT chest in 3 months ordered   He says he is feeling ok   Exercise tolerance is better   Not requiring rescue inhaler   Periods most days of maybe 10 minutes of coughing spells, does not expectorate anything   Cough is dry  No hemoptysis   Weight is stable     Overall feels well  No new concerns     Since last visit was diagnosed with CLL, no treatment, only surveillance for now       REVIEW OF SYSTEMS:    CONSTITUTIONAL:  negative for fevers, chills  Weight stable    EYES:  negative for blurred vision, eye discharge, visual disturbance and icterus  HEENT:  negative for hearing loss, tinnitus, ear drainage, sinus pressure, nasal congestion,

## 2025-04-07 DIAGNOSIS — J43.9 PULMONARY EMPHYSEMA, UNSPECIFIED EMPHYSEMA TYPE (HCC): Primary | ICD-10-CM

## 2025-04-07 RX ORDER — TIOTROPIUM BROMIDE INHALATION SPRAY 1.56 UG/1
SPRAY, METERED RESPIRATORY (INHALATION)
Qty: 1 EACH | Refills: 3 | Status: SHIPPED | OUTPATIENT
Start: 2025-04-07

## 2025-04-28 ENCOUNTER — TELEPHONE (OUTPATIENT)
Dept: PULMONOLOGY | Age: 80
End: 2025-04-28

## 2025-04-28 DIAGNOSIS — J43.9 PULMONARY EMPHYSEMA, UNSPECIFIED EMPHYSEMA TYPE (HCC): ICD-10-CM

## 2025-04-28 DIAGNOSIS — J47.9 BRONCHIECTASIS WITHOUT COMPLICATION (HCC): Primary | ICD-10-CM

## 2025-04-28 NOTE — TELEPHONE ENCOUNTER
Patient received a letter regarding his upcoming CT being denied. I sent an email to the CT PA team to see what the next steps were for the patient and his CT denial.

## 2025-04-28 NOTE — TELEPHONE ENCOUNTER
All has been corrected with CT order and denial. Patient will be able to move forward with his testing.

## 2025-05-01 ENCOUNTER — HOSPITAL ENCOUNTER (OUTPATIENT)
Dept: CT IMAGING | Age: 80
Discharge: HOME | End: 2025-05-01
Attending: INTERNAL MEDICINE
Payer: MEDICARE

## 2025-05-01 DIAGNOSIS — J47.9 BRONCHIECTASIS WITHOUT COMPLICATION (HCC): ICD-10-CM

## 2025-05-01 DIAGNOSIS — J43.9 PULMONARY EMPHYSEMA, UNSPECIFIED EMPHYSEMA TYPE (HCC): ICD-10-CM

## 2025-05-01 PROCEDURE — 71250 CT THORAX DX C-: CPT

## 2025-05-13 ENCOUNTER — OFFICE VISIT (OUTPATIENT)
Dept: PULMONOLOGY | Age: 80
End: 2025-05-13
Payer: MEDICARE

## 2025-05-13 VITALS
DIASTOLIC BLOOD PRESSURE: 74 MMHG | OXYGEN SATURATION: 96 % | WEIGHT: 162.6 LBS | RESPIRATION RATE: 16 BRPM | HEART RATE: 83 BPM | HEIGHT: 72 IN | BODY MASS INDEX: 22.02 KG/M2 | SYSTOLIC BLOOD PRESSURE: 136 MMHG

## 2025-05-13 DIAGNOSIS — R93.89 ABNORMAL CT OF THE CHEST: Primary | ICD-10-CM

## 2025-05-13 DIAGNOSIS — J43.9 PULMONARY EMPHYSEMA, UNSPECIFIED EMPHYSEMA TYPE (HCC): ICD-10-CM

## 2025-05-13 DIAGNOSIS — J47.9 BRONCHIECTASIS WITHOUT COMPLICATION (HCC): ICD-10-CM

## 2025-05-13 PROCEDURE — 1123F ACP DISCUSS/DSCN MKR DOCD: CPT | Performed by: INTERNAL MEDICINE

## 2025-05-13 PROCEDURE — 99214 OFFICE O/P EST MOD 30 MIN: CPT | Performed by: INTERNAL MEDICINE

## 2025-05-13 PROCEDURE — 1159F MED LIST DOCD IN RCRD: CPT | Performed by: INTERNAL MEDICINE

## 2025-05-13 RX ORDER — VALSARTAN 320 MG/1
320 TABLET ORAL DAILY
COMMUNITY
Start: 2025-03-07

## 2025-05-13 NOTE — PROGRESS NOTES
[facility]                                                            Outpatient Follow Up Note      CHIEF COMPLAINT: Follow up CT chest       HPI:   CT chest reviewed by me and noted below. Results were dicussed with patient and multiple good questions were answered.   Some congestion with clear sputum  No fever or night sweats   Rifampin, Biaxin and Ethambutol - off since June 2024   Compliant with inhaled bronchodilators.   Since starte Spiriva has been doing much better   No smoking since April 2021         From prior visit:   Patient was admitted recently to [facility] treated for left upper lobe pneumonia.  Underwent bronchoscopy grew Pseudomonas. Discharged on Levaquin.   Patient is having vivid dreams since he was discharged, very vivid distressing him, denies acting out his dreams   Sleeps 8:30-9 pm and gets up 8 am   Sleep onset few min.   On Levaquin   Little sputum  Overall much better   Rifampin, Biaxin and Ethambutol - of since June 2024   No smoking since April 2021   Uses Albuterol 1-2 puff /day        Past Medical History:   Diagnosis Date    Acid fast bacillus 09/21/2016    sputum - QuantiFERON-TB Gold is negative - DNA probe negative for TB    Aneurysm 2010    brain    Cancer (HCC) 1999    lung    Cavitary lesion of lung     Hyperlipidemia     Hypertension     Pneumonia     Pulmonary nodules        Past Surgical History:        Procedure Laterality Date    BRONCHOSCOPY  09/21/2016    BRONCHOSCOPY N/A 6/28/2022    MONARCH ROBOT (7:00) NO LABS performed by Greyson Kerr MD at [facility] ENDOSCOPY    BRONCHOSCOPY  6/28/2022    BRONCHOSCOPY performed by Greyson Kerr MD at [facility] ENDOSCOPY    BRONCHOSCOPY  6/28/2022    BRONCHOSCOPY/TRANSBRONCHIAL NEEDLE BIOPSY ROBOTIC performed by Greyson Kerr MD at [facility] ENDOSCOPY    BRONCHOSCOPY  6/28/2022    BRONCHOSCOPY BIOPSY BRONCHUS ROBOTIC performed by Greyson Kerr MD

## 2025-08-11 DIAGNOSIS — J43.9 PULMONARY EMPHYSEMA, UNSPECIFIED EMPHYSEMA TYPE (HCC): ICD-10-CM

## 2025-08-11 RX ORDER — TIOTROPIUM BROMIDE INHALATION SPRAY 1.56 UG/1
SPRAY, METERED RESPIRATORY (INHALATION)
Qty: 1 EACH | Refills: 2 | Status: SHIPPED | OUTPATIENT
Start: 2025-08-11

## (undated) DEVICE — SHEET,DRAPE,40X58,STERILE: Brand: MEDLINE

## (undated) DEVICE — AIRLIFE™ ADULT AEROSOL MASK VINYL, UNDER-THE-CHIN STYLE: Brand: AIRLIFE™

## (undated) DEVICE — FORCEPS BIOPSY SMOOTH CUP

## (undated) DEVICE — SYRINGE MED 50ML LUERSLIP TIP

## (undated) DEVICE — SYRINGE MED 10ML SLIP TIP BLNT FILL AND LUERLOCK DISP

## (undated) DEVICE — SINGLE USE SUCTION VALVE MAJ-209: Brand: SINGLE USE SUCTION VALVE (STERILE)

## (undated) DEVICE — NEEDLE BRONCHSCP 21GA HNDL SHTH NDL STYL PERIVIEW FLX

## (undated) DEVICE — NEBULIZER AEROSOL TBNG 7 FT FOR ACUTE CARE NEBUTECH

## (undated) DEVICE — SINGLE USE BIOPSY VALVE MAJ-210: Brand: SINGLE USE BIOPSY VALVE (STERILE)

## (undated) DEVICE — BRONCHOSCOPES MONARCH

## (undated) DEVICE — GLOVE ORTHO 8   MSG9480